# Patient Record
Sex: FEMALE | Race: WHITE | Employment: FULL TIME | URBAN - METROPOLITAN AREA
[De-identification: names, ages, dates, MRNs, and addresses within clinical notes are randomized per-mention and may not be internally consistent; named-entity substitution may affect disease eponyms.]

---

## 2020-09-10 ENCOUNTER — HOSPITAL ENCOUNTER (EMERGENCY)
Age: 50
Discharge: HOME | End: 2020-09-10
Attending: EMERGENCY MEDICINE

## 2020-09-10 VITALS
BODY MASS INDEX: 22.5 KG/M2 | WEIGHT: 140 LBS | RESPIRATION RATE: 16 BRPM | TEMPERATURE: 98.6 F | HEIGHT: 66 IN | DIASTOLIC BLOOD PRESSURE: 82 MMHG | SYSTOLIC BLOOD PRESSURE: 120 MMHG | OXYGEN SATURATION: 98 % | HEART RATE: 86 BPM

## 2020-09-10 DIAGNOSIS — F43.10 PTSD (POST-TRAUMATIC STRESS DISORDER): ICD-10-CM

## 2020-09-10 DIAGNOSIS — F33.1 MODERATE EPISODE OF RECURRENT MAJOR DEPRESSIVE DISORDER (HCC): Primary | ICD-10-CM

## 2020-09-10 LAB
ANION GAP SERPL CALC-SCNC: 5 MMOL/L (ref 7–16)
APAP SERPL-MCNC: <10 UG/ML (ref 10–30)
BASOPHILS # BLD: 0.1 K/UL (ref 0–0.2)
BASOPHILS NFR BLD: 1 % (ref 0–2)
BUN SERPL-MCNC: 13 MG/DL (ref 6–23)
CALCIUM SERPL-MCNC: 9.5 MG/DL (ref 8.3–10.4)
CHLORIDE SERPL-SCNC: 108 MMOL/L (ref 98–107)
CO2 SERPL-SCNC: 27 MMOL/L (ref 21–32)
CREAT SERPL-MCNC: 0.73 MG/DL (ref 0.6–1)
DIFFERENTIAL METHOD BLD: NORMAL
EOSINOPHIL # BLD: 0.5 K/UL (ref 0–0.8)
EOSINOPHIL NFR BLD: 6 % (ref 0.5–7.8)
ERYTHROCYTE [DISTWIDTH] IN BLOOD BY AUTOMATED COUNT: 13.7 % (ref 11.9–14.6)
ETHANOL SERPL-MCNC: <3 MG/DL
GLUCOSE SERPL-MCNC: 83 MG/DL (ref 65–100)
HCT VFR BLD AUTO: 41 % (ref 35.8–46.3)
HGB BLD-MCNC: 13.3 G/DL (ref 11.7–15.4)
IMM GRANULOCYTES # BLD AUTO: 0 K/UL (ref 0–0.5)
IMM GRANULOCYTES NFR BLD AUTO: 0 % (ref 0–5)
LYMPHOCYTES # BLD: 2.5 K/UL (ref 0.5–4.6)
LYMPHOCYTES NFR BLD: 33 % (ref 13–44)
MCH RBC QN AUTO: 29.2 PG (ref 26.1–32.9)
MCHC RBC AUTO-ENTMCNC: 32.4 G/DL (ref 31.4–35)
MCV RBC AUTO: 89.9 FL (ref 79.6–97.8)
MONOCYTES # BLD: 0.6 K/UL (ref 0.1–1.3)
MONOCYTES NFR BLD: 7 % (ref 4–12)
NEUTS SEG # BLD: 4 K/UL (ref 1.7–8.2)
NEUTS SEG NFR BLD: 52 % (ref 43–78)
NRBC # BLD: 0 K/UL (ref 0–0.2)
PLATELET # BLD AUTO: 385 K/UL (ref 150–450)
PMV BLD AUTO: 11 FL (ref 9.4–12.3)
POTASSIUM SERPL-SCNC: 3.6 MMOL/L (ref 3.5–5.1)
RBC # BLD AUTO: 4.56 M/UL (ref 4.05–5.2)
SALICYLATES SERPL-MCNC: 2.9 MG/DL (ref 2.8–20)
SODIUM SERPL-SCNC: 140 MMOL/L (ref 136–145)
WBC # BLD AUTO: 7.6 K/UL (ref 4.3–11.1)

## 2020-09-10 PROCEDURE — 80307 DRUG TEST PRSMV CHEM ANLYZR: CPT

## 2020-09-10 PROCEDURE — 85025 COMPLETE CBC W/AUTO DIFF WBC: CPT

## 2020-09-10 PROCEDURE — 80048 BASIC METABOLIC PNL TOTAL CA: CPT

## 2020-09-10 PROCEDURE — 99284 EMERGENCY DEPT VISIT MOD MDM: CPT

## 2020-09-10 RX ORDER — BUPROPION HYDROCHLORIDE 150 MG/1
150 TABLET ORAL
Qty: 30 TAB | Refills: 0 | Status: SHIPPED | OUTPATIENT
Start: 2020-09-10 | End: 2022-04-19

## 2020-09-10 RX ORDER — RISPERIDONE 2 MG/1
2 TABLET, FILM COATED ORAL
Qty: 30 TAB | Refills: 0 | Status: SHIPPED | OUTPATIENT
Start: 2020-09-10 | End: 2022-04-19
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
## 2020-09-11 NOTE — ED NOTES
I have reviewed discharge instructions with the patient. The patient verbalized understanding. Patient left ED via Discharge Method: ambulatory to Home with self. Opportunity for questions and clarification provided. Patient given 2 scripts. To continue your aftercare when you leave the hospital, you may receive an automated call from our care team to check in on how you are doing. This is a free service and part of our promise to provide the best care and service to meet your aftercare needs.  If you have questions, or wish to unsubscribe from this service please call [phone]. Thank you for Choosing our [facility].

## 2020-09-11 NOTE — DISCHARGE INSTRUCTIONS
Take medications as prescribed  Follow-up with mental health as soon as possible  [facility]  Return to ER for any worsening symptoms or new problems which may arise

## 2022-03-05 ENCOUNTER — HOSPITAL ENCOUNTER (EMERGENCY)
Age: 52
Discharge: HOME | End: 2022-03-05
Attending: EMERGENCY MEDICINE

## 2022-03-05 ENCOUNTER — APPOINTMENT (OUTPATIENT)
Dept: GENERAL RADIOLOGY | Age: 52
End: 2022-03-05
Attending: PHYSICIAN ASSISTANT

## 2022-03-05 VITALS
SYSTOLIC BLOOD PRESSURE: 126 MMHG | HEART RATE: 57 BPM | HEIGHT: 66 IN | WEIGHT: 145 LBS | OXYGEN SATURATION: 100 % | RESPIRATION RATE: 17 BRPM | TEMPERATURE: 98.5 F | BODY MASS INDEX: 23.3 KG/M2 | DIASTOLIC BLOOD PRESSURE: 79 MMHG

## 2022-03-05 DIAGNOSIS — S81.851A DOG BITE OF RIGHT LOWER LEG, INITIAL ENCOUNTER: Primary | ICD-10-CM
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
## 2022-03-05 LAB
BASOPHILS # BLD: 0 K/UL (ref 0–0.2)
BASOPHILS NFR BLD: 0 % (ref 0–2)
DIFFERENTIAL METHOD BLD: NORMAL
EOSINOPHIL # BLD: 0.1 K/UL (ref 0–0.8)
EOSINOPHIL NFR BLD: 1 % (ref 0.5–7.8)
ERYTHROCYTE [DISTWIDTH] IN BLOOD BY AUTOMATED COUNT: 13.9 % (ref 11.9–14.6)
HCT VFR BLD AUTO: 39.4 % (ref 35.8–46.3)
HGB BLD-MCNC: 12.6 G/DL (ref 11.7–15.4)
IMM GRANULOCYTES # BLD AUTO: 0 K/UL (ref 0–0.5)
IMM GRANULOCYTES NFR BLD AUTO: 0 % (ref 0–5)
LYMPHOCYTES # BLD: 3.1 K/UL (ref 0.5–4.6)
LYMPHOCYTES NFR BLD: 33 % (ref 13–44)
MCH RBC QN AUTO: 29.3 PG (ref 26.1–32.9)
MCHC RBC AUTO-ENTMCNC: 32 G/DL (ref 31.4–35)
MCV RBC AUTO: 91.6 FL (ref 79.6–97.8)
MONOCYTES # BLD: 0.6 K/UL (ref 0.1–1.3)
MONOCYTES NFR BLD: 7 % (ref 4–12)
NEUTS SEG # BLD: 5.7 K/UL (ref 1.7–8.2)
NEUTS SEG NFR BLD: 59 % (ref 43–78)
NRBC # BLD: 0 K/UL (ref 0–0.2)
PLATELET # BLD AUTO: 300 K/UL (ref 150–450)
PMV BLD AUTO: 11.2 FL (ref 9.4–12.3)
RBC # BLD AUTO: 4.3 M/UL (ref 4.05–5.2)
WBC # BLD AUTO: 9.6 K/UL (ref 4.3–11.1)

## 2022-03-05 PROCEDURE — 90715 TDAP VACCINE 7 YRS/> IM: CPT | Performed by: PHYSICIAN ASSISTANT

## 2022-03-05 PROCEDURE — 99284 EMERGENCY DEPT VISIT MOD MDM: CPT

## 2022-03-05 PROCEDURE — 96375 TX/PRO/DX INJ NEW DRUG ADDON: CPT

## 2022-03-05 PROCEDURE — 96365 THER/PROPH/DIAG IV INF INIT: CPT

## 2022-03-05 PROCEDURE — 74011250636 HC RX REV CODE- 250/636: Performed by: PHYSICIAN ASSISTANT

## 2022-03-05 PROCEDURE — 73590 X-RAY EXAM OF LOWER LEG: CPT

## 2022-03-05 PROCEDURE — 85025 COMPLETE CBC W/AUTO DIFF WBC: CPT

## 2022-03-05 PROCEDURE — 74011000258 HC RX REV CODE- 258: Performed by: PHYSICIAN ASSISTANT

## 2022-03-05 PROCEDURE — 90471 IMMUNIZATION ADMIN: CPT

## 2022-03-05 RX ORDER — TRAMADOL HYDROCHLORIDE 50 MG/1
50 TABLET ORAL
Qty: 12 TABLET | Refills: 0 | Status: SHIPPED | OUTPATIENT
Start: 2022-03-05 | End: 2022-03-08

## 2022-03-05 RX ORDER — AMOXICILLIN AND CLAVULANATE POTASSIUM 875; 125 MG/1; MG/1
1 TABLET, FILM COATED ORAL 2 TIMES DAILY
Qty: 20 TABLET | Refills: 0 | Status: SHIPPED | OUTPATIENT
Start: 2022-03-05 | End: 2022-03-15

## 2022-03-05 RX ORDER — IBUPROFEN 800 MG/1
800 TABLET ORAL
Qty: 20 TABLET | Refills: 0 | Status: SHIPPED | OUTPATIENT
Start: 2022-03-05 | End: 2022-03-12

## 2022-03-05 RX ORDER — MORPHINE SULFATE 4 MG/ML
2 INJECTION INTRAVENOUS
Status: COMPLETED | OUTPATIENT
Start: 2022-03-05 | End: 2022-03-05

## 2022-03-05 RX ADMIN — SODIUM CHLORIDE 3 G: 900 INJECTION INTRAVENOUS at 16:06

## 2022-03-05 RX ADMIN — MORPHINE SULFATE 2 MG: 4 INJECTION INTRAVENOUS at 16:15

## 2022-03-05 RX ADMIN — TETANUS TOXOID, REDUCED DIPHTHERIA TOXOID AND ACELLULAR PERTUSSIS VACCINE, ADSORBED 0.5 ML: 5; 2.5; 8; 8; 2.5 SUSPENSION INTRAMUSCULAR at 17:27

## 2022-03-05 NOTE — ED NOTES
I have reviewed discharge instructions with the patient. The patient verbalized understanding. Patient left ED via Discharge Method: ambulatory to Home with self    Opportunity for questions and clarification provided. Patient given 1 scripts. To continue your aftercare when you leave the hospital, you may receive an automated call from our care team to check in on how you are doing. This is a free service and part of our promise to provide the best care and service to meet your aftercare needs.  If you have questions, or wish to unsubscribe from this service please call [phone]. Thank you for Choosing our [facility].

## 2022-03-05 NOTE — DISCHARGE INSTRUCTIONS
Keep wound dry for 24-48 hours, then wash twice daily with soap and water. Watch for any signs of infection such as foul drainage from wound, spreading redness or fever as these would be reasons to return to the ER. Take full course of antibiotics (augmentin twice daily for 10 days), can take motrin every 6 hours for pain/swelling, tramadol every 6 hours as needed for pain. Keep right leg elevated as much as possible and use crutches to keep weight off of leg to alleviate stress on wounds. Follow up with Dr. Kendell Krishnan in the office this week for further evaluation of your wounds.
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
## 2022-03-05 NOTE — ED TRIAGE NOTES
Patient arrives via [facility] from home. Masked. Reports bit by neighbor's dog prior to arrival.  Reports tetanus is not up to date. Reports dog is vaccinated.

## 2022-03-28 ENCOUNTER — APPOINTMENT (OUTPATIENT)
Dept: GENERAL RADIOLOGY | Age: 52
DRG: 918 | End: 2022-03-28
Attending: EMERGENCY MEDICINE

## 2022-03-28 ENCOUNTER — HOSPITAL ENCOUNTER (INPATIENT)
Age: 52
LOS: 21 days | Discharge: PSYCHIATRIC OR SUBSTANCE USE FACILITY | DRG: 918 | End: 2022-04-19
Attending: EMERGENCY MEDICINE | Admitting: FAMILY MEDICINE

## 2022-03-28 DIAGNOSIS — F12.10 MARIJUANA ABUSE: ICD-10-CM

## 2022-03-28 DIAGNOSIS — F30.10 MANIC BEHAVIOR (HCC): ICD-10-CM

## 2022-03-28 DIAGNOSIS — Z65.8 PSYCHOSOCIAL STRESSORS: ICD-10-CM

## 2022-03-28 DIAGNOSIS — T14.91XA SUICIDE ATTEMPT (HCC): ICD-10-CM

## 2022-03-28 DIAGNOSIS — F31.9 BIPOLAR 1 DISORDER (HCC): ICD-10-CM

## 2022-03-28 DIAGNOSIS — T56.892A: Primary | ICD-10-CM

## 2022-03-28 DIAGNOSIS — F15.10 METHAMPHETAMINE ABUSE (HCC): ICD-10-CM

## 2022-03-28 DIAGNOSIS — R19.7 DIARRHEA, UNSPECIFIED TYPE: ICD-10-CM

## 2022-03-28 DIAGNOSIS — F22 PARANOIA (HCC): ICD-10-CM

## 2022-03-28 DIAGNOSIS — F22 DELUSIONS (HCC): ICD-10-CM

## 2022-03-28 DIAGNOSIS — F31.4 BIPOLAR 1 DISORDER, DEPRESSED, SEVERE (HCC): ICD-10-CM

## 2022-03-28 LAB
ALBUMIN SERPL-MCNC: 4 G/DL (ref 3.5–5)
ALBUMIN/GLOB SERPL: 1.4 {RATIO} (ref 1.2–3.5)
ALP SERPL-CCNC: 80 U/L (ref 50–136)
ALT SERPL-CCNC: 16 U/L (ref 12–65)
AMPHET UR QL SCN: POSITIVE
ANION GAP SERPL CALC-SCNC: 9 MMOL/L (ref 7–16)
APAP SERPL-MCNC: <2 UG/ML (ref 10–30)
AST SERPL-CCNC: 9 U/L (ref 15–37)
ATRIAL RATE: 108 BPM
BARBITURATES UR QL SCN: NEGATIVE
BASOPHILS # BLD: 0 K/UL (ref 0–0.2)
BASOPHILS NFR BLD: 0 % (ref 0–2)
BENZODIAZ UR QL: NEGATIVE
BILIRUB SERPL-MCNC: 0.6 MG/DL (ref 0.2–1.1)
BUN SERPL-MCNC: 13 MG/DL (ref 6–23)
CALCIUM SERPL-MCNC: 9.7 MG/DL (ref 8.3–10.4)
CALCULATED P AXIS, ECG09: 91 DEGREES
CALCULATED R AXIS, ECG10: 85 DEGREES
CALCULATED T AXIS, ECG11: 64 DEGREES
CANNABINOIDS UR QL SCN: POSITIVE
CHLORIDE SERPL-SCNC: 108 MMOL/L (ref 98–107)
CO2 SERPL-SCNC: 24 MMOL/L (ref 21–32)
COCAINE UR QL SCN: NEGATIVE
CREAT SERPL-MCNC: 0.7 MG/DL (ref 0.6–1)
DIAGNOSIS, 93000: NORMAL
DIFFERENTIAL METHOD BLD: NORMAL
EOSINOPHIL # BLD: 0.1 K/UL (ref 0–0.8)
EOSINOPHIL NFR BLD: 1 % (ref 0.5–7.8)
ERYTHROCYTE [DISTWIDTH] IN BLOOD BY AUTOMATED COUNT: 13.4 % (ref 11.9–14.6)
ETHANOL SERPL-MCNC: <3 MG/DL
GLOBULIN SER CALC-MCNC: 2.8 G/DL (ref 2.3–3.5)
GLUCOSE SERPL-MCNC: 116 MG/DL (ref 65–100)
HCT VFR BLD AUTO: 40.6 % (ref 35.8–46.3)
HGB BLD-MCNC: 13.4 G/DL (ref 11.7–15.4)
IMM GRANULOCYTES # BLD AUTO: 0 K/UL (ref 0–0.5)
IMM GRANULOCYTES NFR BLD AUTO: 0 % (ref 0–5)
LITHIUM SERPL-SCNC: 0.7 MMOL/L (ref 0.6–1.2)
LYMPHOCYTES # BLD: 2.1 K/UL (ref 0.5–4.6)
LYMPHOCYTES NFR BLD: 23 % (ref 13–44)
MAGNESIUM SERPL-MCNC: 1.9 MG/DL (ref 1.8–2.4)
MCH RBC QN AUTO: 29.5 PG (ref 26.1–32.9)
MCHC RBC AUTO-ENTMCNC: 33 G/DL (ref 31.4–35)
MCV RBC AUTO: 89.2 FL (ref 79.6–97.8)
METHADONE UR QL: NEGATIVE
MONOCYTES # BLD: 0.5 K/UL (ref 0.1–1.3)
MONOCYTES NFR BLD: 6 % (ref 4–12)
NEUTS SEG # BLD: 6.3 K/UL (ref 1.7–8.2)
NEUTS SEG NFR BLD: 70 % (ref 43–78)
NRBC # BLD: 0 K/UL (ref 0–0.2)
OPIATES UR QL: NEGATIVE
P-R INTERVAL, ECG05: 121 MS
PCP UR QL: NEGATIVE
PLATELET # BLD AUTO: 355 K/UL (ref 150–450)
PMV BLD AUTO: 11 FL (ref 9.4–12.3)
POTASSIUM SERPL-SCNC: 4.1 MMOL/L (ref 3.5–5.1)
PROT SERPL-MCNC: 6.8 G/DL (ref 6.3–8.2)
Q-T INTERVAL, ECG07: 359 MS
QRS DURATION, ECG06: 108 MS
QTC CALCULATION (BEZET), ECG08: 484 MS
RBC # BLD AUTO: 4.55 M/UL (ref 4.05–5.2)
SALICYLATES SERPL-MCNC: 4.6 MG/DL (ref 2.8–20)
SODIUM SERPL-SCNC: 141 MMOL/L (ref 136–145)
VENTRICULAR RATE, ECG03: 109 BPM
WBC # BLD AUTO: 9.1 K/UL (ref 4.3–11.1)

## 2022-03-28 PROCEDURE — 82077 ASSAY SPEC XCP UR&BREATH IA: CPT

## 2022-03-28 PROCEDURE — 81003 URINALYSIS AUTO W/O SCOPE: CPT

## 2022-03-28 PROCEDURE — 80143 DRUG ASSAY ACETAMINOPHEN: CPT

## 2022-03-28 PROCEDURE — 93005 ELECTROCARDIOGRAM TRACING: CPT | Performed by: EMERGENCY MEDICINE

## 2022-03-28 PROCEDURE — 85025 COMPLETE CBC W/AUTO DIFF WBC: CPT

## 2022-03-28 PROCEDURE — 80053 COMPREHEN METABOLIC PANEL: CPT

## 2022-03-28 PROCEDURE — 99285 EMERGENCY DEPT VISIT HI MDM: CPT

## 2022-03-28 PROCEDURE — 80179 DRUG ASSAY SALICYLATE: CPT

## 2022-03-28 PROCEDURE — 74011250636 HC RX REV CODE- 250/636: Performed by: EMERGENCY MEDICINE

## 2022-03-28 PROCEDURE — 80178 ASSAY OF LITHIUM: CPT

## 2022-03-28 PROCEDURE — 84443 ASSAY THYROID STIM HORMONE: CPT

## 2022-03-28 PROCEDURE — 80307 DRUG TEST PRSMV CHEM ANLYZR: CPT

## 2022-03-28 PROCEDURE — 74018 RADEX ABDOMEN 1 VIEW: CPT

## 2022-03-28 PROCEDURE — 83735 ASSAY OF MAGNESIUM: CPT

## 2022-03-28 RX ORDER — SODIUM CHLORIDE 9 MG/ML
1000 INJECTION, SOLUTION INTRAVENOUS ONCE
Status: COMPLETED | OUTPATIENT
Start: 2022-03-28 | End: 2022-03-28

## 2022-03-28 RX ADMIN — SODIUM CHLORIDE 1000 ML: 9 INJECTION, SOLUTION INTRAVENOUS at 19:15

## 2022-03-28 NOTE — ED PROVIDER NOTES
And was brought to the emergency room by EMS with a reported intentional overdose of lithium. The patient has a bottle of what is listed is lithium  mg tablets but the bottle is fairly full. She does not remember how many pills she took or cannot say when she took them. She denies any coingestants. Patient presents in a state of somnolence which EMS is been present since they picked her up. Patient is a poor historian, with a history of substance abuse and bipolar disorder. The history is provided by the patient and medical records. Drug Overdose  This is a new problem. Episode onset: Unknown duration. The problem has not changed since onset. Pertinent negatives include no chest pain, no abdominal pain, no headaches and no shortness of breath. Nothing aggravates the symptoms. Nothing relieves the symptoms. She has tried nothing for the symptoms. The treatment provided no relief. Mental Health Problem   This is a recurrent problem. The problem has not changed since onset. Associated symptoms include somnolence and self-injury. Mental status baseline is normal.  Risk factors include illicit drug use and the patient not taking meds correctly. Her past medical history is significant for psychotropic medication treatment.         Past Medical History:   Diagnosis Date    Ankle fracture, left     Psychiatric disorder     depression and axiety, no treatment at this time       Past Surgical History:   Procedure Laterality Date    HX GYN  1996    rectal reconstruction after child iris    HX OTHER SURGICAL      HX TUBAL LIGATION  1996         Family History:   Problem Relation Age of Onset    Cancer Mother     Cancer Father     Lung Disease Father        Social History     Socioeconomic History    Marital status:      Spouse name: Not on file    Number of children: Not on file    Years of education: Not on file    Highest education level: Not on file   Occupational History    Not on file Tobacco Use    Smoking status: Current Every Day Smoker     Packs/day: 2.00     Years: 26.00     Pack years: 52.00    Smokeless tobacco: Never Used   Substance and Sexual Activity    Alcohol use: No    Drug use: Yes     Types: Marijuana, Methamphetamines, Prescription    Sexual activity: Not on file     Comment: no meth in 8 yr and no pot in 5 yrs   Other Topics Concern    Not on file   Social History Narrative    Not on file     Social Determinants of Health     Financial Resource Strain:     Difficulty of Paying Living Expenses: Not on file   Food Insecurity:     Worried About Running Out of Food in the Last Year: Not on file    Kelvin of Food in the Last Year: Not on file   Transportation Needs:     Lack of Transportation (Medical): Not on file    Lack of Transportation (Non-Medical): Not on file   Physical Activity:     Days of Exercise per Week: Not on file    Minutes of Exercise per Session: Not on file   Stress:     Feeling of Stress : Not on file   Social Connections:     Frequency of Communication with Friends and Family: Not on file    Frequency of Social Gatherings with Friends and Family: Not on file    Attends Mandaeism Services: Not on file    Active Member of [address] [place] [address] [employer] or Organizations: Not on file    Attends Club or Organization Meetings: Not on file    Marital Status: Not on file   Intimate Partner Violence:     Fear of Current or Ex-Partner: Not on file    Emotionally Abused: Not on file    Physically Abused: Not on file    Sexually Abused: Not on file   Housing Stability:     Unable to Pay for Housing in the Last Year: Not on file    Number of [place] in the Last Year: Not on file    Unstable Housing in the Last Year: Not on file         ALLERGIES: Bactrim [sulfamethoprim ds]    Review of Systems   Constitutional: Negative for chills and fever. Respiratory: Negative for shortness of breath. Cardiovascular: Negative for chest pain.    Gastrointestinal: Negative for abdominal pain. Neurological: Negative for headaches. Psychiatric/Behavioral: Positive for self-injury. All other systems reviewed and are negative. Vitals:    03/28/22 1851 03/28/22 1855   BP: 94/78    Pulse: (!) 113    Resp: 16    Temp:  98.5 °F (36.9 °C)   SpO2: 99%    Weight: 65.8 kg (145 lb)    Height: 5' 6\" (1.676 m)             Physical Exam  Vitals and nursing note reviewed. Constitutional:       General: She is not in acute distress. Appearance: Normal appearance. She is not ill-appearing, toxic-appearing or diaphoretic. HENT:      Head: Normocephalic and atraumatic. Mouth/Throat:      Mouth: Mucous membranes are moist.      Pharynx: Oropharynx is clear. No oropharyngeal exudate or posterior oropharyngeal erythema. Eyes:      Extraocular Movements: Extraocular movements intact. Conjunctiva/sclera: Conjunctivae normal.      Pupils: Pupils are equal, round, and reactive to light. Cardiovascular:      Rate and Rhythm: Normal rate and regular rhythm. Pulses: Normal pulses. Heart sounds: Normal heart sounds. Pulmonary:      Effort: Pulmonary effort is normal.      Breath sounds: Normal breath sounds. Abdominal:      General: There is no distension. Palpations: Abdomen is soft. Tenderness: There is no abdominal tenderness. There is no right CVA tenderness, left CVA tenderness, guarding or rebound. Musculoskeletal:         General: Normal range of motion. Cervical back: Normal range of motion and neck supple. Skin:     General: Skin is warm and dry. Capillary Refill: Capillary refill takes less than 2 seconds. Neurological:      General: No focal deficit present. Mental Status: She is alert and oriented to person, place, and time. Mental status is at baseline. Psychiatric:         Mood and Affect: Mood normal.         Behavior: Behavior normal.         Thought Content:  Thought content normal.          MDM  Number of Diagnoses or Management Options     Amount and/or Complexity of Data Reviewed  Clinical lab tests: ordered and reviewed  Tests in the radiology section of CPT®: ordered and reviewed  Review and summarize past medical records: yes  Discuss the patient with other providers: yes  Independent visualization of images, tracings, or specimens: yes (EKG at 1857: Sinus tachycardia, rate of 109. Possible right atrial enlargement. No acute ischemic changes, no ectopy, normal intervals.   Normal QRS duration.)    Risk of Complications, Morbidity, and/or Mortality  Presenting problems: moderate  Diagnostic procedures: moderate  Management options: moderate    Patient Progress  Patient progress: stable         Procedures

## 2022-03-28 NOTE — ED TRIAGE NOTES
Pt arrives via EMS from home with CO taking an unknown amount of liithium carbonate as well as something else that she doesn't remember. Pt reports she took these medications around a half hour before calling EMS. VSS en route. Pt a/o x4. Admits to this being an attempt to commit suicide.

## 2022-03-29 PROBLEM — F15.11 HISTORY OF METHAMPHETAMINE ABUSE (HCC): Status: ACTIVE | Noted: 2018-01-26

## 2022-03-29 PROBLEM — T56.892A INTENTIONAL LITHIUM OVERDOSE (HCC): Status: ACTIVE | Noted: 2022-03-29

## 2022-03-29 PROBLEM — F31.4 BIPOLAR 1 DISORDER, DEPRESSED, SEVERE (HCC): Status: ACTIVE | Noted: 2020-11-27

## 2022-03-29 LAB
ANION GAP SERPL CALC-SCNC: 6 MMOL/L (ref 7–16)
BASOPHILS # BLD: 0 K/UL (ref 0–0.2)
BASOPHILS NFR BLD: 0 % (ref 0–2)
BILIRUB UR QL: ABNORMAL
BUN SERPL-MCNC: 9 MG/DL (ref 6–23)
CALCIUM SERPL-MCNC: 9 MG/DL (ref 8.3–10.4)
CHLORIDE SERPL-SCNC: 113 MMOL/L (ref 98–107)
CO2 SERPL-SCNC: 23 MMOL/L (ref 21–32)
CREAT SERPL-MCNC: 0.6 MG/DL (ref 0.6–1)
DIFFERENTIAL METHOD BLD: NORMAL
EOSINOPHIL # BLD: 0.1 K/UL (ref 0–0.8)
EOSINOPHIL NFR BLD: 1 % (ref 0.5–7.8)
ERYTHROCYTE [DISTWIDTH] IN BLOOD BY AUTOMATED COUNT: 13.2 % (ref 11.9–14.6)
GLUCOSE SERPL-MCNC: 89 MG/DL (ref 65–100)
GLUCOSE UR QL STRIP.AUTO: NEGATIVE MG/DL
HCT VFR BLD AUTO: 37.3 % (ref 35.8–46.3)
HGB BLD-MCNC: 12.2 G/DL (ref 11.7–15.4)
IMM GRANULOCYTES # BLD AUTO: 0 K/UL (ref 0–0.5)
IMM GRANULOCYTES NFR BLD AUTO: 0 % (ref 0–5)
KETONES UR-MCNC: 15 MG/DL
LEUKOCYTE ESTERASE UR QL STRIP: NEGATIVE
LITHIUM SERPL-SCNC: 1.4 MMOL/L (ref 0.6–1.2)
LITHIUM SERPL-SCNC: 1.7 MMOL/L (ref 0.6–1.2)
LITHIUM SERPL-SCNC: 2 MMOL/L (ref 0.6–1.2)
LITHIUM SERPL-SCNC: 2.1 MMOL/L (ref 0.6–1.2)
LYMPHOCYTES # BLD: 2.2 K/UL (ref 0.5–4.6)
LYMPHOCYTES NFR BLD: 28 % (ref 13–44)
MCH RBC QN AUTO: 29.5 PG (ref 26.1–32.9)
MCHC RBC AUTO-ENTMCNC: 32.7 G/DL (ref 31.4–35)
MCV RBC AUTO: 90.1 FL (ref 79.6–97.8)
MONOCYTES # BLD: 0.6 K/UL (ref 0.1–1.3)
MONOCYTES NFR BLD: 8 % (ref 4–12)
NEUTS SEG # BLD: 4.9 K/UL (ref 1.7–8.2)
NEUTS SEG NFR BLD: 62 % (ref 43–78)
NITRITE UR QL: NEGATIVE
NRBC # BLD: 0 K/UL (ref 0–0.2)
PH UR: 5.5 [PH] (ref 5–9)
PLATELET # BLD AUTO: 351 K/UL (ref 150–450)
PMV BLD AUTO: 11.1 FL (ref 9.4–12.3)
POTASSIUM SERPL-SCNC: 3.7 MMOL/L (ref 3.5–5.1)
PROT UR QL: 30 MG/DL
RBC # BLD AUTO: 4.14 M/UL (ref 4.05–5.2)
RBC # UR STRIP: ABNORMAL /UL
SODIUM SERPL-SCNC: 142 MMOL/L (ref 136–145)
SP GR UR: >1.03 (ref 1–1.02)
TSH SERPL DL<=0.005 MIU/L-ACNC: 0.65 UIU/ML (ref 0.36–3.74)
UROBILINOGEN UR QL: 0.2 EU/DL (ref 0.2–1)
WBC # BLD AUTO: 7.8 K/UL (ref 4.3–11.1)

## 2022-03-29 PROCEDURE — 80178 ASSAY OF LITHIUM: CPT

## 2022-03-29 PROCEDURE — 65660000000 HC RM CCU STEPDOWN

## 2022-03-29 PROCEDURE — 36415 COLL VENOUS BLD VENIPUNCTURE: CPT

## 2022-03-29 PROCEDURE — 74011000250 HC RX REV CODE- 250: Performed by: FAMILY MEDICINE

## 2022-03-29 PROCEDURE — 74011250636 HC RX REV CODE- 250/636: Performed by: EMERGENCY MEDICINE

## 2022-03-29 PROCEDURE — 85025 COMPLETE CBC W/AUTO DIFF WBC: CPT

## 2022-03-29 PROCEDURE — 80048 BASIC METABOLIC PNL TOTAL CA: CPT

## 2022-03-29 PROCEDURE — 74011250636 HC RX REV CODE- 250/636: Performed by: FAMILY MEDICINE

## 2022-03-29 RX ORDER — ONDANSETRON 4 MG/1
4 TABLET, ORALLY DISINTEGRATING ORAL
Status: DISCONTINUED | OUTPATIENT
Start: 2022-03-29 | End: 2022-04-16 | Stop reason: SDUPTHER

## 2022-03-29 RX ORDER — SODIUM CHLORIDE 9 MG/ML
150 INJECTION, SOLUTION INTRAVENOUS CONTINUOUS
Status: DISCONTINUED | OUTPATIENT
Start: 2022-03-29 | End: 2022-03-29

## 2022-03-29 RX ORDER — SODIUM CHLORIDE 0.9 % (FLUSH) 0.9 %
5-40 SYRINGE (ML) INJECTION AS NEEDED
Status: DISCONTINUED | OUTPATIENT
Start: 2022-03-29 | End: 2022-04-19 | Stop reason: HOSPADM

## 2022-03-29 RX ORDER — TRAZODONE HYDROCHLORIDE 50 MG/1
50 TABLET ORAL
COMMUNITY
Start: 2021-08-16

## 2022-03-29 RX ORDER — SODIUM CHLORIDE 0.9 % (FLUSH) 0.9 %
5-40 SYRINGE (ML) INJECTION EVERY 8 HOURS
Status: DISCONTINUED | OUTPATIENT
Start: 2022-03-29 | End: 2022-04-19 | Stop reason: HOSPADM

## 2022-03-29 RX ORDER — ENOXAPARIN SODIUM 100 MG/ML
40 INJECTION SUBCUTANEOUS DAILY
Status: DISCONTINUED | OUTPATIENT
Start: 2022-03-29 | End: 2022-04-19 | Stop reason: HOSPADM

## 2022-03-29 RX ORDER — PRAZOSIN HYDROCHLORIDE 1 MG/1
1 CAPSULE ORAL
COMMUNITY
Start: 2021-08-16 | End: 2022-04-19

## 2022-03-29 RX ORDER — LITHIUM CARBONATE 300 MG/1
1200 TABLET, FILM COATED, EXTENDED RELEASE ORAL
COMMUNITY
Start: 2021-08-16 | End: 2022-04-19

## 2022-03-29 RX ORDER — ONDANSETRON 2 MG/ML
4 INJECTION INTRAMUSCULAR; INTRAVENOUS
Status: DISCONTINUED | OUTPATIENT
Start: 2022-03-29 | End: 2022-04-19 | Stop reason: HOSPADM

## 2022-03-29 RX ORDER — GABAPENTIN 100 MG/1
200 CAPSULE ORAL 3 TIMES DAILY
COMMUNITY
Start: 2021-08-16 | End: 2022-04-19

## 2022-03-29 RX ORDER — SODIUM CHLORIDE 9 MG/ML
200 INJECTION, SOLUTION INTRAVENOUS CONTINUOUS
Status: DISCONTINUED | OUTPATIENT
Start: 2022-03-29 | End: 2022-03-30

## 2022-03-29 RX ORDER — DIPHENHYDRAMINE HCL 25 MG
50 TABLET ORAL
COMMUNITY
Start: 2021-08-16 | End: 2022-04-19

## 2022-03-29 RX ORDER — HYDROXYZINE 25 MG/1
25 TABLET, FILM COATED ORAL
COMMUNITY
Start: 2021-08-16 | End: 2022-04-19

## 2022-03-29 RX ADMIN — SODIUM CHLORIDE 200 ML/HR: 900 INJECTION, SOLUTION INTRAVENOUS at 05:46

## 2022-03-29 RX ADMIN — SODIUM CHLORIDE 150 ML/HR: 9 INJECTION, SOLUTION INTRAVENOUS at 02:09

## 2022-03-29 RX ADMIN — SODIUM CHLORIDE, PRESERVATIVE FREE 10 ML: 5 INJECTION INTRAVENOUS at 05:38

## 2022-03-29 RX ADMIN — SODIUM CHLORIDE 200 ML/HR: 900 INJECTION, SOLUTION INTRAVENOUS at 21:37

## 2022-03-29 RX ADMIN — ENOXAPARIN SODIUM 40 MG: 100 INJECTION SUBCUTANEOUS at 08:37

## 2022-03-29 NOTE — ROUTINE PROCESS
Bedside and Verbal report given to self by Art Pate RN. Report included SBAR, Kardex, ED Summary, Procedure Summary, Intake and Output and Cardiac Rhythm.

## 2022-03-29 NOTE — ED NOTES
Pt resting in stretcher w/ eyes closed and verbalizes no complaints. resps even and unlabored. Pt remains in line of sight of nursing station and charge RN.

## 2022-03-29 NOTE — ASSESSMENT & PLAN NOTE
- Reportedly suicidal  - Psychiatry consultation pending  - Patient is on commitment papers  - Will need sitter  - Currently somnolent but rousable  - Lithium level elevated at 1.4, but not extremely yet. Will continue to trend.   - Monitor renal function and respiratory status  - Admit with remote tele orders

## 2022-03-29 NOTE — PROGRESS NOTES
TRANSFER - IN REPORT:    Verbal report received from Rosita Graves on Edie Loser being received from ER  for routine progression of care. Report consisted of patients Situation, Background, Assessment and Recommendations(SBAR). Information from the following report(s) SBAR, Kardex, ED Summary, [facility] and Recent Results was reviewed. Opportunity for questions and clarification was provided. Assessment completed upon patients arrival to unit and care assumed. Patient received to room 318. Patient connected to monitor and assessment completed. Plan of care reviewed. Patient oriented to room and call light. Patient aware to use call light to communicate any chest pain or needs. Admission skin assessment completed with second RN and reveals the following: abrasion to right lower leg from dog bite per patient. Tattoo to lower back. Heels and sacrum intact, no redness present. All sharp, plastic items, and cords removed, sitter at bedside, and belongings locked up with security.

## 2022-03-29 NOTE — ROUTINE PROCESS
Bedside and Verbal shift change report given to myself (oncoming nurse) by Saleem Huerta RN (offgoing nurse). Report included the following information SBAR, Kardex, MAR and Recent Results.

## 2022-03-29 NOTE — ASSESSMENT & PLAN NOTE
- Holding home meds for time being given intention overdose of lithium with elevated lithium level  - Psychiatry consulted due to suicide attempt

## 2022-03-29 NOTE — ED NOTES
Pt resting in bed w/ eyes closed. resps even and unlabored. Pt arouses to loud verbal stimuli. Pt verbalizes no complaints. Pt in line of sight of nursing station and charge RN desk.

## 2022-03-29 NOTE — PROGRESS NOTES
Pt presented to the ED via EMS (pt called) with reported intentional overdose of Lithium in an attempt to commit suicide. UDS was positive for amphetamines and THC. Has a PMHx of BP and substance abuse. She had her Lithium bottle with her; out of 120 pills, she ingested 15 pills. CM attempted to see the pt but was arousable. Sitter present. Psych consulted. Pt is self pay; MODESTO attempted to see pt for F. Aid determination. Will revisit.     Care Management Interventions  PCP Verified by CM: No  Mode of Transport at Discharge: S  Transition of Care Consult (CM Consult): Discharge Planning  Discharge Durable Medical Equipment: No  Physical Therapy Consult: No  Occupational Therapy Consult: No  Speech Therapy Consult: No  Support Systems: Friend/Neighbor  Confirm Follow Up Transport: Self  The Plan for Transition of Care is Related to the Following Treatment Goals : Return home and back to her baseline  Discharge Location  Patient Expects to be Discharged to[de-identified] Unable to determine at this time

## 2022-03-29 NOTE — ROUTINE PROCESS
Bedside and Verbal report given to Betty Barfield RN by self. Report included SBAR, Kardex, ED summary, procedure summary, recent results and cardiac rhythm.

## 2022-03-29 NOTE — PROGRESS NOTES
Hospitalist Progress Note   Admit Date:  3/28/2022  6:49 PM   Name:  Aidan Leal   Age:  46 y.o. Sex:  female  :  1970   MRN:  [id number]   Room:  [facility]/    Presenting Complaint: Drug Overdose and Mental Health Problem    Reason(s) for Admission: Intentional lithium overdose [facility] Course & Interval History:     Aidan Leal is a 46 y.o. female with medical history of bipolar disorder admitted with reported intentional overdose of lithium. Patient apparently took an unknown amount of lithium and \"something else\" in an attempt to commit suicide. She has her Lithium bottle with her. The quality dispensed was 120, and there are currently 105 pills counted by ER nurse. UDS is positive for amphetamines and THC. Started on Fluid resuscitation    Subjective/24hr Events (22): Patient is seen at the bedside. Somnolent but arousable. Answering appropriately. Denies suicidal ideation at this time. Denies chest pain, palpitation, nausea, vomiting, shortness of breath. Denies headache or blurred vision. Lithium level trending up, 2.10 this AM.  Nephrology consulted. ROS:  10 systems reviewed and negative except as noted above. Assessment & Plan:     Intentional lithium overdose:  Reportedly suicidal  Psychiatry consultation pending  Patient is on commitment paper, need sitter  Currently somnolent but arousable  Lithium level trending up, 2.1 this morning. Will continue to trend  Nephrology consult  Continue fluid resuscitation  Monitor renal function and respiratory status    Substance abuse:  UDS positive for amphetamine and THC  Counseled    Bipolar disorder:  Holding home meds for time being given intention overdose of lithium with elevated lithium level  Psychiatry consulted due to suicide attempt      Discharge Planning:   To be determined    Diet:  ADULT DIET Regular  DVT PPx: SCD  Code status: Full Code    Hospital Problems as of 3/29/2022 Never Reviewed          Codes Class Noted - Resolved POA    * (Principal) Intentional lithium overdose ([facility] ICD-10-CM: J90.079N  ICD-9-CM: 985.8, E950.9  3/29/2022 - Present Unknown        Bipolar 1 disorder, depressed, severe ([facility] ICD-10-CM: F31.4  ICD-9-CM: 296.53  11/27/2020 - Present Yes        History of methamphetamine abuse ([facility] ICD-10-CM: F15.11  ICD-9-CM: 305.73  1/26/2018 - Present Yes              Objective:     Patient Vitals for the past 24 hrs:   Temp Pulse Resp BP SpO2   03/29/22 1215 97.8 °F (36.6 °C) 88 19 115/60 99 %   03/29/22 0711 98.2 °F (36.8 °C) 81 16 108/65 96 %   03/29/22 0429 98.4 °F (36.9 °C) 91 18 (!) 110/54 97 %   03/29/22 0353 98.8 °F (37.1 °C) -- -- -- --   03/29/22 0329 -- 93 13 (!) 95/59 95 %   03/29/22 0259 -- 78 14 112/76 97 %   03/29/22 0229 -- 85 15 116/74 95 %   03/29/22 0215 -- (!) 101 14 -- 97 %   03/29/22 0200 -- -- -- (!) 95/56 92 %   03/29/22 0130 -- -- -- 119/65 97 %   03/29/22 0100 -- -- -- 113/75 98 %   03/29/22 0000 -- -- -- 121/74 97 %   03/28/22 2330 -- -- -- 118/72 98 %   03/28/22 2300 -- 91 -- 119/72 99 %   03/28/22 2230 -- 93 -- 114/78 97 %   03/28/22 2200 -- 98 -- 103/61 95 %   03/28/22 2133 -- (!) 107 13 116/71 97 %   03/28/22 2103 -- 98 14 109/68 98 %   03/28/22 2050 -- 98 14 -- 97 %   03/28/22 1950 -- (!) 101 11 (!) 96/54 100 %   03/28/22 1935 -- (!) 104 14 (!) 102/56 --   03/28/22 1907 -- (!) 114 15 -- 99 %   03/28/22 1855 98.5 °F (36.9 °C) -- -- -- --   03/28/22 1851 -- (!) 113 16 94/78 99 %     Oxygen Therapy  O2 Sat (%): 99 % (03/29/22 1215)  Pulse via Oximetry: 93 beats per minute (03/29/22 0329)  O2 Device: None (Room air) (03/29/22 1253)    Estimated body mass index is 21.24 kg/m² as calculated from the following:    Height as of this encounter: 5' 6\" (1.676 m). Weight as of this encounter: 59.7 kg (131 lb 9.6 oz).     Intake/Output Summary (Last 24 hours) at 3/29/2022 1520  Last data filed at 3/29/2022 1220  Gross per 24 hour   Intake 1150 ml   Output -- Net 1150 ml         Physical Exam:     Blood pressure 115/60, pulse 88, temperature 97.8 °F (36.6 °C), resp. rate 19, height 5' 6\" (1.676 m), weight 59.7 kg (131 lb 9.6 oz), SpO2 99 %. General:    Somnolent but arousable  Head:  Normocephalic, atraumatic  Eyes:  Sclerae appear normal.  Pupils equally round. ENT:  Nares appear normal, no drainage. Moist oral mucosa  Neck:  No restricted ROM. Trachea midline   CV:   RRR. No m/r/g. No jugular venous distension. Lungs:   CTAB. No wheezing, rhonchi, or rales. Respirations even, unlabored  Abdomen: Bowel sounds present. Soft, nontender, nondistended. Extremities: No cyanosis or clubbing. No edema  Skin:     No rashes and normal coloration. Warm and dry. Neuro:  CN II-XII grossly intact. Sensation intact. A&Ox3  Psych:  Somnolent but arousable    I have reviewed ordered lab tests and independently visualized imaging below:    Recent Labs:  Recent Results (from the past 48 hour(s))   CBC WITH AUTOMATED DIFF    Collection Time: 03/28/22  6:57 PM   Result Value Ref Range    WBC 9.1 4.3 - 11.1 K/uL    RBC 4.55 4.05 - 5.2 M/uL    HGB 13.4 11.7 - 15.4 g/dL    HCT 40.6 35.8 - 46.3 %    MCV 89.2 79.6 - 97.8 FL    MCH 29.5 26.1 - 32.9 PG    MCHC 33.0 31.4 - 35.0 g/dL    RDW 13.4 11.9 - 14.6 %    PLATELET 942 610 - 445 K/uL    MPV 11.0 9.4 - 12.3 FL    ABSOLUTE NRBC 0.00 0.0 - 0.2 K/uL    DF AUTOMATED      NEUTROPHILS 70 43 - 78 %    LYMPHOCYTES 23 13 - 44 %    MONOCYTES 6 4.0 - 12.0 %    EOSINOPHILS 1 0.5 - 7.8 %    BASOPHILS 0 0.0 - 2.0 %    IMMATURE GRANULOCYTES 0 0.0 - 5.0 %    ABS. NEUTROPHILS 6.3 1.7 - 8.2 K/UL    ABS. LYMPHOCYTES 2.1 0.5 - 4.6 K/UL    ABS. MONOCYTES 0.5 0.1 - 1.3 K/UL    ABS. EOSINOPHILS 0.1 0.0 - 0.8 K/UL    ABS. BASOPHILS 0.0 0.0 - 0.2 K/UL    ABS. IMM.  GRANS. 0.0 0.0 - 0.5 K/UL   METABOLIC PANEL, COMPREHENSIVE    Collection Time: 03/28/22  6:57 PM   Result Value Ref Range    Sodium 141 136 - 145 mmol/L    Potassium 4.1 3.5 - 5.1 mmol/L    Chloride 108 (H) 98 - 107 mmol/L    CO2 24 21 - 32 mmol/L    Anion gap 9 7 - 16 mmol/L    Glucose 116 (H) 65 - 100 mg/dL    BUN 13 6 - 23 MG/DL    Creatinine 0.70 0.6 - 1.0 MG/DL    GFR est AA >60 >60 ml/min/1.73m2    GFR est non-AA >60 >60 ml/min/1.73m2    Calcium 9.7 8.3 - 10.4 MG/DL    Bilirubin, total 0.6 0.2 - 1.1 MG/DL    ALT (SGPT) 16 12 - 65 U/L    AST (SGOT) 9 (L) 15 - 37 U/L    Alk.  phosphatase 80 50 - 136 U/L    Protein, total 6.8 6.3 - 8.2 g/dL    Albumin 4.0 3.5 - 5.0 g/dL    Globulin 2.8 2.3 - 3.5 g/dL    A-G Ratio 1.4 1.2 - 3.5     ACETAMINOPHEN    Collection Time: 03/28/22  6:57 PM   Result Value Ref Range    Acetaminophen level <2 (L) 10.0 - 30.0 ug/mL   ETHYL ALCOHOL    Collection Time: 03/28/22  6:57 PM   Result Value Ref Range    ALCOHOL(ETHYL),SERUM <3 MG/DL   MAGNESIUM    Collection Time: 03/28/22  6:57 PM   Result Value Ref Range    Magnesium 1.9 1.8 - 2.4 mg/dL   LITHIUM    Collection Time: 03/28/22  6:57 PM   Result Value Ref Range    Lithium level 0.70 0.60 - 1.20 MMOL/L   TSH 3RD GENERATION    Collection Time: 03/28/22  6:57 PM   Result Value Ref Range    TSH 0.650 0.358 - 2.965 uIU/mL   SALICYLATE    Collection Time: 03/28/22  6:57 PM   Result Value Ref Range    Salicylate level 4.6 2.8 - 20.0 MG/DL   EKG, 12 LEAD, INITIAL    Collection Time: 03/28/22  6:57 PM   Result Value Ref Range    Ventricular Rate 109 BPM    Atrial Rate 108 BPM    P-R Interval 121 ms    QRS Duration 108 ms    Q-T Interval 359 ms    QTC Calculation (Bezet) 484 ms    Calculated P Axis 91 degrees    Calculated R Axis 85 degrees    Calculated T Axis 64 degrees    Diagnosis       Sinus tachycardia  Right atrial enlargement  Baseline wander in lead(s) V3    Confirmed by Ignacio Shone ([id number]) on 3/28/2022 8:45:36 PM     DRUG SCREEN, URINE    Collection Time: 03/28/22  7:10 PM   Result Value Ref Range    PCP(PHENCYCLIDINE) Negative      BENZODIAZEPINES Negative      COCAINE Negative      AMPHETAMINES Positive METHADONE Negative      THC (TH-CANNABINOL) Positive      OPIATES Negative      BARBITURATES Negative     POC URINE MACROSCOPIC    Collection Time: 03/28/22  7:19 PM   Result Value Ref Range    Spec. gravity (POC) >1.030 (H) 1.001 - 1.023    pH, urine  (POC) 5.5 5.0 - 9.0      Protein (POC) 30 (A) NEG mg/dL    Glucose, urine (POC) Negative NEG mg/dL    Ketones (POC) 15 (A) NEG mg/dL    Bilirubin (POC) SMALL (A) NEG      Blood (POC) Trace Intact (A) NEG      Urobilinogen (POC) 0.2 0.2 - 1.0 EU/dL    Nitrite (POC) Negative NEG      Leukocyte esterase (POC) Negative NEG     LITHIUM    Collection Time: 03/29/22 12:03 AM   Result Value Ref Range    Lithium level 1.40 (H) 0.60 - 1.20 MMOL/L   LITHIUM    Collection Time: 03/29/22  5:54 AM   Result Value Ref Range    Lithium level 2.10 (HH) 0.60 - 1.20 MMOL/L   CBC WITH AUTOMATED DIFF    Collection Time: 03/29/22  8:45 AM   Result Value Ref Range    WBC 7.8 4.3 - 11.1 K/uL    RBC 4.14 4.05 - 5.2 M/uL    HGB 12.2 11.7 - 15.4 g/dL    HCT 37.3 35.8 - 46.3 %    MCV 90.1 79.6 - 97.8 FL    MCH 29.5 26.1 - 32.9 PG    MCHC 32.7 31.4 - 35.0 g/dL    RDW 13.2 11.9 - 14.6 %    PLATELET 575 235 - 800 K/uL    MPV 11.1 9.4 - 12.3 FL    ABSOLUTE NRBC 0.00 0.0 - 0.2 K/uL    DF AUTOMATED      NEUTROPHILS 62 43 - 78 %    LYMPHOCYTES 28 13 - 44 %    MONOCYTES 8 4.0 - 12.0 %    EOSINOPHILS 1 0.5 - 7.8 %    BASOPHILS 0 0.0 - 2.0 %    IMMATURE GRANULOCYTES 0 0.0 - 5.0 %    ABS. NEUTROPHILS 4.9 1.7 - 8.2 K/UL    ABS. LYMPHOCYTES 2.2 0.5 - 4.6 K/UL    ABS. MONOCYTES 0.6 0.1 - 1.3 K/UL    ABS. EOSINOPHILS 0.1 0.0 - 0.8 K/UL    ABS. BASOPHILS 0.0 0.0 - 0.2 K/UL    ABS. IMM.  GRANS. 0.0 0.0 - 0.5 K/UL   METABOLIC PANEL, BASIC    Collection Time: 03/29/22  8:45 AM   Result Value Ref Range    Sodium 142 136 - 145 mmol/L    Potassium 3.7 3.5 - 5.1 mmol/L    Chloride 113 (H) 98 - 107 mmol/L    CO2 23 21 - 32 mmol/L    Anion gap 6 (L) 7 - 16 mmol/L    Glucose 89 65 - 100 mg/dL    BUN 9 6 - 23 MG/DL Creatinine 0.60 0.6 - 1.0 MG/DL    GFR est AA >60 >60 ml/min/1.73m2    GFR est non-AA >60 >60 ml/min/1.73m2    Calcium 9.0 8.3 - 10.4 MG/DL   LITHIUM    Collection Time: 03/29/22  8:45 AM   Result Value Ref Range    Lithium level 2.00 (HH) 0.60 - 1.20 MMOL/L       All Micro Results     None          Other Studies:  XR ABD (KUB)    Result Date: 3/28/2022  KUB CLINICAL INDICATION: Overdose FINDINGS: Two supine views of the abdomen and pelvis submitted. Stool is noted throughout the entirety of the long colon. No dilated loops of small bowel evident. Cholecystectomy clips are present. No acute abdominal or pelvic abnormality. Current Meds:  Current Facility-Administered Medications   Medication Dose Route Frequency    sodium chloride (NS) flush 5-40 mL  5-40 mL IntraVENous Q8H    sodium chloride (NS) flush 5-40 mL  5-40 mL IntraVENous PRN    ondansetron (ZOFRAN ODT) tablet 4 mg  4 mg Oral Q8H PRN    Or    ondansetron (ZOFRAN) injection 4 mg  4 mg IntraVENous Q6H PRN    enoxaparin (LOVENOX) injection 40 mg  40 mg SubCUTAneous DAILY    0.9% sodium chloride infusion  200 mL/hr IntraVENous CONTINUOUS       Signed:  Chelo De La Cruz MD    Part of this note may have been written by using a voice dictation software. The note has been proof read but may still contain some grammatical/other typographical errors.

## 2022-03-29 NOTE — ED NOTES
Sutures removed from pt's R leg as ordered by provider. Wound edges approximated. Pt tolerated suture removal well. Pt denies complaints and is updated on POC by this RN. Pt remains in line of sight of nursing station.

## 2022-03-29 NOTE — PROGRESS NOTES
Pt bracelets placed in the narcotics lock box at the nurses station due to suicide risk. Pt medication and bracelets given to security for safety until pt discharge.

## 2022-03-29 NOTE — ED NOTES
Pt resting in stretcher, resps are even and unlabored. Pt verbalizes no complaints. Pt remains in front of nursing station and charge RN for safety.

## 2022-03-29 NOTE — ED NOTES
Lithium level drawn. Pt resting in stretcher w/ eyes closed. resps even and unlabored. Pt is in line of sight of nursing station and charge RN. Pt verbalizes no complaints.

## 2022-03-29 NOTE — ED NOTES
Pt updated on POC. Pt resting in stretcher and requested beverage. Water provided per pt request.  Pt a/ox4. Pt resps even and unlabored. Pt awaiting admission bed.

## 2022-03-29 NOTE — ROUTINE PROCESS
Bedside and Verbal shift change report given to Belton Nyhan, RN (oncoming nurse) by Rajeev Mcgregor (offgoing nurse). Report included the following information SBAR, Kardex, ED Summary, [facility] and Recent Results.

## 2022-03-29 NOTE — ED NOTES
Pt resting in stretcher w/ eyes closed. Pt verbalizes no complaints. Pt is remains connected to cardiac/ pulse ox monitors. Pt resps even and unlabored. Pt in line of sight of nursing station and charge RN desk.

## 2022-03-29 NOTE — ED NOTES
TRANSFER - OUT REPORT:    Verbal report given to [facility] on [address]  being transferred to [phone]) for routine progression of care       Report consisted of patients Situation, Background, Assessment and   Recommendations(SBAR). Information from the following report(s) SBAR, ED Summary, Procedure Summary, MAR and Cardiac Rhythm SR/ST was reviewed with the receiving nurse. Lines:   Peripheral IV 03/28/22 Anterior;Proximal;Right Forearm (Active)        Opportunity for questions and clarification was provided.       Patient transported with:   Monitor  Registered Nurse

## 2022-03-29 NOTE — ED NOTES
Pt resting in stretcher in w/ eyes closed and verbalizes no complaints. resps even and unlabored. Pt remains in front of nursing station and charge RN.

## 2022-03-29 NOTE — ASSESSMENT & PLAN NOTE
- UDS on presentation is positive for amphetamines (and THC)  - Per chart review, patient has h/o meth and illicit substance induced behavioral disturbances

## 2022-03-29 NOTE — CONSULTS
Nephrology consult    Admission Date:  3/28/2022    Admission Diagnosis  Intentional lithium overdose [facility]) [B20.494O]    History of Present Illness:    Ms. Nata Lerner is a 45 yo F with a PMH of bipolar disorder treated with lithium who presented with a suicide attempt by intentional overdose of her lithium. Her Lithium level initially was 0.7 but has trended up to 2.1 this morning. Renal function and electrolytes are normal.  She was admitted and started on NS at 200cc/hr. This morning, patient is awake and alert. She is eating breakfast.  She denies any complaints. Nephrology consulted for evaluation.     Past Medical History:   Diagnosis Date    Ankle fracture, left     Psychiatric disorder     depression and axiety, no treatment at this time      Past Surgical History:   Procedure Laterality Date    HX GYN  1996    rectal reconstruction after child iris    HX OTHER SURGICAL      HX TUBAL LIGATION  1996      Current Facility-Administered Medications   Medication Dose Route Frequency    sodium chloride (NS) flush 5-40 mL  5-40 mL IntraVENous Q8H    sodium chloride (NS) flush 5-40 mL  5-40 mL IntraVENous PRN    ondansetron (ZOFRAN ODT) tablet 4 mg  4 mg Oral Q8H PRN    Or    ondansetron (ZOFRAN) injection 4 mg  4 mg IntraVENous Q6H PRN    enoxaparin (LOVENOX) injection 40 mg  40 mg SubCUTAneous DAILY    0.9% sodium chloride infusion  200 mL/hr IntraVENous CONTINUOUS     Allergies   Allergen Reactions    Bactrim [Sulfamethoprim Ds] Swelling      Social History     Tobacco Use    Smoking status: Current Every Day Smoker     Packs/day: 2.00     Years: 26.00     Pack years: 52.00    Smokeless tobacco: Never Used   Substance Use Topics    Alcohol use: No      Family History   Problem Relation Age of Onset    Cancer Mother     Cancer Father     Lung Disease Father         Review of Systems  Per HPI    Objective:     Vitals:    03/29/22 0329 03/29/22 0353 03/29/22 0429 03/29/22 0711   BP: (!) 95/59  (!) 110/54 108/65   Pulse: 93  91 81   Resp: 13  18 16   Temp:  98.8 °F (37.1 °C) 98.4 °F (36.9 °C) 98.2 °F (36.8 °C)   SpO2: 95%  97% 96%   Weight:   59.7 kg (131 lb 9.6 oz)    Height:   5' 6\" (1.676 m)        Intake/Output Summary (Last 24 hours) at 3/29/2022 6562  Last data filed at 3/28/2022 2050  Gross per 24 hour   Intake 1000 ml   Output --   Net 1000 ml       Physical Exam  GEN :in no distress, alert and oriented  HEENT: anicteric sclerae, eomi. Oropharynx without lesions. Mucous membranes are moist.  Neck - supple without JVD, no thyromegaly. No lymphadenopathy. CV - regular rate and rhythm, no murmur, no rub  Lung - clear bilaterally, lungs expand symmetrically  Chest wall - normal appearance  Abd - soft, nontender, bowel sounds present, no hepatosplenomegaly  Ext - no clubbing, no cyanosis, no edema  Neurologic - nonfocal  Genitourinary - bladder nonpalpable  Skin - no rashes, no purpura, no ecchymoses  Psychiatric: s/p suicide attempt      Data Review:   Recent Labs     03/29/22  0845 03/28/22  1857   WBC 7.8 9.1   HGB 12.2 13.4   HCT 37.3 40.6    355     Recent Labs     03/28/22  1857      K 4.1   *   CO2 24   BUN 13   CREA 0.70   *   CA 9.7   MG 1.9     No results for input(s): PH, PCO2, PO2, PCO2 in the last 72 hours. Problem List:     Patient Active Problem List    Diagnosis Date Noted    Intentional lithium overdose ([facility] 03/29/2022    Bipolar 1 disorder, depressed, severe ([facility] 11/27/2020    History of methamphetamine abuse ([facility] 01/26/2018       Impression:    Plan:     Lithium toxicity-  So far she is not symptomatic with stable renal function and electrolytes. Agree with IVF. Will monitor Lithium levels q 4hrs until they peak. Hopefully, we will be able to eliminate lithium without requiring dialysis.

## 2022-03-29 NOTE — H&P
Hospitalist History and Physical   Admit Date:  3/28/2022  6:49 PM   Name:  Ludy Boothe   Age:  46 y.o. Sex:  female  :  1970   MRN:  [id number]     Presenting Complaint: lithium overdose  Reason(s) for Admission: Intentional lithium overdose ([facility] [O53.359X]     History of Present Illness:   Ludy Boothe is a 46 y.o. female with medical history of bipolar disorder who presented to ED with reported intentional overdose of lithium. Patient is currently somnolent and not a good historian. Patient apparently took an unknown amount of lithium and \"something else\" in an attempt to commit suicide. She has her Lithium bottle with her. The quality dispensed was 120, and there are currently 105 pills counted by ER nurse. Upon ER workup, UDS is positive for amphetamines and THC. Lithium level is currently 1.4 (increased from 0.7 upon presentation at 18:57 yesterday evening). Hospitalist consulted for admission for monitoring of condition. Patient's vital signs are currently stable. She is somnolent, but rousable. Awaiting Psych consultation, but patient will be on papers and need a sitter. Review of Systems:  10 systems reviewed and negative except as noted in HPI. Assessment & Plan:   * Intentional lithium overdose ([facility]  - Reportedly suicidal  - Psychiatry consultation pending  - Patient is on commitment papers  - Will need sitter  - Currently somnolent but rousable  - Lithium level elevated at 1.4, but not extremely yet. Will continue to trend.   - Monitor renal function and respiratory status  - Admit with remote tele orders    History of methamphetamine abuse ([facility]  - UDS on presentation is positive for amphetamines (and THC)  - Per chart review, patient has h/o meth and illicit substance induced behavioral disturbances    Bipolar 1 disorder, depressed, severe ([facility]  - Holding home meds for time being given intention overdose of lithium with elevated lithium level  - Psychiatry consulted due to suicide attempt       Disposition: inpatient    Diet: regular  VTE ppx: lovenox  Code status: FULL      Past medical history reviewed. Past Medical History:   Diagnosis Date    Ankle fracture, left     Psychiatric disorder     depression and axiety, no treatment at this time     Past surgical history reviewed. Past Surgical History:   Procedure Laterality Date    HX GYN  1996    rectal reconstruction after child iris    HX OTHER SURGICAL      HX TUBAL LIGATION  1996      Allergies   Allergen Reactions    Bactrim [Sulfamethoprim Ds] Swelling      Social History     Tobacco Use    Smoking status: Current Every Day Smoker     Packs/day: 2.00     Years: 26.00     Pack years: 52.00    Smokeless tobacco: Never Used   Substance Use Topics    Alcohol use: No      Family History   Problem Relation Age of Onset    Cancer Mother     Cancer Father     Lung Disease Father       Family history reviewed and noncontributory to patient's acute condition; no relevant family history unless otherwise noted above.   Immunization History   Administered Date(s) Administered    Tdap 03/05/2022     PTA Medications:  Current Outpatient Medications   Medication Instructions    biotin 1,000 mg, DAILY    buPROPion XL (WELLBUTRIN XL) 150 mg, Oral, 7AM    diphenhydrAMINE (BENADRYL) 50 mg, Oral    gabapentin (NEURONTIN) 200 mg, Oral, 3 TIMES DAILY    hydrOXYzine HCL (ATARAX) 25 mg, Oral, EVERY 4 HOURS AS NEEDED    lithium carbonate SR (LITHOBID) 1,200 mg, Oral    melatonin 3 mg, EVERY BEDTIME    oxyCODONE IR (ROXICODONE) 10 mg, Oral, EVERY 4 HOURS AS NEEDED    prazosin (MINIPRESS) 1 mg, Oral    risperiDONE (RISPERDAL) 2 mg, Oral, EVERY BEDTIME    traZODone (DESYREL) 50 mg, Oral       Objective:     Patient Vitals for the past 24 hrs:   Temp Pulse Resp BP SpO2   03/29/22 0215 -- (!) 101 14 -- 97 %   03/29/22 0200 -- -- -- (!) 95/56 92 %   03/29/22 0130 -- -- -- 119/65 97 %   03/29/22 0100 -- -- -- 113/75 98 %   03/29/22 0000 -- -- -- 121/74 97 %   03/28/22 2330 -- -- -- 118/72 98 %   03/28/22 2300 -- 91 -- 119/72 99 %   03/28/22 2230 -- 93 -- 114/78 97 %   03/28/22 2200 -- 98 -- 103/61 95 %   03/28/22 2133 -- (!) 107 13 116/71 97 %   03/28/22 2103 -- 98 14 109/68 98 %   03/28/22 2050 -- 98 14 -- 97 %   03/28/22 1950 -- (!) 101 11 (!) 96/54 100 %   03/28/22 1935 -- (!) 104 14 (!) 102/56 --   03/28/22 1907 -- (!) 114 15 -- 99 %   03/28/22 1855 98.5 °F (36.9 °C) -- -- -- --   03/28/22 1851 -- (!) 113 16 94/78 99 %     Oxygen Therapy  O2 Sat (%): 97 % (03/29/22 0215)  Pulse via Oximetry: 100 beats per minute (03/29/22 0215)  O2 Device: None (Room air) (03/28/22 1851)    Estimated body mass index is 23.4 kg/m² as calculated from the following:    Height as of this encounter: 5' 6\" (1.676 m). Weight as of this encounter: 65.8 kg (145 lb). Intake/Output Summary (Last 24 hours) at 3/29/2022 0304  Last data filed at 3/28/2022 2050  Gross per 24 hour   Intake 1000 ml   Output --   Net 1000 ml         Physical Exam:  General:    Well nourished. Somnolent, but easily rousable. Head:  Normocephalic, atraumatic  Eyes:  Sclerae appear normal.  Pupils equally round. HENT:  Nares appear normal, no drainage. Moist mucous membranes  Neck:  No restricted ROM. Trachea midline  CV:   RRR. S1/S2 auscultated  Lungs:   CTAB. No wheezing, rhonchi, or rales. Appears even, unlabored  Abdomen: Bowel sounds present. Soft, nontender, nondistended. Extremities: Warm and dry. No cyanosis or clubbing. No edema. Skin:     No rashes. Normal turgor. Normal coloration  Neuro:  Cranial nerves II-XII grossly intact. Sensation intact  Psych:  Suicidal ideation.     Data Ordered and Personally Reviewed:    Last 24hr Labs:  Recent Results (from the past 24 hour(s))   CBC WITH AUTOMATED DIFF    Collection Time: 03/28/22  6:57 PM   Result Value Ref Range    WBC 9.1 4.3 - 11.1 K/uL    RBC 4.55 4.05 - 5.2 M/uL    HGB 13.4 11.7 - 15.4 g/dL    HCT 40.6 35.8 - 46.3 %    MCV 89.2 79.6 - 97.8 FL    MCH 29.5 26.1 - 32.9 PG    MCHC 33.0 31.4 - 35.0 g/dL    RDW 13.4 11.9 - 14.6 %    PLATELET 511 309 - 353 K/uL    MPV 11.0 9.4 - 12.3 FL    ABSOLUTE NRBC 0.00 0.0 - 0.2 K/uL    DF AUTOMATED      NEUTROPHILS 70 43 - 78 %    LYMPHOCYTES 23 13 - 44 %    MONOCYTES 6 4.0 - 12.0 %    EOSINOPHILS 1 0.5 - 7.8 %    BASOPHILS 0 0.0 - 2.0 %    IMMATURE GRANULOCYTES 0 0.0 - 5.0 %    ABS. NEUTROPHILS 6.3 1.7 - 8.2 K/UL    ABS. LYMPHOCYTES 2.1 0.5 - 4.6 K/UL    ABS. MONOCYTES 0.5 0.1 - 1.3 K/UL    ABS. EOSINOPHILS 0.1 0.0 - 0.8 K/UL    ABS. BASOPHILS 0.0 0.0 - 0.2 K/UL    ABS. IMM. GRANS. 0.0 0.0 - 0.5 K/UL   METABOLIC PANEL, COMPREHENSIVE    Collection Time: 03/28/22  6:57 PM   Result Value Ref Range    Sodium 141 136 - 145 mmol/L    Potassium 4.1 3.5 - 5.1 mmol/L    Chloride 108 (H) 98 - 107 mmol/L    CO2 24 21 - 32 mmol/L    Anion gap 9 7 - 16 mmol/L    Glucose 116 (H) 65 - 100 mg/dL    BUN 13 6 - 23 MG/DL    Creatinine 0.70 0.6 - 1.0 MG/DL    GFR est AA >60 >60 ml/min/1.73m2    GFR est non-AA >60 >60 ml/min/1.73m2    Calcium 9.7 8.3 - 10.4 MG/DL    Bilirubin, total 0.6 0.2 - 1.1 MG/DL    ALT (SGPT) 16 12 - 65 U/L    AST (SGOT) 9 (L) 15 - 37 U/L    Alk.  phosphatase 80 50 - 136 U/L    Protein, total 6.8 6.3 - 8.2 g/dL    Albumin 4.0 3.5 - 5.0 g/dL    Globulin 2.8 2.3 - 3.5 g/dL    A-G Ratio 1.4 1.2 - 3.5     ACETAMINOPHEN    Collection Time: 03/28/22  6:57 PM   Result Value Ref Range    Acetaminophen level <2 (L) 10.0 - 30.0 ug/mL   ETHYL ALCOHOL    Collection Time: 03/28/22  6:57 PM   Result Value Ref Range    ALCOHOL(ETHYL),SERUM <3 MG/DL   MAGNESIUM    Collection Time: 03/28/22  6:57 PM   Result Value Ref Range    Magnesium 1.9 1.8 - 2.4 mg/dL   LITHIUM    Collection Time: 03/28/22  6:57 PM   Result Value Ref Range    Lithium level 0.70 0.60 - 1.20 MMOL/L   TSH 3RD GENERATION    Collection Time: 03/28/22  6:57 PM   Result Value Ref Range    TSH 0.650 0.358 - 2.695 uIU/mL   SALICYLATE    Collection Time: 03/28/22  6:57 PM   Result Value Ref Range    Salicylate level 4.6 2.8 - 20.0 MG/DL   EKG, 12 LEAD, INITIAL    Collection Time: 03/28/22  6:57 PM   Result Value Ref Range    Ventricular Rate 109 BPM    Atrial Rate 108 BPM    P-R Interval 121 ms    QRS Duration 108 ms    Q-T Interval 359 ms    QTC Calculation (Bezet) 484 ms    Calculated P Axis 91 degrees    Calculated R Axis 85 degrees    Calculated T Axis 64 degrees    Diagnosis       Sinus tachycardia  Right atrial enlargement  Baseline wander in lead(s) V3    Confirmed by Susan Coates ([id number]) on 3/28/2022 8:45:36 PM     DRUG SCREEN, URINE    Collection Time: 03/28/22  7:10 PM   Result Value Ref Range    PCP(PHENCYCLIDINE) Negative      BENZODIAZEPINES Negative      COCAINE Negative      AMPHETAMINES Positive      METHADONE Negative      THC (TH-CANNABINOL) Positive      OPIATES Negative      BARBITURATES Negative     LITHIUM    Collection Time: 03/29/22 12:03 AM   Result Value Ref Range    Lithium level 1.40 (H) 0.60 - 1.20 MMOL/L       All Micro Results     None          Other Studies:  XR ABD (KUB)    Result Date: 3/28/2022  KUB CLINICAL INDICATION: Overdose FINDINGS: Two supine views of the abdomen and pelvis submitted. Stool is noted throughout the entirety of the long colon. No dilated loops of small bowel evident. Cholecystectomy clips are present. No acute abdominal or pelvic abnormality.         Medications Administered     0.9% sodium chloride infusion     Admin Date  03/29/2022 Action  New Bag Dose  150 mL/hr Rate  150 mL/hr Route  IntraVENous Administered By  Nelly Muse RN          0.9% sodium chloride infusion 1,000 mL     Admin Date  03/28/2022 Action  New Bag Dose  1,000 mL Rate  2,000 mL/hr Route  IntraVENous Administered By  Nelly Muse RN                  Signed:  Rosa Ornelas MD

## 2022-03-30 LAB
ANION GAP SERPL CALC-SCNC: 4 MMOL/L (ref 7–16)
BASOPHILS # BLD: 0 K/UL (ref 0–0.2)
BASOPHILS NFR BLD: 0 % (ref 0–2)
BUN SERPL-MCNC: 9 MG/DL (ref 6–23)
CALCIUM SERPL-MCNC: 8.6 MG/DL (ref 8.3–10.4)
CHLORIDE SERPL-SCNC: 116 MMOL/L (ref 98–107)
CO2 SERPL-SCNC: 23 MMOL/L (ref 21–32)
CREAT SERPL-MCNC: 0.7 MG/DL (ref 0.6–1)
DIFFERENTIAL METHOD BLD: ABNORMAL
EOSINOPHIL # BLD: 0.2 K/UL (ref 0–0.8)
EOSINOPHIL NFR BLD: 3 % (ref 0.5–7.8)
ERYTHROCYTE [DISTWIDTH] IN BLOOD BY AUTOMATED COUNT: 13.4 % (ref 11.9–14.6)
GLUCOSE SERPL-MCNC: 101 MG/DL (ref 65–100)
HCT VFR BLD AUTO: 33 % (ref 35.8–46.3)
HGB BLD-MCNC: 10.5 G/DL (ref 11.7–15.4)
IMM GRANULOCYTES # BLD AUTO: 0 K/UL (ref 0–0.5)
IMM GRANULOCYTES NFR BLD AUTO: 0 % (ref 0–5)
LITHIUM SERPL-SCNC: 1 MMOL/L (ref 0.6–1.2)
LYMPHOCYTES # BLD: 2.8 K/UL (ref 0.5–4.6)
LYMPHOCYTES NFR BLD: 41 % (ref 13–44)
MCH RBC QN AUTO: 29.2 PG (ref 26.1–32.9)
MCHC RBC AUTO-ENTMCNC: 31.8 G/DL (ref 31.4–35)
MCV RBC AUTO: 91.7 FL (ref 79.6–97.8)
MONOCYTES # BLD: 0.7 K/UL (ref 0.1–1.3)
MONOCYTES NFR BLD: 10 % (ref 4–12)
NEUTS SEG # BLD: 3.1 K/UL (ref 1.7–8.2)
NEUTS SEG NFR BLD: 46 % (ref 43–78)
NRBC # BLD: 0 K/UL (ref 0–0.2)
PLATELET # BLD AUTO: 302 K/UL (ref 150–450)
PMV BLD AUTO: 10.6 FL (ref 9.4–12.3)
POTASSIUM SERPL-SCNC: 3.5 MMOL/L (ref 3.5–5.1)
RBC # BLD AUTO: 3.6 M/UL (ref 4.05–5.2)
SODIUM SERPL-SCNC: 143 MMOL/L (ref 136–145)
WBC # BLD AUTO: 6.8 K/UL (ref 4.3–11.1)

## 2022-03-30 PROCEDURE — 36415 COLL VENOUS BLD VENIPUNCTURE: CPT

## 2022-03-30 PROCEDURE — 65660000000 HC RM CCU STEPDOWN

## 2022-03-30 PROCEDURE — 74011000250 HC RX REV CODE- 250: Performed by: FAMILY MEDICINE

## 2022-03-30 PROCEDURE — 80178 ASSAY OF LITHIUM: CPT

## 2022-03-30 PROCEDURE — 85025 COMPLETE CBC W/AUTO DIFF WBC: CPT

## 2022-03-30 PROCEDURE — 2709999900 HC NON-CHARGEABLE SUPPLY

## 2022-03-30 PROCEDURE — 74011250636 HC RX REV CODE- 250/636: Performed by: FAMILY MEDICINE

## 2022-03-30 PROCEDURE — 80048 BASIC METABOLIC PNL TOTAL CA: CPT

## 2022-03-30 RX ADMIN — SODIUM CHLORIDE 200 ML/HR: 900 INJECTION, SOLUTION INTRAVENOUS at 03:07

## 2022-03-30 RX ADMIN — SODIUM CHLORIDE, PRESERVATIVE FREE 10 ML: 5 INJECTION INTRAVENOUS at 22:23

## 2022-03-30 RX ADMIN — SODIUM CHLORIDE, PRESERVATIVE FREE 5 ML: 5 INJECTION INTRAVENOUS at 15:05

## 2022-03-30 RX ADMIN — SODIUM CHLORIDE, PRESERVATIVE FREE 10 ML: 5 INJECTION INTRAVENOUS at 06:21

## 2022-03-30 RX ADMIN — SODIUM CHLORIDE 200 ML/HR: 900 INJECTION, SOLUTION INTRAVENOUS at 08:38

## 2022-03-30 RX ADMIN — ENOXAPARIN SODIUM 40 MG: 100 INJECTION SUBCUTANEOUS at 08:36

## 2022-03-30 NOTE — PROGRESS NOTES
CM able to get some information from the pt. Hermelindater present. Pt would intermittently nod off during questioning but responded appropriately; guarded. She reported that she lives in a home \"with someone. \" She reported that she had an argument with a \"friend\" and took the Lithium after their fight. She would not elaborate, nor would she expand on the h/o D/A. Pt reported that she is no longer suicidal and wanted to go home. Pts IVC papers  tomorrow, 3/31/22. At baseline is independent with her ADLs. Denies DME use. Denies h/o HH and STR. Goes to the [address] [place] [address] for her medications. Denies MH tx. Pt is self-pay. So far, [address] [place] [address] ([phone]) and [facility] ([phone]) MAY have availability, referrals sent. CM has contacted:    [facility] ([phone]); wait list/no availibility  [address] ([phone]; no beds  [facility] ([phone]); no beds and no self-pay pts  [facility] ([phone]); no beds  [facility] ([phone]); won't accept pts outside their census  [facility] ([phone]); wait list/no availability  [facility] ([phone]); no beds    Will continue search. [address] [place] [address] unable to accept pt due to lack of insurance. [address] [place] [address] still reviewing. Care Management Interventions  PCP Verified by CM: Yes ([address] [place] [address])  Mode of Transport at Discharge:  Other (see comment) (TBD)  Transition of Care Consult (CM Consult): Discharge Planning  Discharge Durable Medical Equipment: No  Physical Therapy Consult: No  Occupational Therapy Consult: No  Speech Therapy Consult: No  Support Systems: Other (Comment) (Unable to determine)  Confirm Follow Up Transport: Self  The Plan for Transition of Care is Related to the Following Treatment Goals : Return home and back to her baseline  Discharge Location  Patient Expects to be Discharged to[de-identified] Unable to determine at this time

## 2022-03-30 NOTE — PROGRESS NOTES
Bedside, Verbal and Written shift change report given to Shell Strickland RN (oncoming nurse) by Art Pate RN (offgoing nurse). Report included the following information SBAR, Kardex, Intake/Output, Med Rec Status, Cardiac Rhythm NSR and Dual Neuro Assessment.

## 2022-03-30 NOTE — PROGRESS NOTES
[facility] PROGRESS NOTE    Follow up for:    Subjective:   Patient seen and examined. No complaints. ROS:  Gen - no fever, no chills, appetite okay  CV - no chest pain, no orthopnea  Lung - no shortness of breath, no cough  Abd - no tenderness, no nausea, no vomiting  Ext - no edema    Objective:   Exam:  Vitals:    03/29/22 2034 03/30/22 0105 03/30/22 0407 03/30/22 0754   BP: 109/64 114/71 112/65 107/82   Pulse: 69 74 64 60   Resp: 18 16 18 16   Temp: 97.7 °F (36.5 °C) 98.6 °F (37 °C) 97.9 °F (36.6 °C) 98.1 °F (36.7 °C)   SpO2: 98% 99% 98% 96%   Weight:   62.3 kg (137 lb 6.4 oz)    Height:             Intake/Output Summary (Last 24 hours) at 3/30/2022 0913  Last data filed at 3/29/2022 1220  Gross per 24 hour   Intake 150 ml   Output --   Net 150 ml       Current Facility-Administered Medications   Medication Dose Route Frequency    sodium chloride (NS) flush 5-40 mL  5-40 mL IntraVENous Q8H    sodium chloride (NS) flush 5-40 mL  5-40 mL IntraVENous PRN    ondansetron (ZOFRAN ODT) tablet 4 mg  4 mg Oral Q8H PRN    Or    ondansetron (ZOFRAN) injection 4 mg  4 mg IntraVENous Q6H PRN    enoxaparin (LOVENOX) injection 40 mg  40 mg SubCUTAneous DAILY       EXAM  GEN - Alert, oriented, in no distress  CV - S1, S2, RRR, no rub, murmur, or gallop  Lung - clear to auscultation bilaterally  Abd - soft, nontender, BS present  Ext - no edema    Recent Labs     03/30/22  0241 03/29/22  0845 03/28/22  1857   WBC 6.8 7.8 9.1   HGB 10.5* 12.2 13.4   HCT 33.0* 37.3 40.6    351 355        Recent Labs     03/30/22  0241 03/29/22  0845 03/28/22  1857    142 141   K 3.5 3.7 4.1   * 113* 108*   CO2 23 23 24   BUN 9 9 13   CREA 0.70 0.60 0.70   CA 8.6 9.0 9.7   * 89 116*   MG  --   --  1.9       Assessment and Plan:   Lithium toxicity-  Level down trending. Renal function and electrolytes are stable. Stopping IVF. We will sign off.       Genoveva Stubbs MD

## 2022-03-30 NOTE — PROGRESS NOTES
Hospitalist Progress Note   Admit Date:  3/28/2022  6:49 PM   Name:  Edilson Courtney   Age:  46 y.o. Sex:  female  :  1970   MRN:  [id number]   Room:  318/01    Presenting Complaint: Drug Overdose and Mental Health Problem    Reason(s) for Admission: Intentional lithium overdose [facility] Course & Interval History:     Edilson Courtney is a 46 y.o. female with medical history of bipolar disorder admitted with reported intentional overdose of lithium. Patient apparently took an unknown amount of lithium and \"something else\" in an attempt to commit suicide. She has her Lithium bottle with her. The quality dispensed was 120, and there are currently 105 pills counted by ER nurse. UDS is positive for amphetamines and THC. Started on Fluid resuscitation. Subjective/24hr Events (22): Patient is seen at the bedside. No active complaint. Alert and oriented. Denies chest pain, palpitation, nausea, vomiting or abdominal pain. Denies headache. Patient denies any active suicidal ideation. Psych consulted yesterday and recommend admit inpatient psych and involuntary psych hold. ROS:  10 systems reviewed and negative except as noted above. Assessment & Plan:     Intentional lithium overdose:  Reportedly suicidal  Psychiatry consultation pending  Patient is on commitment paper, need sitter  Currently somnolent but arousable  Lithium level trending up, 2.1 this morning. Will continue to trend  Nephrology consult  Continue fluid resuscitation  Monitor renal function and respiratory status  3/30: Lithium level down to 1. Discontinue IV fluid. Nephrology signed off. Psych consulted yesterday and recommend admit inpatient psych and involuntary psych hold. Case management consulted.     Substance abuse:  UDS positive for amphetamine and THC  Counseled    Bipolar disorder:  Holding home meds for time being given intention overdose of lithium with elevated lithium level  Psychiatry consulted due to suicide attempt      Discharge Planning: Inpatient psych and involuntary psych hold. Case management consulted    Diet:  ADULT DIET Regular  DVT PPx: SCD  Code status: Full Code    Hospital Problems as of 3/30/2022 Never Reviewed          Codes Class Noted - Resolved POA    * (Principal) Intentional lithium overdose ([facility] ICD-10-CM: T37.995A  ICD-9-CM: 985.8, E950.9  3/29/2022 - Present Unknown        Bipolar 1 disorder, depressed, severe ([facility] ICD-10-CM: F31.4  ICD-9-CM: 296.53  11/27/2020 - Present Yes        History of methamphetamine abuse ([facility] ICD-10-CM: F15.11  ICD-9-CM: 305.73  1/26/2018 - Present Yes              Objective:     Patient Vitals for the past 24 hrs:   Temp Pulse Resp BP SpO2   03/30/22 1123 98.3 °F (36.8 °C) 70 16 (!) 108/57 93 %   03/30/22 0754 98.1 °F (36.7 °C) 60 16 107/82 96 %   03/30/22 0407 97.9 °F (36.6 °C) 64 18 112/65 98 %   03/30/22 0105 98.6 °F (37 °C) 74 16 114/71 99 %   03/29/22 2034 97.7 °F (36.5 °C) 69 18 109/64 98 %     Oxygen Therapy  O2 Sat (%): 93 % (03/30/22 1123)  Pulse via Oximetry: 93 beats per minute (03/29/22 0329)  O2 Device: None (Room air) (03/30/22 1123)    Estimated body mass index is 22.18 kg/m² as calculated from the following:    Height as of this encounter: 5' 6\" (1.676 m). Weight as of this encounter: 62.3 kg (137 lb 6.4 oz). No intake or output data in the 24 hours ending 03/30/22 1232      Physical Exam:     Blood pressure (!) 108/57, pulse 70, temperature 98.3 °F (36.8 °C), resp. rate 16, height 5' 6\" (1.676 m), weight 62.3 kg (137 lb 6.4 oz), SpO2 93 %. General:    Somnolent but arousable  Head:  Normocephalic, atraumatic  Eyes:  Sclerae appear normal.  Pupils equally round. ENT:  Nares appear normal, no drainage. Moist oral mucosa  Neck:  No restricted ROM. Trachea midline   CV:   RRR. No m/r/g. No jugular venous distension. Lungs:   CTAB. No wheezing, rhonchi, or rales.   Respirations even, unlabored  Abdomen: Bowel sounds present. Soft, nontender, nondistended. Extremities: No cyanosis or clubbing. No edema  Skin:     No rashes and normal coloration. Warm and dry. Neuro:  CN II-XII grossly intact. Sensation intact. A&Ox3  Psych:  Somnolent but arousable    I have reviewed ordered lab tests and independently visualized imaging below:    Recent Labs:  Recent Results (from the past 48 hour(s))   CBC WITH AUTOMATED DIFF    Collection Time: 03/28/22  6:57 PM   Result Value Ref Range    WBC 9.1 4.3 - 11.1 K/uL    RBC 4.55 4.05 - 5.2 M/uL    HGB 13.4 11.7 - 15.4 g/dL    HCT 40.6 35.8 - 46.3 %    MCV 89.2 79.6 - 97.8 FL    MCH 29.5 26.1 - 32.9 PG    MCHC 33.0 31.4 - 35.0 g/dL    RDW 13.4 11.9 - 14.6 %    PLATELET 300 804 - 167 K/uL    MPV 11.0 9.4 - 12.3 FL    ABSOLUTE NRBC 0.00 0.0 - 0.2 K/uL    DF AUTOMATED      NEUTROPHILS 70 43 - 78 %    LYMPHOCYTES 23 13 - 44 %    MONOCYTES 6 4.0 - 12.0 %    EOSINOPHILS 1 0.5 - 7.8 %    BASOPHILS 0 0.0 - 2.0 %    IMMATURE GRANULOCYTES 0 0.0 - 5.0 %    ABS. NEUTROPHILS 6.3 1.7 - 8.2 K/UL    ABS. LYMPHOCYTES 2.1 0.5 - 4.6 K/UL    ABS. MONOCYTES 0.5 0.1 - 1.3 K/UL    ABS. EOSINOPHILS 0.1 0.0 - 0.8 K/UL    ABS. BASOPHILS 0.0 0.0 - 0.2 K/UL    ABS. IMM. GRANS. 0.0 0.0 - 0.5 K/UL   METABOLIC PANEL, COMPREHENSIVE    Collection Time: 03/28/22  6:57 PM   Result Value Ref Range    Sodium 141 136 - 145 mmol/L    Potassium 4.1 3.5 - 5.1 mmol/L    Chloride 108 (H) 98 - 107 mmol/L    CO2 24 21 - 32 mmol/L    Anion gap 9 7 - 16 mmol/L    Glucose 116 (H) 65 - 100 mg/dL    BUN 13 6 - 23 MG/DL    Creatinine 0.70 0.6 - 1.0 MG/DL    GFR est AA >60 >60 ml/min/1.73m2    GFR est non-AA >60 >60 ml/min/1.73m2    Calcium 9.7 8.3 - 10.4 MG/DL    Bilirubin, total 0.6 0.2 - 1.1 MG/DL    ALT (SGPT) 16 12 - 65 U/L    AST (SGOT) 9 (L) 15 - 37 U/L    Alk.  phosphatase 80 50 - 136 U/L    Protein, total 6.8 6.3 - 8.2 g/dL    Albumin 4.0 3.5 - 5.0 g/dL    Globulin 2.8 2.3 - 3.5 g/dL    A-G Ratio 1. 4 1.2 - 3.5     ACETAMINOPHEN    Collection Time: 03/28/22  6:57 PM   Result Value Ref Range    Acetaminophen level <2 (L) 10.0 - 30.0 ug/mL   ETHYL ALCOHOL    Collection Time: 03/28/22  6:57 PM   Result Value Ref Range    ALCOHOL(ETHYL),SERUM <3 MG/DL   MAGNESIUM    Collection Time: 03/28/22  6:57 PM   Result Value Ref Range    Magnesium 1.9 1.8 - 2.4 mg/dL   LITHIUM    Collection Time: 03/28/22  6:57 PM   Result Value Ref Range    Lithium level 0.70 0.60 - 1.20 MMOL/L   TSH 3RD GENERATION    Collection Time: 03/28/22  6:57 PM   Result Value Ref Range    TSH 0.650 0.358 - 8.691 uIU/mL   SALICYLATE    Collection Time: 03/28/22  6:57 PM   Result Value Ref Range    Salicylate level 4.6 2.8 - 20.0 MG/DL   EKG, 12 LEAD, INITIAL    Collection Time: 03/28/22  6:57 PM   Result Value Ref Range    Ventricular Rate 109 BPM    Atrial Rate 108 BPM    P-R Interval 121 ms    QRS Duration 108 ms    Q-T Interval 359 ms    QTC Calculation (Bezet) 484 ms    Calculated P Axis 91 degrees    Calculated R Axis 85 degrees    Calculated T Axis 64 degrees    Diagnosis       Sinus tachycardia  Right atrial enlargement  Baseline wander in lead(s) V3    Confirmed by Jorge Manriquez ([id number]) on 3/28/2022 8:45:36 PM     DRUG SCREEN, URINE    Collection Time: 03/28/22  7:10 PM   Result Value Ref Range    PCP(PHENCYCLIDINE) Negative      BENZODIAZEPINES Negative      COCAINE Negative      AMPHETAMINES Positive      METHADONE Negative      THC (TH-CANNABINOL) Positive      OPIATES Negative      BARBITURATES Negative     POC URINE MACROSCOPIC    Collection Time: 03/28/22  7:19 PM   Result Value Ref Range    Spec. gravity (POC) >1.030 (H) 1.001 - 1.023    pH, urine  (POC) 5.5 5.0 - 9.0      Protein (POC) 30 (A) NEG mg/dL    Glucose, urine (POC) Negative NEG mg/dL    Ketones (POC) 15 (A) NEG mg/dL    Bilirubin (POC) SMALL (A) NEG      Blood (POC) Trace Intact (A) NEG      Urobilinogen (POC) 0.2 0.2 - 1.0 EU/dL    Nitrite (POC) Negative NEG Leukocyte esterase (POC) Negative NEG     LITHIUM    Collection Time: 03/29/22 12:03 AM   Result Value Ref Range    Lithium level 1.40 (H) 0.60 - 1.20 MMOL/L   LITHIUM    Collection Time: 03/29/22  5:54 AM   Result Value Ref Range    Lithium level 2.10 (HH) 0.60 - 1.20 MMOL/L   CBC WITH AUTOMATED DIFF    Collection Time: 03/29/22  8:45 AM   Result Value Ref Range    WBC 7.8 4.3 - 11.1 K/uL    RBC 4.14 4.05 - 5.2 M/uL    HGB 12.2 11.7 - 15.4 g/dL    HCT 37.3 35.8 - 46.3 %    MCV 90.1 79.6 - 97.8 FL    MCH 29.5 26.1 - 32.9 PG    MCHC 32.7 31.4 - 35.0 g/dL    RDW 13.2 11.9 - 14.6 %    PLATELET 894 068 - 726 K/uL    MPV 11.1 9.4 - 12.3 FL    ABSOLUTE NRBC 0.00 0.0 - 0.2 K/uL    DF AUTOMATED      NEUTROPHILS 62 43 - 78 %    LYMPHOCYTES 28 13 - 44 %    MONOCYTES 8 4.0 - 12.0 %    EOSINOPHILS 1 0.5 - 7.8 %    BASOPHILS 0 0.0 - 2.0 %    IMMATURE GRANULOCYTES 0 0.0 - 5.0 %    ABS. NEUTROPHILS 4.9 1.7 - 8.2 K/UL    ABS. LYMPHOCYTES 2.2 0.5 - 4.6 K/UL    ABS. MONOCYTES 0.6 0.1 - 1.3 K/UL    ABS. EOSINOPHILS 0.1 0.0 - 0.8 K/UL    ABS. BASOPHILS 0.0 0.0 - 0.2 K/UL    ABS. IMM.  GRANS. 0.0 0.0 - 0.5 K/UL   METABOLIC PANEL, BASIC    Collection Time: 03/29/22  8:45 AM   Result Value Ref Range    Sodium 142 136 - 145 mmol/L    Potassium 3.7 3.5 - 5.1 mmol/L    Chloride 113 (H) 98 - 107 mmol/L    CO2 23 21 - 32 mmol/L    Anion gap 6 (L) 7 - 16 mmol/L    Glucose 89 65 - 100 mg/dL    BUN 9 6 - 23 MG/DL    Creatinine 0.60 0.6 - 1.0 MG/DL    GFR est AA >60 >60 ml/min/1.73m2    GFR est non-AA >60 >60 ml/min/1.73m2    Calcium 9.0 8.3 - 10.4 MG/DL   LITHIUM    Collection Time: 03/29/22  8:45 AM   Result Value Ref Range    Lithium level 2.00 (HH) 0.60 - 1.20 MMOL/L   LITHIUM    Collection Time: 03/29/22  1:59 PM   Result Value Ref Range    Lithium level 1.70 (HH) 0.60 - 1.20 MMOL/L   LITHIUM    Collection Time: 03/30/22  2:41 AM   Result Value Ref Range    Lithium level 1.00 0.60 - 1.20 MMOL/L   CBC WITH AUTOMATED DIFF    Collection Time: 03/30/22  2:41 AM   Result Value Ref Range    WBC 6.8 4.3 - 11.1 K/uL    RBC 3.60 (L) 4.05 - 5.2 M/uL    HGB 10.5 (L) 11.7 - 15.4 g/dL    HCT 33.0 (L) 35.8 - 46.3 %    MCV 91.7 79.6 - 97.8 FL    MCH 29.2 26.1 - 32.9 PG    MCHC 31.8 31.4 - 35.0 g/dL    RDW 13.4 11.9 - 14.6 %    PLATELET 507 997 - 236 K/uL    MPV 10.6 9.4 - 12.3 FL    ABSOLUTE NRBC 0.00 0.0 - 0.2 K/uL    DF AUTOMATED      NEUTROPHILS 46 43 - 78 %    LYMPHOCYTES 41 13 - 44 %    MONOCYTES 10 4.0 - 12.0 %    EOSINOPHILS 3 0.5 - 7.8 %    BASOPHILS 0 0.0 - 2.0 %    IMMATURE GRANULOCYTES 0 0.0 - 5.0 %    ABS. NEUTROPHILS 3.1 1.7 - 8.2 K/UL    ABS. LYMPHOCYTES 2.8 0.5 - 4.6 K/UL    ABS. MONOCYTES 0.7 0.1 - 1.3 K/UL    ABS. EOSINOPHILS 0.2 0.0 - 0.8 K/UL    ABS. BASOPHILS 0.0 0.0 - 0.2 K/UL    ABS. IMM. GRANS. 0.0 0.0 - 0.5 K/UL   METABOLIC PANEL, BASIC    Collection Time: 03/30/22  2:41 AM   Result Value Ref Range    Sodium 143 136 - 145 mmol/L    Potassium 3.5 3.5 - 5.1 mmol/L    Chloride 116 (H) 98 - 107 mmol/L    CO2 23 21 - 32 mmol/L    Anion gap 4 (L) 7 - 16 mmol/L    Glucose 101 (H) 65 - 100 mg/dL    BUN 9 6 - 23 MG/DL    Creatinine 0.70 0.6 - 1.0 MG/DL    GFR est AA >60 >60 ml/min/1.73m2    GFR est non-AA >60 >60 ml/min/1.73m2    Calcium 8.6 8.3 - 10.4 MG/DL       All Micro Results     None          Other Studies:  No results found. Current Meds:  Current Facility-Administered Medications   Medication Dose Route Frequency    sodium chloride (NS) flush 5-40 mL  5-40 mL IntraVENous Q8H    sodium chloride (NS) flush 5-40 mL  5-40 mL IntraVENous PRN    ondansetron (ZOFRAN ODT) tablet 4 mg  4 mg Oral Q8H PRN    Or    ondansetron (ZOFRAN) injection 4 mg  4 mg IntraVENous Q6H PRN    enoxaparin (LOVENOX) injection 40 mg  40 mg SubCUTAneous DAILY       Signed:  Donato Sandoval MD    Part of this note may have been written by using a voice dictation software.   The note has been proof read but may still contain some grammatical/other typographical errors.

## 2022-03-30 NOTE — ROUTINE PROCESS
Bedside and Verbal shift change report given to myself (oncoming nurse) by Ezio Monzon RN (offgoing nurse). Report included the following information SBAR, Kardex, MAR and Recent Results.

## 2022-03-31 LAB
ANION GAP SERPL CALC-SCNC: 8 MMOL/L (ref 7–16)
BASOPHILS # BLD: 0.1 K/UL (ref 0–0.2)
BASOPHILS NFR BLD: 1 % (ref 0–2)
BUN SERPL-MCNC: 8 MG/DL (ref 6–23)
CALCIUM SERPL-MCNC: 9 MG/DL (ref 8.3–10.4)
CHLORIDE SERPL-SCNC: 114 MMOL/L (ref 98–107)
CO2 SERPL-SCNC: 23 MMOL/L (ref 21–32)
CREAT SERPL-MCNC: 0.6 MG/DL (ref 0.6–1)
DIFFERENTIAL METHOD BLD: NORMAL
EOSINOPHIL # BLD: 0.3 K/UL (ref 0–0.8)
EOSINOPHIL NFR BLD: 4 % (ref 0.5–7.8)
ERYTHROCYTE [DISTWIDTH] IN BLOOD BY AUTOMATED COUNT: 13 % (ref 11.9–14.6)
GLUCOSE SERPL-MCNC: 91 MG/DL (ref 65–100)
HCT VFR BLD AUTO: 38.7 % (ref 35.8–46.3)
HGB BLD-MCNC: 12.5 G/DL (ref 11.7–15.4)
IMM GRANULOCYTES # BLD AUTO: 0 K/UL (ref 0–0.5)
IMM GRANULOCYTES NFR BLD AUTO: 0 % (ref 0–5)
LYMPHOCYTES # BLD: 2.9 K/UL (ref 0.5–4.6)
LYMPHOCYTES NFR BLD: 38 % (ref 13–44)
MCH RBC QN AUTO: 29.4 PG (ref 26.1–32.9)
MCHC RBC AUTO-ENTMCNC: 32.3 G/DL (ref 31.4–35)
MCV RBC AUTO: 91.1 FL (ref 79.6–97.8)
MONOCYTES # BLD: 0.6 K/UL (ref 0.1–1.3)
MONOCYTES NFR BLD: 8 % (ref 4–12)
NEUTS SEG # BLD: 3.7 K/UL (ref 1.7–8.2)
NEUTS SEG NFR BLD: 49 % (ref 43–78)
NRBC # BLD: 0 K/UL (ref 0–0.2)
PLATELET # BLD AUTO: 330 K/UL (ref 150–450)
PMV BLD AUTO: 10.9 FL (ref 9.4–12.3)
POTASSIUM SERPL-SCNC: 3.8 MMOL/L (ref 3.5–5.1)
RBC # BLD AUTO: 4.25 M/UL (ref 4.05–5.2)
SODIUM SERPL-SCNC: 145 MMOL/L (ref 136–145)
WBC # BLD AUTO: 7.6 K/UL (ref 4.3–11.1)

## 2022-03-31 PROCEDURE — 65660000000 HC RM CCU STEPDOWN

## 2022-03-31 PROCEDURE — 74011250636 HC RX REV CODE- 250/636: Performed by: FAMILY MEDICINE

## 2022-03-31 PROCEDURE — 85025 COMPLETE CBC W/AUTO DIFF WBC: CPT

## 2022-03-31 PROCEDURE — 80048 BASIC METABOLIC PNL TOTAL CA: CPT

## 2022-03-31 PROCEDURE — 74011000250 HC RX REV CODE- 250: Performed by: FAMILY MEDICINE

## 2022-03-31 PROCEDURE — 74011250637 HC RX REV CODE- 250/637: Performed by: FAMILY MEDICINE

## 2022-03-31 PROCEDURE — 36415 COLL VENOUS BLD VENIPUNCTURE: CPT

## 2022-03-31 PROCEDURE — 90792 PSYCH DIAG EVAL W/MED SRVCS: CPT | Performed by: NURSE PRACTITIONER

## 2022-03-31 RX ORDER — IBUPROFEN 200 MG
1 TABLET ORAL EVERY 24 HOURS
Status: DISCONTINUED | OUTPATIENT
Start: 2022-03-31 | End: 2022-04-15

## 2022-03-31 RX ORDER — LORAZEPAM 0.5 MG/1
0.5 TABLET ORAL
Status: DISCONTINUED | OUTPATIENT
Start: 2022-03-31 | End: 2022-04-02

## 2022-03-31 RX ADMIN — LORAZEPAM 0.5 MG: 0.5 TABLET ORAL at 22:52

## 2022-03-31 RX ADMIN — SODIUM CHLORIDE, PRESERVATIVE FREE 10 ML: 5 INJECTION INTRAVENOUS at 06:47

## 2022-03-31 RX ADMIN — SODIUM CHLORIDE, PRESERVATIVE FREE 10 ML: 5 INJECTION INTRAVENOUS at 22:54

## 2022-03-31 RX ADMIN — LORAZEPAM 0.5 MG: 0.5 TABLET ORAL at 15:42

## 2022-03-31 RX ADMIN — ENOXAPARIN SODIUM 40 MG: 100 INJECTION SUBCUTANEOUS at 09:23
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
## 2022-03-31 NOTE — PROGRESS NOTES
Problem: Falls - Risk of  Goal: *Absence of Falls  Description: Document Noemi Robin Fall Risk and appropriate interventions in the flowsheet.   3/31/2022 0741 by Sharon Ribera RN  Outcome: Progressing Towards Goal  Note: Fall Risk Interventions:       Mentation Interventions: Family/sitter at bedside,Door open when patient unattended,Room close to nurse's station         Elimination Interventions: Call light in reach           3/31/2022 0741 by Sharon Ribera RN  Outcome: Progressing Towards Goal  Note: Fall Risk Interventions:       Mentation Interventions: Family/sitter at bedside,Door open when patient unattended,Room close to nurse's station         Elimination Interventions: Call light in reach
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
## 2022-03-31 NOTE — PROGRESS NOTES
Bedside and Verbal report given to [facility]. MARI[facility]TA REY RN by self. Report included SBAR, Kardex, ED summary, procedure summary, recent results.

## 2022-03-31 NOTE — PROGRESS NOTES
Pt.s boyfriend brought in Pt. s belongings. Pt.s boyfriend told that Pt. Cannot have any of her belonging due to her being on suicide precautions. Pt.s boyfriend states he will take Pt. s belongings back home.

## 2022-03-31 NOTE — ROUTINE PROCESS
Bedside and Verbal shift change report given to Bernadette Carr RN (oncoming nurse) by myself (offgoing nurse). Report included the following information SBAR, Kardex, MAR and Recent Results.

## 2022-03-31 NOTE — ROUTINE PROCESS
Bedside and Verbal report given to self by Iglesia Wilkes. Report included SBAR, Kardex, ED Summary, Procedure Summary, Intake and Output and Cardiac Rhythm NSR.

## 2022-04-01 LAB
ANION GAP SERPL CALC-SCNC: 7 MMOL/L (ref 7–16)
BASOPHILS # BLD: 0.1 K/UL (ref 0–0.2)
BASOPHILS NFR BLD: 1 % (ref 0–2)
BUN SERPL-MCNC: 15 MG/DL (ref 6–23)
CALCIUM SERPL-MCNC: 9.8 MG/DL (ref 8.3–10.4)
CHLORIDE SERPL-SCNC: 110 MMOL/L (ref 98–107)
CO2 SERPL-SCNC: 25 MMOL/L (ref 21–32)
CREAT SERPL-MCNC: 0.6 MG/DL (ref 0.6–1)
DIFFERENTIAL METHOD BLD: NORMAL
EOSINOPHIL # BLD: 0.3 K/UL (ref 0–0.8)
EOSINOPHIL NFR BLD: 3 % (ref 0.5–7.8)
ERYTHROCYTE [DISTWIDTH] IN BLOOD BY AUTOMATED COUNT: 12.9 % (ref 11.9–14.6)
GLUCOSE SERPL-MCNC: 91 MG/DL (ref 65–100)
HCT VFR BLD AUTO: 39.6 % (ref 35.8–46.3)
HGB BLD-MCNC: 13 G/DL (ref 11.7–15.4)
IMM GRANULOCYTES # BLD AUTO: 0 K/UL (ref 0–0.5)
IMM GRANULOCYTES NFR BLD AUTO: 0 % (ref 0–5)
LYMPHOCYTES # BLD: 3.3 K/UL (ref 0.5–4.6)
LYMPHOCYTES NFR BLD: 36 % (ref 13–44)
MCH RBC QN AUTO: 29 PG (ref 26.1–32.9)
MCHC RBC AUTO-ENTMCNC: 32.8 G/DL (ref 31.4–35)
MCV RBC AUTO: 88.4 FL (ref 79.6–97.8)
MONOCYTES # BLD: 0.7 K/UL (ref 0.1–1.3)
MONOCYTES NFR BLD: 8 % (ref 4–12)
NEUTS SEG # BLD: 4.9 K/UL (ref 1.7–8.2)
NEUTS SEG NFR BLD: 53 % (ref 43–78)
NRBC # BLD: 0 K/UL (ref 0–0.2)
PLATELET # BLD AUTO: 344 K/UL (ref 150–450)
PMV BLD AUTO: 11.3 FL (ref 9.4–12.3)
POTASSIUM SERPL-SCNC: 3.9 MMOL/L (ref 3.5–5.1)
RBC # BLD AUTO: 4.48 M/UL (ref 4.05–5.2)
SODIUM SERPL-SCNC: 142 MMOL/L (ref 136–145)
WBC # BLD AUTO: 9.2 K/UL (ref 4.3–11.1)

## 2022-04-01 PROCEDURE — 74011250637 HC RX REV CODE- 250/637: Performed by: FAMILY MEDICINE

## 2022-04-01 PROCEDURE — 74011250637 HC RX REV CODE- 250/637: Performed by: INTERNAL MEDICINE

## 2022-04-01 PROCEDURE — 80048 BASIC METABOLIC PNL TOTAL CA: CPT

## 2022-04-01 PROCEDURE — 36415 COLL VENOUS BLD VENIPUNCTURE: CPT

## 2022-04-01 PROCEDURE — 65270000029 HC RM PRIVATE

## 2022-04-01 PROCEDURE — 74011000250 HC RX REV CODE- 250: Performed by: FAMILY MEDICINE

## 2022-04-01 PROCEDURE — 74011250636 HC RX REV CODE- 250/636: Performed by: FAMILY MEDICINE

## 2022-04-01 PROCEDURE — 85025 COMPLETE CBC W/AUTO DIFF WBC: CPT

## 2022-04-01 RX ORDER — TRAZODONE HYDROCHLORIDE 50 MG/1
50 TABLET ORAL
Status: DISCONTINUED | OUTPATIENT
Start: 2022-04-01 | End: 2022-04-07

## 2022-04-01 RX ADMIN — Medication: at 16:42

## 2022-04-01 RX ADMIN — LORAZEPAM 0.5 MG: 0.5 TABLET ORAL at 12:09

## 2022-04-01 RX ADMIN — LORAZEPAM 0.5 MG: 0.5 TABLET ORAL at 18:16

## 2022-04-01 RX ADMIN — TRAZODONE HYDROCHLORIDE 50 MG: 50 TABLET ORAL at 21:25

## 2022-04-01 RX ADMIN — SODIUM CHLORIDE, PRESERVATIVE FREE 10 ML: 5 INJECTION INTRAVENOUS at 05:27

## 2022-04-01 RX ADMIN — SODIUM CHLORIDE, PRESERVATIVE FREE 10 ML: 5 INJECTION INTRAVENOUS at 15:23

## 2022-04-01 RX ADMIN — SODIUM CHLORIDE, PRESERVATIVE FREE 10 ML: 5 INJECTION INTRAVENOUS at 21:27

## 2022-04-01 RX ADMIN — ENOXAPARIN SODIUM 40 MG: 100 INJECTION SUBCUTANEOUS at 08:35

## 2022-04-01 NOTE — PROGRESS NOTES
TRANSFER - IN REPORT:    Verbal report received from Prince on Benjiman Clock  being received from ED for routine progression of care      Report consisted of patients Situation, Background, Assessment and   Recommendations(SBAR). Information from the following report(s) Kardex was reviewed with the receiving nurse. Opportunity for questions and clarification was provided. Assessment completed upon patients arrival to unit and care assumed.

## 2022-04-01 NOTE — ROUTINE PROCESS
TRANSFER - OUT REPORT:    Verbal report given to RN(name) on Ita Foster  being transferred to 8th Floor(unit) for routine progression of care       Report consisted of patients Situation, Background, Assessment and   Recommendations(SBAR). Information from the following report(s) SBAR, MAR and Recent Results was reviewed with the receiving nurse. Lines:   Peripheral IV 03/28/22 Anterior;Proximal;Right Forearm (Active)   Site Assessment Clean, dry, & intact 03/31/22 2045   Phlebitis Assessment 0 03/31/22 2045   Infiltration Assessment 0 03/31/22 2045   Dressing Status Clean, dry, & intact 03/31/22 2045   Dressing Type Tape;Transparent 03/31/22 2045   Hub Color/Line Status Patent; Flushed 03/31/22 2045   Alcohol Cap Used No 03/31/22 1530        Opportunity for questions and clarification was provided.

## 2022-04-01 NOTE — PROGRESS NOTES
Hospitalist Progress Note   Admit Date:  3/28/2022  6:49 PM   Name:  Bonnie Cherry   Age:  46 y.o. Sex:  female  :  1970   MRN:  [id number]   Room:  801/01    Presenting Complaint: Drug Overdose and Mental Health Problem    Reason(s) for Admission: Intentional lithium overdose [facility] Course & Interval History:     Bonnie Cherry is a 46 y.o. female with medical history of bipolar disorder admitted with reported intentional overdose of lithium. Patient apparently took an unknown amount of lithium and \"something else\" in an attempt to commit suicide. She has her Lithium bottle with her. The quality dispensed was 120, and there are currently 105 pills counted by ER nurse. UDS is positive for amphetamines and THC. Started on Fluid resuscitation. Subjective/24hr Events (22): Patient irritated. Wants to go home. Wants to have her cell phone (which is apparently not allowed in SI precautions). ROS:  10 systems reviewed and negative except as noted above. Assessment & Plan:     Intentional lithium overdose:  Reportedly suicidal  Psychiatry consultation pending  Patient is on commitment paper, need sitter  Currently somnolent but arousable  Lithium level trending up, 2.1 this morning. Will continue to trend  Nephrology consult  Continue fluid resuscitation  Monitor renal function and respiratory status  Lithium level down to 1. IV fluids discontinued. Nephrology signed off. Psych consulted yesterday and recommend admit inpatient psych and involuntary psych hold. Case management consulted. Pending placement     -labs stable. Awaiting placement.  Remains on IVC papers     Substance abuse:  UDS positive for amphetamine and THC  Counseled    Bipolar disorder:  Holding home meds for time being given intention overdose of lithium with elevated lithium level  Psychiatry consulted due to suicide attempt     - Restart Trazodone       Discharge Planning: Inpatient psych and involuntary psych hold. Case management consulted    Diet:  ADULT DIET Regular  DVT PPx: SCD  Code status: Full Code    Hospital Problems as of 4/1/2022 Never Reviewed          Codes Class Noted - Resolved POA    * (Principal) Intentional lithium overdose ([facility].) ICD-10-CM: K49.612V  ICD-9-CM: 985.8, E950.9  3/29/2022 - Present Unknown        Bipolar 1 disorder, depressed, severe ([facility].) ICD-10-CM: F31.4  ICD-9-CM: 296.53  11/27/2020 - Present Yes        History of methamphetamine abuse ([facility] ICD-10-CM: F15.11  ICD-9-CM: 305.73  1/26/2018 - Present Yes              Objective:     Patient Vitals for the past 24 hrs:   Temp Pulse Resp BP SpO2   04/01/22 1458 98 °F (36.7 °C) 78 18 132/73 98 %   04/01/22 1048 97.9 °F (36.6 °C) 79 18 119/80 97 %   04/01/22 0715 98 °F (36.7 °C) 70 16 137/69 98 %   04/01/22 0300 98.2 °F (36.8 °C) 65 18 113/61 97 %   04/01/22 0019 98.2 °F (36.8 °C) 75 18 128/79 98 %   03/31/22 2005 98.2 °F (36.8 °C) 70 18 109/75 98 %     Oxygen Therapy  O2 Sat (%): 98 % (04/01/22 1458)  Pulse via Oximetry: 93 beats per minute (03/29/22 0329)  O2 Device: None (Room air) (04/01/22 0715)    Estimated body mass index is 21.47 kg/m² as calculated from the following:    Height as of this encounter: 5' 6\" (1.676 m). Weight as of this encounter: 60.3 kg (133 lb). Intake/Output Summary (Last 24 hours) at 4/1/2022 1628  Last data filed at 4/1/2022 0956  Gross per 24 hour   Intake 640 ml   Output --   Net 640 ml         Physical Exam:     Blood pressure 132/73, pulse 78, temperature 98 °F (36.7 °C), resp. rate 18, height 5' 6\" (1.676 m), weight 60.3 kg (133 lb), SpO2 98 %. General:    Awake, alert and agitated  Head:  Normocephalic, atraumatic  Eyes:  Sclerae appear normal.  Pupils equally round. ENT:  Nares appear normal, no drainage. Moist oral mucosa  Neck:  No restricted ROM. Trachea midline   CV:   RRR. No m/r/g. No jugular venous distension. Lungs:   CTAB.   No wheezing, rhonchi, or rales.  Respirations even, unlabored  Abdomen: Bowel sounds present. Soft, nontender, nondistended. Extremities: No cyanosis or clubbing. No edema  Skin:     No rashes and normal coloration. Warm and dry. Neuro:  CN II-XII grossly intact. Sensation intact. A&Ox3  Psych:  agitated    I have reviewed ordered lab tests and independently visualized imaging below:    Recent Labs:  Recent Results (from the past 48 hour(s))   METABOLIC PANEL, BASIC    Collection Time: 03/31/22  6:54 AM   Result Value Ref Range    Sodium 145 136 - 145 mmol/L    Potassium 3.8 3.5 - 5.1 mmol/L    Chloride 114 (H) 98 - 107 mmol/L    CO2 23 21 - 32 mmol/L    Anion gap 8 7 - 16 mmol/L    Glucose 91 65 - 100 mg/dL    BUN 8 6 - 23 MG/DL    Creatinine 0.60 0.6 - 1.0 MG/DL    GFR est AA >60 >60 ml/min/1.73m2    GFR est non-AA >60 >60 ml/min/1.73m2    Calcium 9.0 8.3 - 10.4 MG/DL   CBC WITH AUTOMATED DIFF    Collection Time: 03/31/22  6:54 AM   Result Value Ref Range    WBC 7.6 4.3 - 11.1 K/uL    RBC 4.25 4.05 - 5.2 M/uL    HGB 12.5 11.7 - 15.4 g/dL    HCT 38.7 35.8 - 46.3 %    MCV 91.1 79.6 - 97.8 FL    MCH 29.4 26.1 - 32.9 PG    MCHC 32.3 31.4 - 35.0 g/dL    RDW 13.0 11.9 - 14.6 %    PLATELET 184 966 - 721 K/uL    MPV 10.9 9.4 - 12.3 FL    ABSOLUTE NRBC 0.00 0.0 - 0.2 K/uL    DF AUTOMATED      NEUTROPHILS 49 43 - 78 %    LYMPHOCYTES 38 13 - 44 %    MONOCYTES 8 4.0 - 12.0 %    EOSINOPHILS 4 0.5 - 7.8 %    BASOPHILS 1 0.0 - 2.0 %    IMMATURE GRANULOCYTES 0 0.0 - 5.0 %    ABS. NEUTROPHILS 3.7 1.7 - 8.2 K/UL    ABS. LYMPHOCYTES 2.9 0.5 - 4.6 K/UL    ABS. MONOCYTES 0.6 0.1 - 1.3 K/UL    ABS. EOSINOPHILS 0.3 0.0 - 0.8 K/UL    ABS. BASOPHILS 0.1 0.0 - 0.2 K/UL    ABS. IMM.  GRANS. 0.0 0.0 - 0.5 K/UL   METABOLIC PANEL, BASIC    Collection Time: 04/01/22  3:12 AM   Result Value Ref Range    Sodium 142 136 - 145 mmol/L    Potassium 3.9 3.5 - 5.1 mmol/L    Chloride 110 (H) 98 - 107 mmol/L    CO2 25 21 - 32 mmol/L    Anion gap 7 7 - 16 mmol/L Glucose 91 65 - 100 mg/dL    BUN 15 6 - 23 MG/DL    Creatinine 0.60 0.6 - 1.0 MG/DL    GFR est AA >60 >60 ml/min/1.73m2    GFR est non-AA >60 >60 ml/min/1.73m2    Calcium 9.8 8.3 - 10.4 MG/DL   CBC WITH AUTOMATED DIFF    Collection Time: 04/01/22  3:12 AM   Result Value Ref Range    WBC 9.2 4.3 - 11.1 K/uL    RBC 4.48 4.05 - 5.2 M/uL    HGB 13.0 11.7 - 15.4 g/dL    HCT 39.6 35.8 - 46.3 %    MCV 88.4 79.6 - 97.8 FL    MCH 29.0 26.1 - 32.9 PG    MCHC 32.8 31.4 - 35.0 g/dL    RDW 12.9 11.9 - 14.6 %    PLATELET 481 627 - 940 K/uL    MPV 11.3 9.4 - 12.3 FL    ABSOLUTE NRBC 0.00 0.0 - 0.2 K/uL    DF AUTOMATED      NEUTROPHILS 53 43 - 78 %    LYMPHOCYTES 36 13 - 44 %    MONOCYTES 8 4.0 - 12.0 %    EOSINOPHILS 3 0.5 - 7.8 %    BASOPHILS 1 0.0 - 2.0 %    IMMATURE GRANULOCYTES 0 0.0 - 5.0 %    ABS. NEUTROPHILS 4.9 1.7 - 8.2 K/UL    ABS. LYMPHOCYTES 3.3 0.5 - 4.6 K/UL    ABS. MONOCYTES 0.7 0.1 - 1.3 K/UL    ABS. EOSINOPHILS 0.3 0.0 - 0.8 K/UL    ABS. BASOPHILS 0.1 0.0 - 0.2 K/UL    ABS. IMM. GRANS. 0.0 0.0 - 0.5 K/UL       All Micro Results     None          Other Studies:  No results found. Current Meds:  Current Facility-Administered Medications   Medication Dose Route Frequency    pantothenic ac-min oil-pet,hyd (AQUAPHOR) 41 % ointment   Topical PRN    nicotine (NICODERM CQ) 14 mg/24 hr patch 1 Patch  1 Patch TransDERmal Q24H    LORazepam (ATIVAN) tablet 0.5 mg  0.5 mg Oral Q6H PRN    sodium chloride (NS) flush 5-40 mL  5-40 mL IntraVENous Q8H    sodium chloride (NS) flush 5-40 mL  5-40 mL IntraVENous PRN    ondansetron (ZOFRAN ODT) tablet 4 mg  4 mg Oral Q8H PRN    Or    ondansetron (ZOFRAN) injection 4 mg  4 mg IntraVENous Q6H PRN    enoxaparin (LOVENOX) injection 40 mg  40 mg SubCUTAneous DAILY       Signed:  Nancy Salvador DO    Part of this note may have been written by using a voice dictation software. The note has been proof read but may still contain some grammatical/other typographical errors.

## 2022-04-01 NOTE — PROGRESS NOTES
Wrapped patient's right lower leg dog bite scab with aquaphor, 4x4, and koban. Patient states in a yelling voice there's no reason to ask if she can do anything else, because I follow the rules.

## 2022-04-01 NOTE — PROGRESS NOTES
Messaged Dr. Jose Dalal regarding some antibiotic cream from patient's leg and to ask Dr. Jose Dalal if she will come and speak with patient bc she is threatening to leave soon.

## 2022-04-01 NOTE — PROGRESS NOTES
Patient asked me to call boyfriend Gavin Mcconnell and check on him and the mother. Amanda Sanju says he's fine. I went over things if he decides to come up.

## 2022-04-01 NOTE — PROGRESS NOTES
Patient in room this Am sitting in bed with Dalia ambrose at bedside. Patient denies any pain or sob. Currently on room air. Patient had specific suicide watch questions such as if she could have any persona belongings - no, if she could have a visitor - 1 at a time, and other questions that I referred to Trina, Supervisor. Patient was calm.

## 2022-04-01 NOTE — PROGRESS NOTES
Reviewed notes for any spiritual concerns        will follow as needed      Expected discharge - 7 days    Suicide precautions      PREFERRED NAME -  Ricardo 59
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
## 2022-04-01 NOTE — PROGRESS NOTES
Patient requested ativan. She said she has asked for it multiple times, but I have been in her room multiple times and when I asked if she needs anything else, she responded with \"no\".

## 2022-04-01 NOTE — PROGRESS NOTES
Patient resting in room with sitter. Patient in no acute distress. Currently talking on the phone provided by Gregorio Aldana (the Director). Phone to be removed from the room when patient is not on it.

## 2022-04-01 NOTE — PROGRESS NOTES
Problem: Suicide  Goal: *STG: Remains safe in hospital  Outcome: Progressing Towards Goal     Patient has remained safe in her room, with environmental checks Q1, sitter at bedside. Patient received 1 dose of ativan during the day. Patient has had emotional outbursts throughout the day. We offered to place the phone in the patient's room while she talked on it, but patient declined.

## 2022-04-01 NOTE — PROGRESS NOTES
Patient resting in bed, calm and cooperative with care. Sitter at patient room. Patient on room air. Left lower leg has healing areas from a dog bite. Safety measures in place, call light within reach.

## 2022-04-02 PROCEDURE — 65270000029 HC RM PRIVATE

## 2022-04-02 PROCEDURE — 74011250637 HC RX REV CODE- 250/637: Performed by: FAMILY MEDICINE

## 2022-04-02 PROCEDURE — 74011250637 HC RX REV CODE- 250/637: Performed by: INTERNAL MEDICINE

## 2022-04-02 PROCEDURE — 74011000250 HC RX REV CODE- 250: Performed by: FAMILY MEDICINE

## 2022-04-02 PROCEDURE — 74011250636 HC RX REV CODE- 250/636: Performed by: FAMILY MEDICINE

## 2022-04-02 RX ORDER — LORAZEPAM 1 MG/1
1 TABLET ORAL
Status: DISCONTINUED | OUTPATIENT
Start: 2022-04-02 | End: 2022-04-04

## 2022-04-02 RX ADMIN — SODIUM CHLORIDE, PRESERVATIVE FREE 10 ML: 5 INJECTION INTRAVENOUS at 05:44

## 2022-04-02 RX ADMIN — LORAZEPAM 0.5 MG: 0.5 TABLET ORAL at 00:28

## 2022-04-02 RX ADMIN — Medication: at 21:59

## 2022-04-02 RX ADMIN — SODIUM CHLORIDE, PRESERVATIVE FREE 10 ML: 5 INJECTION INTRAVENOUS at 15:32

## 2022-04-02 RX ADMIN — Medication: at 04:10

## 2022-04-02 RX ADMIN — TRAZODONE HYDROCHLORIDE 50 MG: 50 TABLET ORAL at 21:52

## 2022-04-02 RX ADMIN — LORAZEPAM 0.5 MG: 0.5 TABLET ORAL at 09:36

## 2022-04-02 RX ADMIN — ENOXAPARIN SODIUM 40 MG: 100 INJECTION SUBCUTANEOUS at 09:29

## 2022-04-02 RX ADMIN — SODIUM CHLORIDE, PRESERVATIVE FREE 10 ML: 5 INJECTION INTRAVENOUS at 21:53

## 2022-04-02 NOTE — PROGRESS NOTES
Patient states she would like an ativan. She also would like me to ask Dr. Juanito Frederick if she can get anything else for anxiety because she states she doesn't remember dr. Juanito Frederick coming by and speaking to her approx 30 min ago. Will follow through.

## 2022-04-02 NOTE — PROGRESS NOTES
Assumed pt care. Pt in bed awake, alert and oriented. Pt seems calm. Pt denies pain or need at this time. No apparent distress noted. Call light in reach. Sitter at bedside. Suicide precautions in place.

## 2022-04-02 NOTE — PROGRESS NOTES
Hospitalist Progress Note   Admit Date:  3/28/2022  6:49 PM   Name:  Lena Ramon   Age:  46 y.o. Sex:  female  :  1970   MRN:  [id number]   Room:  [facility]/    Presenting Complaint: Drug Overdose and Mental Health Problem    Reason(s) for Admission: Intentional lithium overdose [facility] & HEALTH CARE SERVICES Course & Interval History:     Lena Ramon is a 46 y.o. female with medical history of bipolar disorder admitted with reported intentional overdose of lithium. Patient apparently took an unknown amount of lithium and \"something else\" in an attempt to commit suicide. She has her Lithium bottle with her. The quality dispensed was 120, and there are currently 105 pills counted by ER nurse. UDS is positive for amphetamines and THC. Started on Fluid resuscitation. Subjective/24hr Events (22): Pt had a bad night. Was allowed to use a room phone and called her boyfriend and was reported to be very agitated during and after call. This continued to this morning. She is asking for more ativan. ROS:  10 systems reviewed and negative except as noted above. Assessment & Plan:     Intentional lithium overdose:  Reportedly suicidal  Psychiatry consultation pending  Patient is on commitment paper, need sitter  Currently somnolent but arousable  Lithium level trending up, 2.1 this morning. Will continue to trend  Nephrology consult  Continue fluid resuscitation  Monitor renal function and respiratory status  Lithium level down to 1. IV fluids discontinued. Nephrology signed off. Psych consulted yesterday and recommend admit inpatient psych and involuntary psych hold. Case management consulted. Pending placement    / -labs stable. Awaiting placement. Remains on IVC papers   /2 - Anxiety uncontrolled. Increase prn ativan.      Substance abuse:  UDS positive for amphetamine and THC  Counseled    Bipolar disorder:  Holding home meds for time being given intention overdose of lithium with elevated lithium level  Psychiatry consulted due to suicide attempt    4/2 - trazadone qhs, prn ativan. Holding furhter lithium      Discharge Planning: Inpatient psych and involuntary psych hold. Case management consulted    Diet:  ADULT DIET Regular  DVT PPx: SCD  Code status: Full Code    Hospital Problems as of 4/2/2022 Never Reviewed          Codes Class Noted - Resolved POA    * (Principal) Intentional lithium overdose ([facility] ICD-10-CM: A04.607A  ICD-9-CM: 985.8, E950.9  3/29/2022 - Present Unknown        Bipolar 1 disorder, depressed, severe ([facility] ICD-10-CM: F31.4  ICD-9-CM: 296.53  11/27/2020 - Present Yes        History of methamphetamine abuse ([facility] ICD-10-CM: F15.11  ICD-9-CM: 305.73  1/26/2018 - Present Yes              Objective:     Patient Vitals for the past 24 hrs:   Temp Pulse Resp BP SpO2   04/02/22 1122 97.8 °F (36.6 °C) 100 16 (!) 113/98 95 %   04/02/22 0913 97.6 °F (36.4 °C) 93 16 124/78 100 %   04/02/22 0334 97.4 °F (36.3 °C) 70 18 (!) 109/59 98 %   04/01/22 2345 -- 95 19 (!) 147/74 99 %   04/01/22 1925 97.9 °F (36.6 °C) (!) 103 25 (!) 116/95 100 %   04/01/22 1458 98 °F (36.7 °C) 78 18 132/73 98 %     Oxygen Therapy  O2 Sat (%): 95 % (04/02/22 1122)  Pulse via Oximetry: 93 beats per minute (03/29/22 0329)  O2 Device: None (Room air) (04/02/22 0720)    Estimated body mass index is 22.42 kg/m² as calculated from the following:    Height as of this encounter: 5' 6\" (1.676 m). Weight as of this encounter: 63 kg (138 lb 14.2 oz). Intake/Output Summary (Last 24 hours) at 4/2/2022 1448  Last data filed at 4/2/2022 0830  Gross per 24 hour   Intake 480 ml   Output --   Net 480 ml         Physical Exam:     Blood pressure (!) 113/98, pulse 100, temperature 97.8 °F (36.6 °C), resp. rate 16, height 5' 6\" (1.676 m), weight 63 kg (138 lb 14.2 oz), SpO2 95 %.   General:    Awake, alert and agitated, mildly tremulous  Head:  Normocephalic, atraumatic  Eyes:  Sclerae appear normal.  Pupils equally round. ENT:  Nares appear normal, no drainage. Moist oral mucosa  Neck:  No restricted ROM. Trachea midline   CV:   RRR. No m/r/g. No jugular venous distension. Lungs:   CTAB. No wheezing, rhonchi, or rales. Respirations even, unlabored  Abdomen: Bowel sounds present. Soft, nontender, nondistended. Extremities: No cyanosis or clubbing. No edema  Skin:     No rashes and normal coloration. Warm and dry. Neuro:  CN II-XII grossly intact. Sensation intact. A&Ox3  Psych:  agitated    I have reviewed ordered lab tests and independently visualized imaging below:    Recent Labs:  Recent Results (from the past 48 hour(s))   METABOLIC PANEL, BASIC    Collection Time: 04/01/22  3:12 AM   Result Value Ref Range    Sodium 142 136 - 145 mmol/L    Potassium 3.9 3.5 - 5.1 mmol/L    Chloride 110 (H) 98 - 107 mmol/L    CO2 25 21 - 32 mmol/L    Anion gap 7 7 - 16 mmol/L    Glucose 91 65 - 100 mg/dL    BUN 15 6 - 23 MG/DL    Creatinine 0.60 0.6 - 1.0 MG/DL    GFR est AA >60 >60 ml/min/1.73m2    GFR est non-AA >60 >60 ml/min/1.73m2    Calcium 9.8 8.3 - 10.4 MG/DL   CBC WITH AUTOMATED DIFF    Collection Time: 04/01/22  3:12 AM   Result Value Ref Range    WBC 9.2 4.3 - 11.1 K/uL    RBC 4.48 4.05 - 5.2 M/uL    HGB 13.0 11.7 - 15.4 g/dL    HCT 39.6 35.8 - 46.3 %    MCV 88.4 79.6 - 97.8 FL    MCH 29.0 26.1 - 32.9 PG    MCHC 32.8 31.4 - 35.0 g/dL    RDW 12.9 11.9 - 14.6 %    PLATELET 393 663 - 392 K/uL    MPV 11.3 9.4 - 12.3 FL    ABSOLUTE NRBC 0.00 0.0 - 0.2 K/uL    DF AUTOMATED      NEUTROPHILS 53 43 - 78 %    LYMPHOCYTES 36 13 - 44 %    MONOCYTES 8 4.0 - 12.0 %    EOSINOPHILS 3 0.5 - 7.8 %    BASOPHILS 1 0.0 - 2.0 %    IMMATURE GRANULOCYTES 0 0.0 - 5.0 %    ABS. NEUTROPHILS 4.9 1.7 - 8.2 K/UL    ABS. LYMPHOCYTES 3.3 0.5 - 4.6 K/UL    ABS. MONOCYTES 0.7 0.1 - 1.3 K/UL    ABS. EOSINOPHILS 0.3 0.0 - 0.8 K/UL    ABS. BASOPHILS 0.1 0.0 - 0.2 K/UL    ABS. IMM.  GRANS. 0.0 0.0 - 0.5 K/UL       All Micro Results     None Other Studies:  No results found. Current Meds:  Current Facility-Administered Medications   Medication Dose Route Frequency    LORazepam (ATIVAN) tablet 1 mg  1 mg Oral Q6H PRN    pantothenic ac-min oil-pet,hyd (AQUAPHOR) 41 % ointment   Topical PRN    traZODone (DESYREL) tablet 50 mg  50 mg Oral QHS    nicotine (NICODERM CQ) 14 mg/24 hr patch 1 Patch  1 Patch TransDERmal Q24H    sodium chloride (NS) flush 5-40 mL  5-40 mL IntraVENous Q8H    sodium chloride (NS) flush 5-40 mL  5-40 mL IntraVENous PRN    ondansetron (ZOFRAN ODT) tablet 4 mg  4 mg Oral Q8H PRN    Or    ondansetron (ZOFRAN) injection 4 mg  4 mg IntraVENous Q6H PRN    enoxaparin (LOVENOX) injection 40 mg  40 mg SubCUTAneous DAILY       Signed:  Nara Veras DO    Part of this note may have been written by using a voice dictation software. The note has been proof read but may still contain some grammatical/other typographical errors.

## 2022-04-02 NOTE — PROGRESS NOTES
Assumed pt care. Pt seems anxious in the room talking on the phone. Suicidal precautions in place, sitter at bedside. Pt alert and oriented x4, denies pain or need at this time. Cord phone removed from the room. Pt upset but states she understands.

## 2022-04-02 NOTE — PROGRESS NOTES
Pt in bed sleeping with respirations present, even and unlabored. No apparent distress noted at this time. Call light at bedside. Sitter at bedside. Suicide precautions in place. Preparing report to oncoming RN.

## 2022-04-02 NOTE — PROGRESS NOTES
Patient up and down out of bed. PATTY Cloud as sitter. Patient currently on corded phone, and then we will remove. No other needs voiced. Will monitor until shift report.

## 2022-04-02 NOTE — PROGRESS NOTES
Problem: Suicide  Goal: *STG: Remains safe in hospital  Outcome: Progressing Towards Goal     Patient has voiced multiple times that she is not \"complying\" with our rules. Patient changed her clothing back to her clothing that she originally came in to the hospital, and told me she absolutely would not change back. Patient states that she would \"not comply for her last 3 days here at the hospital\".

## 2022-04-02 NOTE — PROGRESS NOTES
Patient put her own clothes on. I went in to tell her to change and she abruptly told me me \"no she wouldn't\".

## 2022-04-02 NOTE — PROGRESS NOTES
Patient attempted to have the  call her family and ask for them to bring up personal belongings. I explained that it's the same exact thing that multiple people spoken with the patient about yesterday and even today. She can not have any personal belongings brought to her. Patient used multiple curse words towards me and informed me that she would just be \"non compliant\" for the next 3 days.

## 2022-04-02 NOTE — PROGRESS NOTES
For safety purposes and protocol, attempted to remove call light cord from room but cord it screwed into the wall. Unable to remove it. Sitter in the room and aware. Will monitor.

## 2022-04-02 NOTE — PROGRESS NOTES
Assumed care of patient. Patient sleeping in bed. Easy to awake. Patient states she had a bad dream. She doesn't state any details. Denies any pain. No other needs voiced. Ann Calero CNA at bedside. Suicide precautions in place. Patient's environment safe. Will continue to monitor hourly.

## 2022-04-03 PROCEDURE — 74011000250 HC RX REV CODE- 250: Performed by: FAMILY MEDICINE

## 2022-04-03 PROCEDURE — 65270000029 HC RM PRIVATE

## 2022-04-03 PROCEDURE — 74011250637 HC RX REV CODE- 250/637: Performed by: FAMILY MEDICINE

## 2022-04-03 PROCEDURE — 74011250637 HC RX REV CODE- 250/637: Performed by: INTERNAL MEDICINE

## 2022-04-03 PROCEDURE — 74011250636 HC RX REV CODE- 250/636: Performed by: FAMILY MEDICINE

## 2022-04-03 RX ADMIN — SODIUM CHLORIDE, PRESERVATIVE FREE 10 ML: 5 INJECTION INTRAVENOUS at 22:42

## 2022-04-03 RX ADMIN — SODIUM CHLORIDE, PRESERVATIVE FREE 10 ML: 5 INJECTION INTRAVENOUS at 05:26

## 2022-04-03 RX ADMIN — LORAZEPAM 1 MG: 1 TABLET ORAL at 12:05

## 2022-04-03 RX ADMIN — LORAZEPAM 1 MG: 1 TABLET ORAL at 00:55

## 2022-04-03 RX ADMIN — LORAZEPAM 1 MG: 1 TABLET ORAL at 18:12

## 2022-04-03 RX ADMIN — SODIUM CHLORIDE, PRESERVATIVE FREE 10 ML: 5 INJECTION INTRAVENOUS at 14:24

## 2022-04-03 RX ADMIN — ENOXAPARIN SODIUM 40 MG: 100 INJECTION SUBCUTANEOUS at 10:08

## 2022-04-03 RX ADMIN — TRAZODONE HYDROCHLORIDE 50 MG: 50 TABLET ORAL at 22:42

## 2022-04-03 NOTE — PROGRESS NOTES
Patient currently on commitment papers. Commitment papers will be updated on 4/4/22. CM will continue to monitor.

## 2022-04-03 NOTE — PROGRESS NOTES
Bedside report received from night nurse Caren. Assessment done as noted  Respiration even and unlabored 20/min; denies pain or nausea at present. Sitter remains at bedside. Encouraged to call with needs.

## 2022-04-03 NOTE — PROGRESS NOTES
Assumed pt care . Pt in bed sleeping with even and unlabored respirations. No s/s of distress noted. Suicide precautions still in place. Pt refuses to wear paper scrubs. Pt states she feels more comfortable on her own clothes. Personal clothes were verified and consist of sweat pants and tshirt with no zippers, buttons and/or string ties. Sitter a bedside. Will monitor. Constant Observer Yes - Name: [facility] - nursing assistant   Constant Observer Oriented YES   High risk patients are in line of sight at all times Yes   Excess equipment/medical supplies not necessary for the care of the patient removed Yes   All sharp or dangerous objects are removed from room: including but not limited to belts, pens & pencils, needles, medications, cosmetics, lighters, matches, nail files, watches, necklaces, glass objects, razors, razor blades, knives, aerosol sprays, drawstring pants, shoes, cords (telephone, call bells, etc.) cleaning wipes or other cleaning items, aluminum cans, not permanently attached wall décor Yes   Telephone/cell phone removed as well as TV remote (batteries can be swallowed) Yes   Patient belongings removed and labeled at nurses station Yes   Excess linen is removed from room Yes   All plastic bags are removed from the room and replaced with paper trash bags Yes   Patient is in paper scrubs or appropriate gown and using hospital socks with rubber soles NO - pt refuses wearing paper scrubs. pt states she feels more comfortable on her own clothes. Personal clothes were verified and consist of sweat pants and tshirt with no zippers, buttons and/or string ties. No metal, hard eating utensils or hard plates are on meal tray Yes   Remove all cleaning agents used by [employer] Yes   If Crucifix is hanging on a nail, remove Crucifix as well as the nail Yes       *If any question above is answered \"No,\" documentation is required.

## 2022-04-03 NOTE — PROGRESS NOTES
Reviewed notes prior to visit    Patient is calm    Awake and alert    RN Dirk Duverney at the door        Preferred name -  Jacquelin      Patient was very receptive to  presence and invited him in    She was very engaging in her conversation as she talked openly about her life, struggles, family and yodit. Some points:    Admits to being a drug addict    She qualified by saying \" she doesn't steal money to get drugs like other people\"      Her and her  maxwell are homeless. Their sons are homeless  One son is in long term scheduled to get out in august.  Her mother was homeless but has purchased  A mobile home. All the family members that are homeless are not together they are scattered about. Patient says she has PTSD and is [place] triggered\"    She zelaya not like the numbers 3 and 5. Colors also set her off. In her conversation you can tell she is familiar with the bible. Said her  is more committed to the Donya Moores and \"she doesn't do as good\"      Used this visit as a chance to build rapport with patient. Guidance points based on what she told me:    * God knows all and sees all - we don't have to give him a laundry list    * be more silent in his presence/ prayer -  Let Him speak to us    * there is a reason God has you here - may not be all physical needs    * take advantage of this time to re-commit yourself to God before you are discharged. * let God decide on what He wants you to have or lose    * pray for protection against the demons inside (voiced)  and the bad social influences in the world (gangs/drugs)       joined with Shirley Chan as we prayed with the patient. Will follow closely as the patient remains in our care.

## 2022-04-03 NOTE — PROGRESS NOTES
Resting quietly at present. NAD noted. Safety maintained through out the shift. Pt aware per La Rogers, Nursing director, pt cannot have cell phone, or any personal items, gordon go outside, can have one visitor at a time with sitter present with pt and visitor during the time of visit, aware that pt cannot have anything brought from outside and can use hospital phone to make a phone call (phone to be removed from the room once done with making a phone call. Pt verbalizes understanding. Sitter remains at bedside. To report off to on coming nurse.

## 2022-04-03 NOTE — PROGRESS NOTES
Pt took a shower and refuses to wear blue scrubs. Pt has been wearing clothes she has when she came into the hospital. Clothes consist of sweat pants and a T-shirt. There are no zippers, buttons or string ties. Pt was educated on the need to wear blue scrubs. Pt still refuses stating she feels more comfortable wearing her clothes. Sitter at bedside. Will monitor.
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
## 2022-04-03 NOTE — PROGRESS NOTES
Constant Observer Yes - Name: Bill Packer RN   Constant Observer Oriented YES   High risk patients are in line of sight at all times Yes   Excess equipment/medical supplies not necessary for the care of the patient removed Yes   All sharp or dangerous objects are removed from room: including but not limited to belts, pens & pencils, needles, medications, cosmetics, lighters, matches, nail files, watches, necklaces, glass objects, razors, razor blades, knives, aerosol sprays, drawstring pants, shoes, cords (telephone, call bells, etc.) cleaning wipes or other cleaning items, aluminum cans, not permanently attached wall décor Yes   Telephone/cell phone removed as well as TV remote (batteries can be swallowed) Yes   Patient belongings removed and labeled at nurses station Yes   Excess linen is removed from room Yes   All plastic bags are removed from the room and replaced with paper trash bags Yes   Patient is in paper scrubs or appropriate gown and using hospital socks with rubber soles Yes   No metal, hard eating utensils or hard plates are on meal tray Yes   Remove all cleaning agents used by [employer] Yes   If Crucifix is hanging on a nail, remove Crucifix as well as the nail Yes       *If any question above is answered \"No,\" documentation is required.

## 2022-04-03 NOTE — PROGRESS NOTES
Pt asked to use a phone. Basil Mcclelland, Nursing director pt is allowed to use hospital phone to make a phone call (phone to be removed from the room once done with making a phone call with sitter supervision. Nurse assisted pt to make a call. Pt aware that phone will be removed once she is through making the call. Verbalizes understanding. Sitter remains at bedside. [phone] \"done with the phone\". phone removed from pt's room with the cord. Sitter remains at bedside.

## 2022-04-03 NOTE — PROGRESS NOTES
Pt in bed sleeping. No apparent distress noted. Call light in reach. Sitter at bedside. Pt on room air with even and unlabored respirations noted. Preparing to report to oncoming RN.

## 2022-04-04 PROCEDURE — 94760 N-INVAS EAR/PLS OXIMETRY 1: CPT

## 2022-04-04 PROCEDURE — 74011000250 HC RX REV CODE- 250: Performed by: FAMILY MEDICINE

## 2022-04-04 PROCEDURE — 99233 SBSQ HOSP IP/OBS HIGH 50: CPT | Performed by: NURSE PRACTITIONER

## 2022-04-04 PROCEDURE — 74011250637 HC RX REV CODE- 250/637: Performed by: INTERNAL MEDICINE

## 2022-04-04 PROCEDURE — 74011250636 HC RX REV CODE- 250/636: Performed by: FAMILY MEDICINE

## 2022-04-04 PROCEDURE — 65270000029 HC RM PRIVATE

## 2022-04-04 PROCEDURE — 74011250637 HC RX REV CODE- 250/637: Performed by: FAMILY MEDICINE

## 2022-04-04 RX ORDER — OLANZAPINE 5 MG/1
5 TABLET, ORALLY DISINTEGRATING ORAL DAILY
Status: DISCONTINUED | OUTPATIENT
Start: 2022-04-04 | End: 2022-04-05

## 2022-04-04 RX ORDER — LORAZEPAM 1 MG/1
1 TABLET ORAL
Status: DISCONTINUED | OUTPATIENT
Start: 2022-04-04 | End: 2022-04-19 | Stop reason: HOSPADM

## 2022-04-04 RX ORDER — OLANZAPINE 5 MG/1
5 TABLET, ORALLY DISINTEGRATING ORAL DAILY
Status: DISCONTINUED | OUTPATIENT
Start: 2022-04-05 | End: 2022-04-04

## 2022-04-04 RX ADMIN — LORAZEPAM 1 MG: 1 TABLET ORAL at 21:58

## 2022-04-04 RX ADMIN — SODIUM CHLORIDE, PRESERVATIVE FREE 10 ML: 5 INJECTION INTRAVENOUS at 21:15

## 2022-04-04 RX ADMIN — LORAZEPAM 1 MG: 1 TABLET ORAL at 17:51

## 2022-04-04 RX ADMIN — SODIUM CHLORIDE, PRESERVATIVE FREE 10 ML: 5 INJECTION INTRAVENOUS at 06:07

## 2022-04-04 RX ADMIN — TRAZODONE HYDROCHLORIDE 50 MG: 50 TABLET ORAL at 21:58

## 2022-04-04 RX ADMIN — SODIUM CHLORIDE, PRESERVATIVE FREE 10 ML: 5 INJECTION INTRAVENOUS at 17:44

## 2022-04-04 RX ADMIN — LORAZEPAM 1 MG: 1 TABLET ORAL at 09:28

## 2022-04-04 RX ADMIN — OLANZAPINE 5 MG: 5 TABLET, ORALLY DISINTEGRATING ORAL at 17:51

## 2022-04-04 RX ADMIN — ENOXAPARIN SODIUM 40 MG: 100 INJECTION SUBCUTANEOUS at 09:26

## 2022-04-04 NOTE — PROGRESS NOTES
Report received from off going nurse. Patient asleep in bed with no s/s of acute distress. Respirations regular and unlabored. Sitter at bedside. Will continue to monitor. Safety measures in place.

## 2022-04-04 NOTE — PROGRESS NOTES
Received call from [employer] regarding pt wanting to have numbers dialed for her. Switch  states \"pt called numerous time in just few minutes to have a number dial and was unable to have  to answer the call. Pt is upset and starts to yell at this  for not being able to get her family on the phone. \" on going yelling in the room for not being able to talk to family on the phone. Hospital phone removed with the cord from the room. Pt asked to speak to  and CM made aware. To continue to monitor. 1230 CM at bedside.

## 2022-04-04 NOTE — PROGRESS NOTES
Constant Observer Yes - Name: Stephenie Mcgovern   Constant Observer Oriented YES   High risk patients are in line of sight at all times Yes   Excess equipment/medical supplies not necessary for the care of the patient removed Yes   All sharp or dangerous objects are removed from room: including but not limited to belts, pens & pencils, needles, medications, cosmetics, lighters, matches, nail files, watches, necklaces, glass objects, razors, razor blades, knives, aerosol sprays, drawstring pants, shoes, cords (telephone, call bells, etc.) cleaning wipes or other cleaning items, aluminum cans, not permanently attached wall décor Yes   Telephone/cell phone removed as well as TV remote (batteries can be swallowed) Yes   Patient belongings removed and labeled at nurses station Yes   Excess linen is removed from room Yes   All plastic bags are removed from the room and replaced with paper trash bags Yes   Patient is in paper scrubs or appropriate gown and using hospital socks with rubber soles Yes   No metal, hard eating utensils or hard plates are on meal tray Yes   Remove all cleaning agents used by [employer] Yes   If Crucifix is hanging on a nail, remove Crucifix as well as the nail Yes       *If any question above is answered \"No,\" documentation is required.

## 2022-04-04 NOTE — PROGRESS NOTES
Pt sleeping in bed on room air with present, even and unlabored respirations. No apparent distress noted at this time. Sitter at bedside. Suicide precautions in place. Preparing report to oncoming RN. Constant Observer Yes - Name: May   Constant Observer Oriented YES   High risk patients are in line of sight at all times Yes   Excess equipment/medical supplies not necessary for the care of the patient removed Yes   All sharp or dangerous objects are removed from room: including but not limited to belts, pens & pencils, needles, medications, cosmetics, lighters, matches, nail files, watches, necklaces, glass objects, razors, razor blades, knives, aerosol sprays, drawstring pants, shoes, cords (telephone, call bells, etc.) cleaning wipes or other cleaning items, aluminum cans, not permanently attached wall décor Yes   Telephone/cell phone removed as well as TV remote (batteries can be swallowed) Yes   Patient belongings removed and labeled at nurses station Yes   Excess linen is removed from room Yes   All plastic bags are removed from the room and replaced with paper trash bags Yes   Patient is in paper scrubs or appropriate gown and using hospital socks with rubber soles Yes   No metal, hard eating utensils or hard plates are on meal tray Yes   Remove all cleaning agents used by [employer] Yes   If Crucifix is hanging on a nail, remove Crucifix as well as the nail Yes       *If any question above is answered \"No,\" documentation is required.
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
## 2022-04-04 NOTE — PROGRESS NOTES
Hospitalist Progress Note   Admit Date:  3/28/2022  6:49 PM   Name:  Trisha Cintron   Age:  46 y.o. Sex:  female  :  1970   MRN:  [id number]   Room:  [facility]/    Presenting Complaint: Drug Overdose and Mental Health Problem    Reason(s) for Admission: Intentional lithium overdose [facility] Course & Interval History:     Trisha Cintron is a 46 y.o. female with medical history of bipolar disorder admitted with reported intentional overdose of lithium. Patient apparently took an unknown amount of lithium and \"something else\" in an attempt to commit suicide. She has her Lithium bottle with her. The quality dispensed was 120, and there are currently 105 pills counted by ER nurse. UDS is positive for amphetamines and THC. Started on Fluid resuscitation. Subjective/24hr Events (22): Patient was speaking with psychiatry NP during first visit. When I came back she was on hold with someone. Denied pain, nausea, vomiting. ROS:  10 systems reviewed and negative except as noted above. Assessment & Plan:     Intentional lithium overdose:  Reportedly suicidal  Psychiatry consultation pending  Patient is on commitment paper, need sitter  Currently somnolent but arousable  Lithium level trending up, 2.1 this morning. Will continue to trend  Nephrology consult  Continue fluid resuscitation  Monitor renal function and respiratory status  Lithium level down to 1. IV fluids discontinued. Nephrology signed off.   4/3- continue current. Will need repeat psych eval in AM for paper renewal. She really needs IP placement for medication adjustment. This is not the appropriate setting for this.  - CM/pxych assisting in finding IP psych facility.      Substance abuse:  UDS positive for amphetamine and THC  Counseled    Bipolar disorder:  Holding home meds for time being given intention overdose of lithium with elevated lithium level  Psychiatry consulted due to suicide attempt    4/2 - trazadone qhs, prn ativan. Holding furhter lithium  4/4 - Pyschiatry recommeding addition of zyprexa 5mg daily. added      Discharge Planning: Inpatient psych and involuntary psych hold. Case management consulted    Diet:  ADULT DIET Regular  DVT PPx: SCD  Code status: Full Code    Hospital Problems as of 4/4/2022 Never Reviewed          Codes Class Noted - Resolved POA    * (Principal) Intentional lithium overdose ([facility] ICD-10-CM: L62.764U  ICD-9-CM: 985.8, E950.9  3/29/2022 - Present Unknown        Bipolar 1 disorder, depressed, severe ([facility] ICD-10-CM: F31.4  ICD-9-CM: 296.53  11/27/2020 - Present Yes        History of methamphetamine abuse ([facility] ICD-10-CM: F15.11  ICD-9-CM: 305.73  1/26/2018 - Present Yes              Objective:     Patient Vitals for the past 24 hrs:   Temp Pulse Resp BP SpO2   04/04/22 0804 97.3 °F (36.3 °C) 80 16 108/87 100 %   04/03/22 2010 98.4 °F (36.9 °C) 82 16 111/76 96 %   04/03/22 1612 97.2 °F (36.2 °C) 98 20 110/71 96 %     Oxygen Therapy  O2 Sat (%): 100 % (04/04/22 0804)  Pulse via Oximetry: 93 beats per minute (03/29/22 0329)  O2 Device: None (Room air) (04/04/22 0254)    Estimated body mass index is 22.42 kg/m² as calculated from the following:    Height as of this encounter: 5' 6\" (1.676 m). Weight as of this encounter: 63 kg (138 lb 14.2 oz). Intake/Output Summary (Last 24 hours) at 4/4/2022 1330  Last data filed at 4/4/2022 0926  Gross per 24 hour   Intake 596 ml   Output --   Net 596 ml         Physical Exam:     Blood pressure 108/87, pulse 80, temperature 97.3 °F (36.3 °C), resp. rate 16, height 5' 6\" (1.676 m), weight 63 kg (138 lb 14.2 oz), SpO2 100 %. General:    Awake, alert and agitated, mildly tremulous  Head:  Normocephalic, atraumatic  Eyes:  Sclerae appear normal.  Pupils equally round. ENT:  Nares appear normal, no drainage. Moist oral mucosa  Neck:  No restricted ROM. Trachea midline   CV:   RRR. No m/r/g.   No jugular venous distension. Lungs:   CTAB. No wheezing, rhonchi, or rales. Respirations even, unlabored  Abdomen: Bowel sounds present. Soft, nontender, nondistended. Extremities: No cyanosis or clubbing. No edema  Skin:     No rashes and normal coloration. Warm and dry. Neuro:  CN II-XII grossly intact. Sensation intact. A&Ox3  Psych:  agitated    I have reviewed ordered lab tests and independently visualized imaging below:    Recent Labs:  No results found for this or any previous visit (from the past 48 hour(s)). All Micro Results     None          Other Studies:  No results found. Current Meds:  Current Facility-Administered Medications   Medication Dose Route Frequency    [START ON 4/5/2022] OLANZapine (ZyPREXA zydis) disintegrating tablet 5 mg  5 mg Oral DAILY    LORazepam (ATIVAN) tablet 1 mg  1 mg Oral Q6H PRN    pantothenic ac-min oil-pet,hyd (AQUAPHOR) 41 % ointment   Topical PRN    traZODone (DESYREL) tablet 50 mg  50 mg Oral QHS    nicotine (NICODERM CQ) 14 mg/24 hr patch 1 Patch  1 Patch TransDERmal Q24H    sodium chloride (NS) flush 5-40 mL  5-40 mL IntraVENous Q8H    sodium chloride (NS) flush 5-40 mL  5-40 mL IntraVENous PRN    ondansetron (ZOFRAN ODT) tablet 4 mg  4 mg Oral Q8H PRN    Or    ondansetron (ZOFRAN) injection 4 mg  4 mg IntraVENous Q6H PRN    enoxaparin (LOVENOX) injection 40 mg  40 mg SubCUTAneous DAILY       Signed:  Channing Hickman DO    Part of this note may have been written by using a voice dictation software. The note has been proof read but may still contain some grammatical/other typographical errors.

## 2022-04-04 NOTE — PROGRESS NOTES
Pt awake in bed and talkative having friendly conversation with sitter and nurse. Pt seems calm and has been cooperative since waking up.

## 2022-04-04 NOTE — PROGRESS NOTES
Nurse assisted pt to dial a number per pt's request. Unsuccessful attempts x2. Pt disappointed for not being able to talk to her mom. Nurse remained with pt while attempting to make the call and removed the phone and phone cord upon finishing with phone call. Sitter remains at bedside. To report off to oncoming nurse.

## 2022-04-04 NOTE — PROGRESS NOTES
Pt requested the room phone to make a 10 minute call. Phone has been connected to room and given to patient. Pt currently in bed speaking on the phone in a calm manner. Sitter at bedside.

## 2022-04-04 NOTE — PROGRESS NOTES
MSN, CM:  Referral sent to to [facility], [address] [place] [address], and Tierney JAQUEZ

## 2022-04-04 NOTE — PROGRESS NOTES
Attempted to visit with patient    Resting    Sitter present    Had a good visit with her over the weekend to build rapport

## 2022-04-04 NOTE — PROGRESS NOTES
Ativan 1mg po given for restlessness. Asking for cell phone and belonging bag. Aware that unable to meet these needs due to precaution. Awaiting psych to see pt this morning. Sitter remains at bedside. Will monitor closely.

## 2022-04-05 PROCEDURE — 65270000029 HC RM PRIVATE

## 2022-04-05 PROCEDURE — 74011250637 HC RX REV CODE- 250/637: Performed by: FAMILY MEDICINE

## 2022-04-05 PROCEDURE — 94760 N-INVAS EAR/PLS OXIMETRY 1: CPT

## 2022-04-05 PROCEDURE — 74011250637 HC RX REV CODE- 250/637: Performed by: INTERNAL MEDICINE

## 2022-04-05 PROCEDURE — 74011000250 HC RX REV CODE- 250: Performed by: FAMILY MEDICINE

## 2022-04-05 PROCEDURE — 74011250636 HC RX REV CODE- 250/636: Performed by: FAMILY MEDICINE

## 2022-04-05 RX ORDER — OLANZAPINE 5 MG/1
10 TABLET, ORALLY DISINTEGRATING ORAL DAILY
Status: DISCONTINUED | OUTPATIENT
Start: 2022-04-06 | End: 2022-04-07

## 2022-04-05 RX ORDER — HALOPERIDOL 5 MG/ML
2 INJECTION INTRAMUSCULAR
Status: DISCONTINUED | OUTPATIENT
Start: 2022-04-05 | End: 2022-04-19 | Stop reason: HOSPADM

## 2022-04-05 RX ADMIN — ONDANSETRON 4 MG: 4 TABLET, ORALLY DISINTEGRATING ORAL at 12:15

## 2022-04-05 RX ADMIN — SODIUM CHLORIDE, PRESERVATIVE FREE 10 ML: 5 INJECTION INTRAVENOUS at 06:44

## 2022-04-05 RX ADMIN — OLANZAPINE 5 MG: 5 TABLET, ORALLY DISINTEGRATING ORAL at 11:27

## 2022-04-05 RX ADMIN — LORAZEPAM 1 MG: 1 TABLET ORAL at 11:27

## 2022-04-05 RX ADMIN — ENOXAPARIN SODIUM 40 MG: 100 INJECTION SUBCUTANEOUS at 11:27

## 2022-04-05 RX ADMIN — LORAZEPAM 1 MG: 1 TABLET ORAL at 21:20

## 2022-04-05 RX ADMIN — SODIUM CHLORIDE, PRESERVATIVE FREE 10 ML: 5 INJECTION INTRAVENOUS at 21:20

## 2022-04-05 RX ADMIN — TRAZODONE HYDROCHLORIDE 50 MG: 50 TABLET ORAL at 21:20

## 2022-04-05 NOTE — PROGRESS NOTES
Bedside report received from night nurse Shae. Assessment done as noted  Respiration even and unlabored 20/min; denies pain or nausea at present. Sitter remains at bedside. Encouraged to call with needs.

## 2022-04-05 NOTE — PROGRESS NOTES
Constant Observer Yes, Lisa Guerrero Observer Oriented Yes   High risk patients are in line of sight at all times Yes   Excess equipment/medical supplies not necessary for the care of the patient removed  Yes   All sharp or dangerous objects are removed from room: including but not limited to belts, pens & pencils, needles, medications, cosmetics, lighters, matches, nail files, watches, necklaces, glass objects, razors, razor blades, knives, aerosol sprays, drawstring pants, shoes, cords (telephone, call bells, etc.) cleaning wipes or other cleaning items, aluminum cans, not permanently attached wall décor Yes   Telephone/cell phone removed as well as TV remote (batteries can be swallowed) Yes   Patient belongings removed and labeled at nurses station Yes   Excess linen is removed from room Yes   All plastic bags are removed from the room and replaced with paper trash bags Yes   Patient is in paper scrubs or appropriate gown and using hospital socks with rubber soles Yes   No metal, hard eating utensils or hard plates are on meal tray Yes    Yes   If Crucifix is hanging on a nail, remove Crucifix as well as the nail Yes       *If any question above is answered \"No,\" documentation is required.

## 2022-04-05 NOTE — PROGRESS NOTES
Hospitalist Progress Note   Admit Date:  3/28/2022  6:49 PM   Name:  Bonnie Cherry   Age:  46 y.o. Sex:  female  :  1970   MRN:  [id number]   Room:  801/    Presenting Complaint: Drug Overdose and Mental Health Problem    Reason(s) for Admission: Intentional lithium overdose [facility] Course & Interval History:     Bonnie Cherry is a 46 y.o. female with medical history of bipolar disorder admitted with reported intentional overdose of lithium. Patient apparently took an unknown amount of lithium and \"something else\" in an attempt to commit suicide. She has her Lithium bottle with her. The quality dispensed was 120, and there are currently 105 pills counted by ER nurse. UDS is positive for amphetamines and THC. Started on Fluid resuscitation. Has been seen multiple times by Tele psychiatry and Oracio Waldron NP with psychiatry and remains on IVC papers. Subjective/24hr Events (22): Patient sleeping on arrival, easily awakens. Is upset that she can't have a cell phone, take a walk or get in touch with some of her family. Later in the day she was given the hospital phone to make calls and uses loud and profane language. ROS:  10 systems reviewed and negative except as noted above. Assessment & Plan:     #Intentional lithium overdose:  Reportedly suicidal  Psychiatry following, remains on IVC paper  Respiratory and renal status stable post overdose. 4/5 - CM/pxych assisting in finding IP psych facility. #Substance abuse:  UDS positive for amphetamine and THC  Counseled    #Bipolar disorder:  Holding home meds for time being given intention overdose of lithium with elevated lithium level  Psychiatry consulted due to suicide attempt    4/2 - trazadone qhs, prn ativan. Holding furhter lithium  4/5 - Pyschiatry recommeding addition of zyprexa 5mg daily. Added  4/6 - increase zyprexa, cont ativan prn.        Discharge Planning: Inpatient psych and involuntary psych hold. Case management consulted. Patient w/o payor source and difficult disposition. Diet:  ADULT DIET Regular  DVT PPx: SCD  Code status: Full Code    Hospital Problems as of 4/5/2022 Never Reviewed          Codes Class Noted - Resolved POA    * (Principal) Intentional lithium overdose ([facility] ICD-10-CM: L16.958P  ICD-9-CM: 985.8, E950.9  3/29/2022 - Present Unknown        Bipolar 1 disorder, depressed, severe ([facility].) ICD-10-CM: F31.4  ICD-9-CM: 296.53  11/27/2020 - Present Yes        History of methamphetamine abuse ([facility] ICD-10-CM: F15.11  ICD-9-CM: 305.73  1/26/2018 - Present Yes              Objective:     Patient Vitals for the past 24 hrs:   Temp Pulse Resp BP SpO2   04/05/22 1045 97.6 °F (36.4 °C) 100 16 117/83 99 %   04/05/22 0352 97.3 °F (36.3 °C) 85 19 107/63 93 %   04/04/22 2357 97.5 °F (36.4 °C) 82 18 108/73 98 %   04/04/22 1923 97.9 °F (36.6 °C) 85 17 101/60 96 %     Oxygen Therapy  O2 Sat (%): 99 % (04/05/22 1045)  Pulse via Oximetry: 93 beats per minute (03/29/22 0329)  O2 Device: None (Room air) (04/05/22 0518)    Estimated body mass index is 22.88 kg/m² as calculated from the following:    Height as of this encounter: 5' 6\" (1.676 m). Weight as of this encounter: 64.3 kg (141 lb 12.1 oz). Intake/Output Summary (Last 24 hours) at 4/5/2022 1619  Last data filed at 4/4/2022 2357  Gross per 24 hour   Intake 218 ml   Output --   Net 218 ml         Physical Exam:     Blood pressure 117/83, pulse 100, temperature 97.6 °F (36.4 °C), resp. rate 16, height 5' 6\" (1.676 m), weight 64.3 kg (141 lb 12.1 oz), SpO2 99 %. General:    Awake, alert and agitated  Head:  Normocephalic, atraumatic  Eyes:  Sclerae appear normal.  Pupils equally round. ENT:  Nares appear normal, no drainage. Moist oral mucosa  Neck:  No restricted ROM. Trachea midline   CV:   RRR. No m/r/g. No jugular venous distension. Lungs:   CTAB. No wheezing, rhonchi, or rales.   Respirations even, unlabored  Abdomen: Bowel sounds present. Soft, nontender, nondistended. Extremities: No cyanosis or clubbing. No edema  Skin:     No rashes and normal coloration. Warm and dry. Neuro:  CN II-XII grossly intact. Sensation intact. A&Ox3  Psych:  agitated    I have reviewed ordered lab tests and independently visualized imaging below:    Recent Labs:  No results found for this or any previous visit (from the past 48 hour(s)). All Micro Results     None          Other Studies:  No results found. Current Meds:  Current Facility-Administered Medications   Medication Dose Route Frequency    [START ON 4/6/2022] OLANZapine (ZyPREXA zydis) disintegrating tablet 10 mg  10 mg Oral DAILY    haloperidol lactate (HALDOL) injection 2 mg  2 mg IntraMUSCular BID PRN    LORazepam (ATIVAN) tablet 1 mg  1 mg Oral Q4H PRN    pantothenic ac-min oil-pet,hyd (AQUAPHOR) 41 % ointment   Topical PRN    traZODone (DESYREL) tablet 50 mg  50 mg Oral QHS    nicotine (NICODERM CQ) 14 mg/24 hr patch 1 Patch  1 Patch TransDERmal Q24H    sodium chloride (NS) flush 5-40 mL  5-40 mL IntraVENous Q8H    sodium chloride (NS) flush 5-40 mL  5-40 mL IntraVENous PRN    ondansetron (ZOFRAN ODT) tablet 4 mg  4 mg Oral Q8H PRN    Or    ondansetron (ZOFRAN) injection 4 mg  4 mg IntraVENous Q6H PRN    enoxaparin (LOVENOX) injection 40 mg  40 mg SubCUTAneous DAILY       Signed:  Kelly Madsen DO    Part of this note may have been written by using a voice dictation software. The note has been proof read but may still contain some grammatical/other typographical errors.

## 2022-04-05 NOTE — PROGRESS NOTES
Report received from off going nurse, patient resting in bed with eyes closed. Sitter at bedside. No s/s of acute distress, respirations regular and unlabored. Safety measures in place. Will continue to monitor.

## 2022-04-05 NOTE — PROGRESS NOTES
Requested to speak with CM regarding getting started on disability. CM made aware. 1241 CM a bedside  1245 pt received call from a family member. Pt talking on hospital phone yelling and cursing. Demanding to know why no one has come to check on her and why they have not brought her personal stuff and cell phone. Verbally abusive on the phone and frustrated over the hospital admission situation. Ends conversation by hanging up the phone. 1257 upset over the above situation, restless, agitated and will not stop crying. Ativan given at 1130. PS message to Dr. Mame Harding and notified. Awaiting orders. 1350 new orders noted regarding above. Will medicate as ordered  1410 resting quietly at present with eyes closed. NAD at present. Sitter remains at bedside. Will continue to monitor.

## 2022-04-05 NOTE — PROGRESS NOTES
Resting quietly at present. NAD noted. Safety maintained through out the shift. Sitter remains at bedside. To report off to on coming nurse.

## 2022-04-05 NOTE — PROGRESS NOTES
Constant Observer Yes - Name: Curry Patterson Observer Oriented YES   High risk patients are in line of sight at all times Yes   Excess equipment/medical supplies not necessary for the care of the patient removed Yes   All sharp or dangerous objects are removed from room: including but not limited to belts, pens & pencils, needles, medications, cosmetics, lighters, matches, nail files, watches, necklaces, glass objects, razors, razor blades, knives, aerosol sprays, drawstring pants, shoes, cords (telephone, call bells, etc.) cleaning wipes or other cleaning items, aluminum cans, not permanently attached wall décor Yes   Telephone/cell phone removed as well as TV remote (batteries can be swallowed) Yes   Patient belongings removed and labeled at nurses station Yes   Excess linen is removed from room Yes   All plastic bags are removed from the room and replaced with paper trash bags Yes   Patient is in paper scrubs or appropriate gown and using hospital socks with rubber soles Yes   No metal, hard eating utensils or hard plates are on meal tray Yes   Remove all cleaning agents used by [employer] Yes   If Crucifix is hanging on a nail, remove Crucifix as well as the nail Yes       *If any question above is answered \"No,\" documentation is required.

## 2022-04-05 NOTE — PROGRESS NOTES
PT made eye contact with [address] [place] [address]. [address] [place] [address] introduced self and offered support. [address] [place] [address] checked for unmet needs. PT expressed no unmet needs at this time. Rev. Aury Trotter M.Div.

## 2022-04-06 LAB
ANION GAP SERPL CALC-SCNC: 7 MMOL/L (ref 7–16)
BASOPHILS # BLD: 0.1 K/UL (ref 0–0.2)
BASOPHILS NFR BLD: 1 % (ref 0–2)
BUN SERPL-MCNC: 22 MG/DL (ref 6–23)
CALCIUM SERPL-MCNC: 9.3 MG/DL (ref 8.3–10.4)
CHLORIDE SERPL-SCNC: 107 MMOL/L (ref 98–107)
CO2 SERPL-SCNC: 27 MMOL/L (ref 21–32)
CREAT SERPL-MCNC: 0.8 MG/DL (ref 0.6–1)
DIFFERENTIAL METHOD BLD: NORMAL
EOSINOPHIL # BLD: 0.3 K/UL (ref 0–0.8)
EOSINOPHIL NFR BLD: 4 % (ref 0.5–7.8)
ERYTHROCYTE [DISTWIDTH] IN BLOOD BY AUTOMATED COUNT: 13.1 % (ref 11.9–14.6)
GLUCOSE SERPL-MCNC: 99 MG/DL (ref 65–100)
HCT VFR BLD AUTO: 39.3 % (ref 35.8–46.3)
HGB BLD-MCNC: 12.6 G/DL (ref 11.7–15.4)
IMM GRANULOCYTES # BLD AUTO: 0 K/UL (ref 0–0.5)
IMM GRANULOCYTES NFR BLD AUTO: 1 % (ref 0–5)
LYMPHOCYTES # BLD: 3.8 K/UL (ref 0.5–4.6)
LYMPHOCYTES NFR BLD: 43 % (ref 13–44)
MCH RBC QN AUTO: 28.9 PG (ref 26.1–32.9)
MCHC RBC AUTO-ENTMCNC: 32.1 G/DL (ref 31.4–35)
MCV RBC AUTO: 90.1 FL (ref 79.6–97.8)
MONOCYTES # BLD: 0.7 K/UL (ref 0.1–1.3)
MONOCYTES NFR BLD: 8 % (ref 4–12)
NEUTS SEG # BLD: 4 K/UL (ref 1.7–8.2)
NEUTS SEG NFR BLD: 45 % (ref 43–78)
NRBC # BLD: 0 K/UL (ref 0–0.2)
PLATELET # BLD AUTO: 313 K/UL (ref 150–450)
PMV BLD AUTO: 11.6 FL (ref 9.4–12.3)
POTASSIUM SERPL-SCNC: 4.1 MMOL/L (ref 3.5–5.1)
RBC # BLD AUTO: 4.36 M/UL (ref 4.05–5.2)
SODIUM SERPL-SCNC: 141 MMOL/L (ref 136–145)
WBC # BLD AUTO: 8.9 K/UL (ref 4.3–11.1)

## 2022-04-06 PROCEDURE — 80048 BASIC METABOLIC PNL TOTAL CA: CPT

## 2022-04-06 PROCEDURE — 74011250637 HC RX REV CODE- 250/637: Performed by: INTERNAL MEDICINE

## 2022-04-06 PROCEDURE — 85025 COMPLETE CBC W/AUTO DIFF WBC: CPT

## 2022-04-06 PROCEDURE — 65270000029 HC RM PRIVATE

## 2022-04-06 PROCEDURE — 74011250637 HC RX REV CODE- 250/637: Performed by: FAMILY MEDICINE

## 2022-04-06 PROCEDURE — 36415 COLL VENOUS BLD VENIPUNCTURE: CPT

## 2022-04-06 PROCEDURE — 74011000250 HC RX REV CODE- 250: Performed by: FAMILY MEDICINE

## 2022-04-06 PROCEDURE — 74011250636 HC RX REV CODE- 250/636: Performed by: FAMILY MEDICINE

## 2022-04-06 RX ADMIN — LORAZEPAM 1 MG: 1 TABLET ORAL at 13:20

## 2022-04-06 RX ADMIN — SODIUM CHLORIDE, PRESERVATIVE FREE 10 ML: 5 INJECTION INTRAVENOUS at 05:47

## 2022-04-06 RX ADMIN — ENOXAPARIN SODIUM 40 MG: 100 INJECTION SUBCUTANEOUS at 09:47

## 2022-04-06 RX ADMIN — SODIUM CHLORIDE, PRESERVATIVE FREE 10 ML: 5 INJECTION INTRAVENOUS at 22:19

## 2022-04-06 RX ADMIN — TRAZODONE HYDROCHLORIDE 50 MG: 50 TABLET ORAL at 22:19

## 2022-04-06 RX ADMIN — SODIUM CHLORIDE, PRESERVATIVE FREE 10 ML: 5 INJECTION INTRAVENOUS at 16:51

## 2022-04-06 RX ADMIN — OLANZAPINE 10 MG: 5 TABLET, ORALLY DISINTEGRATING ORAL at 09:47

## 2022-04-06 NOTE — PROGRESS NOTES
[address] received request from Staff on floor to visit PT. [address] [place] [address] attempted to visit twice. PT was asleep both times. Chart states PT is [address] [facility] prayed silently for PT, her Family, and Staff. Rev. Anthony Ellison M.Div.

## 2022-04-06 NOTE — PROGRESS NOTES
Constant Observer Yes   Constant Observer Oriented Yes   High risk patients are in line of sight at all times Yes   Excess equipment/medical supplies not necessary for the care of the patient removed Yes   All sharp or dangerous objects are removed from room: including but not limited to belts, pens & pencils, needles, medications, cosmetics, lighters, matches, nail files, watches, necklaces, glass objects, razors, razor blades, knives, aerosol sprays, drawstring pants, shoes, cords (telephone, call bells, etc.) cleaning wipes or other cleaning items, aluminum cans, not permanently attached wall décor YEs   Telephone/cell phone removed as well as TV remote (batteries can be swallowed) Yes   Patient belongings removed and labeled at nurses station Yes   Excess linen is removed from room Yes   All plastic bags are removed from the room and replaced with paper trash bags Yes   Patient is in paper scrubs or appropriate gown and using hospital socks with rubber soles Yes   No metal, hard eating utensils or hard plates are on meal tray Yes   Remove all cleaning agents used by [employer] Yes   If Crucifix is hanging on a nail, remove Crucifix as well as the nail Yes       *If any question above is answered \"No,\" documentation is required.

## 2022-04-06 NOTE — PROGRESS NOTES
Slept long intervals this shift. Suicide safety precautions explained again to pt re items not allowed in room or have in personal possessio. Requesting articles from home which would be safety  Hazards. Sitter remains at beside. Visitor  x1 for short period this shift, supervised.

## 2022-04-06 NOTE — PROGRESS NOTES
Pt resting quietly in bed, eyes closed, responds to verbal and tactile stimuli. . follows commands, responds to questions appropriately. Remains on room air, lung sunds clear, heart regular. Abdomen soft, bs present. Appetite good,  Iv left arm intact, site healthy. Labs resulted and reviewed. Sitter remains at bedside. . Remains on  Suicide precautions.

## 2022-04-06 NOTE — PROGRESS NOTES
Hospitalist Progress Note   Admit Date:  3/28/2022  6:49 PM   Name:  Brisa Kirkland   Age:  46 y.o. Sex:  female  :  1970   MRN:  [id number]   Room:  [facility]/    Presenting Complaint: Drug Overdose and Mental Health Problem    Reason(s) for Admission: Intentional lithium overdose [facility] Course & Interval History:     Brisa Kirkland is a 46 y.o. female with medical history of bipolar disorder admitted with reported intentional overdose of lithium. Patient apparently took an unknown amount of lithium and \"something else\" in an attempt to commit suicide. She has her Lithium bottle with her. The quality dispensed was 120, and there are currently 105 pills counted by ER nurse. UDS is positive for amphetamines and THC. Started on Fluid resuscitation. Has been seen multiple times by Tele psychiatry and Lady Pinon, NP with psychiatry and remains on IVC papers. Subjective/24hr Events (22): Patient sleeping on arrival, easily awakens. Sitter at the bedside. Pt was asking for permanent hair removal and eye drops. ROS:  10 systems reviewed and negative except as noted above. Assessment & Plan:     #Intentional lithium overdose:  Reportedly suicidal  Psychiatry following, remains on IVC paper  Respiratory and renal status stable post overdose. 4/6 - CM/pxych assisting in finding IP psych facility. #Substance abuse:  UDS positive for amphetamine and THC  Counseled    #Bipolar disorder:  Holding home meds for time being given intention overdose of lithium with elevated lithium level  Psychiatry consulted due to suicide attempt    4/2 - trazadone qhs, prn ativan. Holding furhter lithium  4/5 - Pyschiatry recommeding addition of zyprexa 5mg daily. Added  4/6 - increase zyprexa, cont ativan prn. Discharge Planning: Inpatient psych and involuntary psych hold. Case management consulted. Patient w/o payor source and difficult disposition.      Diet:  ADULT DIET Regular  DVT PPx: SCD  Code status: Full Code    Hospital Problems as of 4/6/2022 Never Reviewed          Codes Class Noted - Resolved POA    * (Principal) Intentional lithium overdose ([facility] ICD-10-CM: O65.356M  ICD-9-CM: 985.8, E950.9  3/29/2022 - Present Unknown        Bipolar 1 disorder, depressed, severe ([facility] ICD-10-CM: F31.4  ICD-9-CM: 296.53  11/27/2020 - Present Yes        History of methamphetamine abuse ([facility] ICD-10-CM: F15.11  ICD-9-CM: 305.73  1/26/2018 - Present Yes              Objective:     Patient Vitals for the past 24 hrs:   Temp Pulse Resp BP SpO2   04/06/22 1058 98.3 °F (36.8 °C) 90 19 125/69 99 %   04/06/22 0709 97.8 °F (36.6 °C) 75 19 125/75 99 %   04/06/22 0337 97.9 °F (36.6 °C) 80 18 97/63 95 %   04/06/22 0333 -- (!) 110 -- -- (!) 73 %   04/05/22 2239 98.1 °F (36.7 °C) (!) 106 18 124/75 93 %   04/05/22 1920 98.3 °F (36.8 °C) 85 18 120/75 97 %     Oxygen Therapy  O2 Sat (%): 99 % (04/06/22 1058)  Pulse via Oximetry: 93 beats per minute (03/29/22 0329)  O2 Device: None (Room air) (04/06/22 0709)    Estimated body mass index is 22.88 kg/m² as calculated from the following:    Height as of this encounter: 5' 6\" (1.676 m). Weight as of this encounter: 64.3 kg (141 lb 12.1 oz). Intake/Output Summary (Last 24 hours) at 4/6/2022 1427  Last data filed at 4/6/2022 1300  Gross per 24 hour   Intake 1883 ml   Output --   Net 1883 ml         Physical Exam:     Blood pressure 125/69, pulse 90, temperature 98.3 °F (36.8 °C), resp. rate 19, height 5' 6\" (1.676 m), weight 64.3 kg (141 lb 12.1 oz), SpO2 99 %. General:    Awake, alert and agitated  Head:  Normocephalic, atraumatic  Eyes:  Sclerae appear normal.  Pupils equally round. ENT:  Nares appear normal, no drainage. Moist oral mucosa  Neck:  No restricted ROM. Trachea midline   CV:   RRR. No m/r/g. No jugular venous distension. Lungs:   CTAB. No wheezing, rhonchi, or rales. Respirations even, unlabored  Abdomen:    Bowel sounds present. Soft, nontender, nondistended. Extremities: No cyanosis or clubbing. No edema  Skin:     No rashes and normal coloration. Warm and dry. Neuro:  CN II-XII grossly intact. Sensation intact. A&Ox3  Psych:  agitated    I have reviewed ordered lab tests and independently visualized imaging below:    Recent Labs:  Recent Results (from the past 48 hour(s))   METABOLIC PANEL, BASIC    Collection Time: 04/06/22  3:03 AM   Result Value Ref Range    Sodium 141 136 - 145 mmol/L    Potassium 4.1 3.5 - 5.1 mmol/L    Chloride 107 98 - 107 mmol/L    CO2 27 21 - 32 mmol/L    Anion gap 7 7 - 16 mmol/L    Glucose 99 65 - 100 mg/dL    BUN 22 6 - 23 MG/DL    Creatinine 0.80 0.6 - 1.0 MG/DL    GFR est AA >60 >60 ml/min/1.73m2    GFR est non-AA >60 >60 ml/min/1.73m2    Calcium 9.3 8.3 - 10.4 MG/DL   CBC WITH AUTOMATED DIFF    Collection Time: 04/06/22  3:03 AM   Result Value Ref Range    WBC 8.9 4.3 - 11.1 K/uL    RBC 4.36 4.05 - 5.2 M/uL    HGB 12.6 11.7 - 15.4 g/dL    HCT 39.3 35.8 - 46.3 %    MCV 90.1 79.6 - 97.8 FL    MCH 28.9 26.1 - 32.9 PG    MCHC 32.1 31.4 - 35.0 g/dL    RDW 13.1 11.9 - 14.6 %    PLATELET 519 591 - 712 K/uL    MPV 11.6 9.4 - 12.3 FL    ABSOLUTE NRBC 0.00 0.0 - 0.2 K/uL    DF AUTOMATED      NEUTROPHILS 45 43 - 78 %    LYMPHOCYTES 43 13 - 44 %    MONOCYTES 8 4.0 - 12.0 %    EOSINOPHILS 4 0.5 - 7.8 %    BASOPHILS 1 0.0 - 2.0 %    IMMATURE GRANULOCYTES 1 0.0 - 5.0 %    ABS. NEUTROPHILS 4.0 1.7 - 8.2 K/UL    ABS. LYMPHOCYTES 3.8 0.5 - 4.6 K/UL    ABS. MONOCYTES 0.7 0.1 - 1.3 K/UL    ABS. EOSINOPHILS 0.3 0.0 - 0.8 K/UL    ABS. BASOPHILS 0.1 0.0 - 0.2 K/UL    ABS. IMM. GRANS. 0.0 0.0 - 0.5 K/UL       All Micro Results     None          Other Studies:  No results found.     Current Meds:  Current Facility-Administered Medications   Medication Dose Route Frequency    OLANZapine (ZyPREXA zydis) disintegrating tablet 10 mg  10 mg Oral DAILY    haloperidol lactate (HALDOL) injection 2 mg  2 mg IntraMUSCular BID PRN    LORazepam (ATIVAN) tablet 1 mg  1 mg Oral Q4H PRN    pantothenic ac-min oil-pet,hyd (AQUAPHOR) 41 % ointment   Topical PRN    traZODone (DESYREL) tablet 50 mg  50 mg Oral QHS    nicotine (NICODERM CQ) 14 mg/24 hr patch 1 Patch  1 Patch TransDERmal Q24H    sodium chloride (NS) flush 5-40 mL  5-40 mL IntraVENous Q8H    sodium chloride (NS) flush 5-40 mL  5-40 mL IntraVENous PRN    ondansetron (ZOFRAN ODT) tablet 4 mg  4 mg Oral Q8H PRN    Or    ondansetron (ZOFRAN) injection 4 mg  4 mg IntraVENous Q6H PRN    enoxaparin (LOVENOX) injection 40 mg  40 mg SubCUTAneous DAILY       Signed:  Virginia Davis MD    Part of this note may have been written by using a voice dictation software. The note has been proof read but may still contain some grammatical/other typographical errors.

## 2022-04-06 NOTE — PROGRESS NOTES
Patient lying in bed, resting with eyes closed. No s/s of acute distress, respirations regular and unlabored. Sitter at bedside. Safety maintained throughout shift. Will continue to monitor and report off to oncoming nurse.

## 2022-04-06 NOTE — PROGRESS NOTES
MSN, CM:  Patient on [facility] Waiting list.  Other facilities also referred. Case Management will continue to follow.

## 2022-04-07 PROCEDURE — 65270000029 HC RM PRIVATE

## 2022-04-07 PROCEDURE — 74011250636 HC RX REV CODE- 250/636: Performed by: FAMILY MEDICINE

## 2022-04-07 PROCEDURE — 74011250637 HC RX REV CODE- 250/637: Performed by: FAMILY MEDICINE

## 2022-04-07 PROCEDURE — 74011250637 HC RX REV CODE- 250/637: Performed by: STUDENT IN AN ORGANIZED HEALTH CARE EDUCATION/TRAINING PROGRAM

## 2022-04-07 PROCEDURE — 74011000250 HC RX REV CODE- 250: Performed by: FAMILY MEDICINE

## 2022-04-07 PROCEDURE — 74011250637 HC RX REV CODE- 250/637: Performed by: INTERNAL MEDICINE

## 2022-04-07 PROCEDURE — 99233 SBSQ HOSP IP/OBS HIGH 50: CPT | Performed by: NURSE PRACTITIONER

## 2022-04-07 RX ORDER — DIVALPROEX SODIUM 125 MG/1
125 CAPSULE, COATED PELLETS ORAL 2 TIMES DAILY
Status: DISCONTINUED | OUTPATIENT
Start: 2022-04-07 | End: 2022-04-12

## 2022-04-07 RX ORDER — OLANZAPINE 5 MG/1
5 TABLET, ORALLY DISINTEGRATING ORAL DAILY
Status: DISCONTINUED | OUTPATIENT
Start: 2022-04-08 | End: 2022-04-19 | Stop reason: HOSPADM

## 2022-04-07 RX ADMIN — SODIUM CHLORIDE, PRESERVATIVE FREE 10 ML: 5 INJECTION INTRAVENOUS at 05:25

## 2022-04-07 RX ADMIN — SODIUM CHLORIDE, PRESERVATIVE FREE 10 ML: 5 INJECTION INTRAVENOUS at 21:09

## 2022-04-07 RX ADMIN — ENOXAPARIN SODIUM 40 MG: 100 INJECTION SUBCUTANEOUS at 08:55

## 2022-04-07 RX ADMIN — SODIUM CHLORIDE, PRESERVATIVE FREE 10 ML: 5 INJECTION INTRAVENOUS at 15:16

## 2022-04-07 RX ADMIN — LORAZEPAM 1 MG: 1 TABLET ORAL at 21:12

## 2022-04-07 RX ADMIN — OLANZAPINE 10 MG: 5 TABLET, ORALLY DISINTEGRATING ORAL at 08:55

## 2022-04-07 RX ADMIN — DIVALPROEX SODIUM 125 MG: 125 CAPSULE ORAL at 17:18

## 2022-04-07 RX ADMIN — LORAZEPAM 1 MG: 1 TABLET ORAL at 11:59

## 2022-04-07 NOTE — PROGRESS NOTES
Comprehensive Nutrition Assessment    Type and Reason for Visit: Initial,RD nutrition re-screen/LOS    Nutrition Recommendations/Plan:    Continue current diet     Malnutrition Assessment:  Malnutrition Status: No malnutrition    Nutrition Assessment:   Nutrition History: Unable to assess. Wt hx per EMR pt has been wt stable. Nutrition Background: Pt with PMH notable for substance abuse and bipolar disorder. Admitted for intentional lithium overdose. Nutrition Interval:  Pt sleeping at time of visit with sitter at bedside. RN reports pt ate 100% of breakfast this AM. Flowsheet documentation with % of meals consumed. Pt is pending placement at this time. Current Nutrition Therapies:  ADULT DIET Regular    Current Intake:   Average Meal Intake: % Average Supplement Intake: None ordered      Anthropometric Measures:  Height: 5' 6\" (167.6 cm)  Current Body Wt: 64.3 kg (141 lb 12.1 oz) (4/5), Weight source: Bed scale  BMI: 22.9, Normal weight (BMI 18.5-24. 9)  Admission Body Weight: 131 lb 9.8 oz (3/29; standing scale)  Ideal Body Weight (lbs) (Calculated): 130 lbs (59 kg), 109 %  Usual Body Wt: 63.5 kg (140 lb) (per EMR), Percent weight change: 1.3        Edema: No data recorded  Estimated Daily Nutrient Needs:  Energy (kcal/day): 6369-1914 (Kcal/kg (25-30), Weight Used: Current (64.3kg))  Protein (g/day): 51-64 (0.8-1gm/kg) Weight Used: (Current (64.3kg))  Fluid (ml/day):   (1 ml/kcal)    Nutrition Diagnosis:   No nutrition diagnosis at this time     Nutrition Interventions:   Food and/or Nutrient Delivery: Continue current diet     Coordination of Nutrition Care: No recommendation at this time  Plan of Care discussed with Veronica Bains RN; IDT rounds    Goals: Active Goal: Meet >75% estimated nutrition needs by RD follow up.     Nutrition Monitoring and Evaluation:      Food/Nutrient Intake Outcomes: Food and nutrient intake  Physical Signs/Symptoms Outcomes: Meal time behavior    Discharge Planning: No discharge needs at this time    Electronically signed by Barrie Delgado MS, ADARSHN, LD 4/7/2022 at 10:51 AM  Contact: [phone]

## 2022-04-07 NOTE — PROGRESS NOTES
Cesar Observer Yes - Name: Marianela   Cesar Observer Oriented YES   High risk patients are in line of sight at all times Yes   Excess equipment/medical supplies not necessary for the care of the patient removed Yes   All sharp or dangerous objects are removed from room: including but not limited to belts, pens & pencils, needles, medications, cosmetics, lighters, matches, nail files, watches, necklaces, glass objects, razors, razor blades, knives, aerosol sprays, drawstring pants, shoes, cords (telephone, call bells, etc.) cleaning wipes or other cleaning items, aluminum cans, not permanently attached wall décor Yes   Telephone/cell phone removed as well as TV remote (batteries can be swallowed) Yes   Patient belongings removed and labeled at nurses station Yes   Excess linen is removed from room Yes   All plastic bags are removed from the room and replaced with paper trash bags Yes   Patient is in paper scrubs or appropriate gown and using hospital socks with rubber soles Yes   No metal, hard eating utensils or hard plates are on meal tray Yes   Remove all cleaning agents used by Iftikhar's Yes   If Crucifix is hanging on a nail, remove Crucifix as well as the nail No - Fixed on the wall       *If any question above is answered \"No,\" documentation is required.

## 2022-04-07 NOTE — PROGRESS NOTES
Hospitalist Progress Note   Admit Date:  3/28/2022  6:49 PM   Name:  José Antonio Odom   Age:  46 y.o. Sex:  female  :  1970   MRN:  [id number]   Room:  801/    Presenting Complaint: Drug Overdose and Mental Health Problem    Reason(s) for Admission: Intentional lithium overdose [facility] Course & Interval History:     José Antonio Odom is a 46 y.o. female with medical history of bipolar disorder admitted with reported intentional overdose of lithium. Patient apparently took an unknown amount of lithium and \"something else\" in an attempt to commit suicide. She has her Lithium bottle with her. The quality dispensed was 120, and there are currently 105 pills counted by ER nurse. UDS is positive for amphetamines and THC. Started on Fluid resuscitation. Has been seen multiple times by Tele psychiatry and Alicia Geronimo, NP with psychiatry and remains on IVC papers. Subjective/24hr Events (22): Patient sleeping on arrival, easily awakens. Sitter at the bedside. Pt was asking for her cell phone to talk to the family. Offered hospital phone but she is refusing to use it. ROS:  10 systems reviewed and negative except as noted above. Assessment & Plan:     #Intentional lithium overdose:  Reportedly suicidal  Psychiatry following, remains on IVC paper  Respiratory and renal status stable post overdose. CM/pxych assisting in finding IP psych facility. #Substance abuse:  UDS positive for amphetamine and THC  Counseled    #Bipolar disorder:  Holding home meds for time being given intention overdose of lithium with elevated lithium level  Psychiatry consulted due to suicide attempt. 4/2 - trazadone qhs, prn ativan. Holding furhter lithium  4/5 - Pyschiatry recommeding addition of zyprexa 5mg daily. Added  4/6 - increase zyprexa, cont ativan prn. Discontinued trazodone and started on Depakote.  Decreased the dose of zyprexa    Reevaluated by Psychiatrist on 4/7      Discharge Planning: Inpatient psych and involuntary psych hold. Case management consulted. Patient w/o payor source and difficult disposition. Diet:  ADULT DIET Regular  DVT PPx: SCD  Code status: Full Code    Hospital Problems as of 4/7/2022 Never Reviewed          Codes Class Noted - Resolved POA    * (Principal) Intentional lithium overdose ([facility] ICD-10-CM: O31.105O  ICD-9-CM: 985.8, E950.9  3/29/2022 - Present Unknown        Bipolar 1 disorder, depressed, severe ([facility] ICD-10-CM: F31.4  ICD-9-CM: 296.53  11/27/2020 - Present Yes        History of methamphetamine abuse ([facility] ICD-10-CM: F15.11  ICD-9-CM: 305.73  1/26/2018 - Present Yes              Objective:     Patient Vitals for the past 24 hrs:   Temp Pulse Resp BP SpO2   04/07/22 0919 97.6 °F (36.4 °C) 87 16 115/85 99 %   04/07/22 0308 97.4 °F (36.3 °C) 85 20 121/76 94 %   04/07/22 0002 97.9 °F (36.6 °C) 84 18 (!) 107/59 97 %   04/06/22 1957 98.2 °F (36.8 °C) 92 18 120/74 96 %     Oxygen Therapy  O2 Sat (%): 99 % (04/07/22 0919)  Pulse via Oximetry: 93 beats per minute (03/29/22 0329)  O2 Device: None (Room air) (04/07/22 0217)    Estimated body mass index is 22.88 kg/m² as calculated from the following:    Height as of this encounter: 5' 6\" (1.676 m). Weight as of this encounter: 64.3 kg (141 lb 12.1 oz). Intake/Output Summary (Last 24 hours) at 4/7/2022 1558  Last data filed at 4/7/2022 0919  Gross per 24 hour   Intake 416 ml   Output --   Net 416 ml         Physical Exam:     Blood pressure 115/85, pulse 87, temperature 97.6 °F (36.4 °C), resp. rate 16, height 5' 6\" (1.676 m), weight 64.3 kg (141 lb 12.1 oz), SpO2 99 %. General:    Awake, alert and agitated  Head:  Normocephalic, atraumatic  Eyes:  Sclerae appear normal.  Pupils equally round. ENT:  Nares appear normal, no drainage. Moist oral mucosa  Neck:  No restricted ROM. Trachea midline   CV:   RRR. No m/r/g. No jugular venous distension. Lungs:   CTAB.   No wheezing, rhonchi, or rales. Respirations even, unlabored  Abdomen: Bowel sounds present. Soft, nontender, nondistended. Extremities: No cyanosis or clubbing. No edema  Skin:     No rashes and normal coloration. Warm and dry. Neuro:  CN II-XII grossly intact. Sensation intact. A&Ox3  Psych:  agitated    I have reviewed ordered lab tests and independently visualized imaging below:    Recent Labs:  Recent Results (from the past 48 hour(s))   METABOLIC PANEL, BASIC    Collection Time: 04/06/22  3:03 AM   Result Value Ref Range    Sodium 141 136 - 145 mmol/L    Potassium 4.1 3.5 - 5.1 mmol/L    Chloride 107 98 - 107 mmol/L    CO2 27 21 - 32 mmol/L    Anion gap 7 7 - 16 mmol/L    Glucose 99 65 - 100 mg/dL    BUN 22 6 - 23 MG/DL    Creatinine 0.80 0.6 - 1.0 MG/DL    GFR est AA >60 >60 ml/min/1.73m2    GFR est non-AA >60 >60 ml/min/1.73m2    Calcium 9.3 8.3 - 10.4 MG/DL   CBC WITH AUTOMATED DIFF    Collection Time: 04/06/22  3:03 AM   Result Value Ref Range    WBC 8.9 4.3 - 11.1 K/uL    RBC 4.36 4.05 - 5.2 M/uL    HGB 12.6 11.7 - 15.4 g/dL    HCT 39.3 35.8 - 46.3 %    MCV 90.1 79.6 - 97.8 FL    MCH 28.9 26.1 - 32.9 PG    MCHC 32.1 31.4 - 35.0 g/dL    RDW 13.1 11.9 - 14.6 %    PLATELET 493 618 - 821 K/uL    MPV 11.6 9.4 - 12.3 FL    ABSOLUTE NRBC 0.00 0.0 - 0.2 K/uL    DF AUTOMATED      NEUTROPHILS 45 43 - 78 %    LYMPHOCYTES 43 13 - 44 %    MONOCYTES 8 4.0 - 12.0 %    EOSINOPHILS 4 0.5 - 7.8 %    BASOPHILS 1 0.0 - 2.0 %    IMMATURE GRANULOCYTES 1 0.0 - 5.0 %    ABS. NEUTROPHILS 4.0 1.7 - 8.2 K/UL    ABS. LYMPHOCYTES 3.8 0.5 - 4.6 K/UL    ABS. MONOCYTES 0.7 0.1 - 1.3 K/UL    ABS. EOSINOPHILS 0.3 0.0 - 0.8 K/UL    ABS. BASOPHILS 0.1 0.0 - 0.2 K/UL    ABS. IMM. GRANS. 0.0 0.0 - 0.5 K/UL       All Micro Results     None          Other Studies:  No results found.     Current Meds:  Current Facility-Administered Medications   Medication Dose Route Frequency    [START ON 4/8/2022] OLANZapine (ZyPREXA zydis) disintegrating tablet 5 mg  5 mg Oral DAILY    divalproex (DEPAKOTE SPRINKLE) capsule 125 mg  125 mg Oral BID    haloperidol lactate (HALDOL) injection 2 mg  2 mg IntraMUSCular BID PRN    LORazepam (ATIVAN) tablet 1 mg  1 mg Oral Q4H PRN    pantothenic ac-min oil-pet,hyd (AQUAPHOR) 41 % ointment   Topical PRN    nicotine (NICODERM CQ) 14 mg/24 hr patch 1 Patch  1 Patch TransDERmal Q24H    sodium chloride (NS) flush 5-40 mL  5-40 mL IntraVENous Q8H    sodium chloride (NS) flush 5-40 mL  5-40 mL IntraVENous PRN    ondansetron (ZOFRAN ODT) tablet 4 mg  4 mg Oral Q8H PRN    Or    ondansetron (ZOFRAN) injection 4 mg  4 mg IntraVENous Q6H PRN    enoxaparin (LOVENOX) injection 40 mg  40 mg SubCUTAneous DAILY       Signed:  Isela Che MD    Part of this note may have been written by using a voice dictation software. The note has been proof read but may still contain some grammatical/other typographical errors.
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
## 2022-04-07 NOTE — PROGRESS NOTES
Assumed pt care. Pt in bed sleeping but wakes up drowsy when called. No pain or need expressed. Pt on room air with stable respirations. No apparent distress noted. Sitter at bedside. Suicide precautions and safety measures in place.

## 2022-04-07 NOTE — PROGRESS NOTES
Problem: Falls - Risk of  Goal: *Absence of Falls  Description: Document Mai Epps Fall Risk and appropriate interventions in the flowsheet.   Outcome: Progressing Towards Goal  Note: Fall Risk Interventions:       Mentation Interventions: Adequate sleep, hydration, pain control,Evaluate medications/consider consulting pharmacy    Medication Interventions: Evaluate medications/consider consulting pharmacy,Patient to call before getting OOB    Elimination Interventions: Call light in reach              Problem: Suicide  Goal: *STG: Remains safe in hospital  Outcome: Progressing Towards Goal  Goal: *STG/LTG: Complies with medication therapy  Outcome: Progressing Towards Goal

## 2022-04-07 NOTE — PROGRESS NOTES
Pt in bed sleeping with respirations present, even and unlabored. No apparent distress noted. Pt slept through the night and was kept safe during shift. Call light in reach. Sitter at bedside. Suicide precautions in place. Safety measures in place. Preparing to report to oncoming RN.

## 2022-04-07 NOTE — PROGRESS NOTES
Beside shift report received from [id number] [facility]  Patient lying in bed  Respirations even and unlabored on room air  No signs of distress  No needs expressed at this time Sitter at bedside  Safety measures in place

## 2022-04-08 PROCEDURE — 65270000029 HC RM PRIVATE

## 2022-04-08 PROCEDURE — 74011000250 HC RX REV CODE- 250: Performed by: FAMILY MEDICINE

## 2022-04-08 PROCEDURE — 74011250637 HC RX REV CODE- 250/637: Performed by: FAMILY MEDICINE

## 2022-04-08 PROCEDURE — 74011250637 HC RX REV CODE- 250/637: Performed by: INTERNAL MEDICINE

## 2022-04-08 PROCEDURE — 74011250637 HC RX REV CODE- 250/637: Performed by: STUDENT IN AN ORGANIZED HEALTH CARE EDUCATION/TRAINING PROGRAM

## 2022-04-08 PROCEDURE — 94760 N-INVAS EAR/PLS OXIMETRY 1: CPT

## 2022-04-08 PROCEDURE — 74011250636 HC RX REV CODE- 250/636: Performed by: FAMILY MEDICINE

## 2022-04-08 RX ADMIN — DIVALPROEX SODIUM 125 MG: 125 CAPSULE ORAL at 08:38

## 2022-04-08 RX ADMIN — LORAZEPAM 1 MG: 1 TABLET ORAL at 16:42

## 2022-04-08 RX ADMIN — LORAZEPAM 1 MG: 1 TABLET ORAL at 21:40

## 2022-04-08 RX ADMIN — DIVALPROEX SODIUM 125 MG: 125 CAPSULE ORAL at 16:43

## 2022-04-08 RX ADMIN — ENOXAPARIN SODIUM 40 MG: 100 INJECTION SUBCUTANEOUS at 08:38

## 2022-04-08 RX ADMIN — OLANZAPINE 5 MG: 5 TABLET, ORALLY DISINTEGRATING ORAL at 08:38

## 2022-04-08 RX ADMIN — SODIUM CHLORIDE, PRESERVATIVE FREE 10 ML: 5 INJECTION INTRAVENOUS at 16:45

## 2022-04-08 RX ADMIN — SODIUM CHLORIDE, PRESERVATIVE FREE 10 ML: 5 INJECTION INTRAVENOUS at 05:41

## 2022-04-08 RX ADMIN — SODIUM CHLORIDE, PRESERVATIVE FREE 10 ML: 5 INJECTION INTRAVENOUS at 21:14

## 2022-04-08 NOTE — PROGRESS NOTES
Constant Observer Leticia Hawkins   Constant Observer Oriented YES   High risk patients are in line of sight at all times Yes   Excess equipment/medical supplies not necessary for the care of the patient removed Yes   All sharp or dangerous objects are removed from room: including but not limited to belts, pens & pencils, needles, medications, cosmetics, lighters, matches, nail files, watches, necklaces, glass objects, razors, razor blades, knives, aerosol sprays, drawstring pants, shoes, cords (telephone, call bells, etc.) cleaning wipes or other cleaning items, aluminum cans, not permanently attached wall décor Yes   Telephone/cell phone removed as well as TV remote (batteries can be swallowed) Yes   Patient belongings removed and labeled at nurses station Yes   Excess linen is removed from room Yes   All plastic bags are removed from the room and replaced with paper trash bags Yes   Patient is in paper scrubs or appropriate gown and using hospital socks with rubber soles Yes   No metal, hard eating utensils or hard plates are on meal tray Yes   Remove all cleaning agents used by Iftikhar's Yes   If Crucifix is hanging on a nail, remove Crucifix as well as the nail Yes       *If any question above is answered \"No,\" documentation is required.

## 2022-04-08 NOTE — PROGRESS NOTES
Constant Observer Yes - Name: Danielle Kelley Observer Oriented YES   High risk patients are in line of sight at all times Yes   Excess equipment/medical supplies not necessary for the care of the patient removed Yes   All sharp or dangerous objects are removed from room: including but not limited to belts, pens & pencils, needles, medications, cosmetics, lighters, matches, nail files, watches, necklaces, glass objects, razors, razor blades, knives, aerosol sprays, drawstring pants, shoes, cords (telephone, call bells, etc.) cleaning wipes or other cleaning items, aluminum cans, not permanently attached wall décor Yes   Telephone/cell phone removed as well as TV remote (batteries can be swallowed) Yes   Patient belongings removed and labeled at nurses station Yes   Excess linen is removed from room Yes   All plastic bags are removed from the room and replaced with paper trash bags Yes   Patient is in paper scrubs or appropriate gown and using hospital socks with rubber soles Yes   No metal, hard eating utensils or hard plates are on meal tray Yes   Remove all cleaning agents used by Iftikhar's Yes   If Crucifix is hanging on a nail, remove Crucifix as well as the nail No - fixed to wall       *If any question above is answered \"No,\" documentation is required.

## 2022-04-08 NOTE — PROGRESS NOTES
Beside shift report received from [facility]  Patient lying in bed  Respirations even and unlabored on room air  No signs of distress  No needs expressed at this time  Sitter at bedside  Safety measures in place

## 2022-04-08 NOTE — PROGRESS NOTES
Patient resting in bed, alert and oriented, cooperative with care. Patient denies pain or distress, safety measures in place, call light within reach.

## 2022-04-08 NOTE — PROGRESS NOTES
Hospitalist Progress Note   Admit Date:  3/28/2022  6:49 PM   Name:  Eli Lopez   Age:  46 y.o. Sex:  female  :  1970   MRN:  [id number]   Room:  801/    Presenting Complaint: Drug Overdose and Mental Health Problem    Reason(s) for Admission: Intentional lithium overdose [facility] Course & Interval History:     Eli Lopez is a 46 y.o. female with medical history of bipolar disorder admitted with reported intentional overdose of lithium. Patient apparently took an unknown amount of lithium and \"something else\" in an attempt to commit suicide. She has her Lithium bottle with her. The quality dispensed was 120, and there are currently 105 pills counted by ER nurse. UDS is positive for amphetamines and THC. Started on Fluid resuscitation. Has been seen multiple times by Tele psychiatry and Bang Hernandez, NP with psychiatry and remains on IVC papers. Subjective/24hr Events (22): Patient sleeping on arrival, easily awakens. Sitter at the bedside. Pt was making phone calls through hospital phone. ROS:  10 systems reviewed and negative except as noted above. Assessment & Plan:     #Intentional lithium overdose:  Reportedly suicidal  Psychiatry following, remains on IVC paper  Respiratory and renal status stable post overdose. CM/pxych assisting in finding IP psych facility. #Substance abuse:  UDS positive for amphetamine and THC  Counseled    #Bipolar disorder:  Holding home meds for time being given intention overdose of lithium with elevated lithium level  Psychiatry consulted due to suicide attempt. 4/2 - trazadone qhs, prn ativan. Holding furhter lithium  / - Pyschiatry recommeding addition of zyprexa 5mg daily. Added  /6 - increase zyprexa, cont ativan prn. Discontinued trazodone and started on Depakote.  Decreased the dose of zyprexa    Reevaluated by Psychiatrist on       Discharge Planning: Inpatient psych and involuntary psych hold. Case management consulted. Patient w/o payor source and difficult disposition. D/c on 4/11    Diet:  ADULT DIET Regular  DVT PPx: SCD  Code status: Full Code    Hospital Problems as of 4/8/2022 Never Reviewed          Codes Class Noted - Resolved POA    * (Principal) Intentional lithium overdose ([facility] ICD-10-CM: Z90.963R  ICD-9-CM: 985.8, E950.9  3/29/2022 - Present Unknown        Bipolar 1 disorder, depressed, severe ([facility] ICD-10-CM: F31.4  ICD-9-CM: 296.53  11/27/2020 - Present Yes        History of methamphetamine abuse ([facility] ICD-10-CM: F15.11  ICD-9-CM: 305.73  1/26/2018 - Present Yes              Objective:     Patient Vitals for the past 24 hrs:   Temp Pulse Resp BP SpO2   04/08/22 1105 97.7 °F (36.5 °C) 100 18 125/72 97 %   04/08/22 0737 97.8 °F (36.6 °C) 77 16 126/77 97 %   04/08/22 0243 98.1 °F (36.7 °C) 93 16 108/60 96 %   04/07/22 2330 98.7 °F (37.1 °C) 84 18 121/81 98 %   04/07/22 1930 98.7 °F (37.1 °C) 95 18 127/75 100 %   04/07/22 1721 98 °F (36.7 °C) 81 18 128/80 100 %     Oxygen Therapy  O2 Sat (%): 97 % (04/08/22 1105)  Pulse via Oximetry: 93 beats per minute (03/29/22 0329)  O2 Device: None (Room air) (04/07/22 1930)    Estimated body mass index is 22.88 kg/m² as calculated from the following:    Height as of this encounter: 5' 6\" (1.676 m). Weight as of this encounter: 64.3 kg (141 lb 12.1 oz). No intake or output data in the 24 hours ending 04/08/22 1449      Physical Exam:     Blood pressure 125/72, pulse 100, temperature 97.7 °F (36.5 °C), resp. rate 18, height 5' 6\" (1.676 m), weight 64.3 kg (141 lb 12.1 oz), SpO2 97 %. General:    Awake, alert and agitated  Head:  Normocephalic, atraumatic  Eyes:  Sclerae appear normal.  Pupils equally round. ENT:  Nares appear normal, no drainage. Moist oral mucosa  Neck:  No restricted ROM. Trachea midline   CV:   RRR. No m/r/g. No jugular venous distension. Lungs:   CTAB. No wheezing, rhonchi, or rales.   Respirations even, unlabored  Abdomen: Bowel sounds present. Soft, nontender, nondistended. Extremities: No cyanosis or clubbing. No edema  Skin:     No rashes and normal coloration. Warm and dry. Neuro:  CN II-XII grossly intact. Sensation intact. A&Ox3  Psych:  agitated    I have reviewed ordered lab tests and independently visualized imaging below:    Recent Labs:  No results found for this or any previous visit (from the past 48 hour(s)). All Micro Results     None          Other Studies:  No results found. Current Meds:  Current Facility-Administered Medications   Medication Dose Route Frequency    OLANZapine (ZyPREXA zydis) disintegrating tablet 5 mg  5 mg Oral DAILY    divalproex (DEPAKOTE SPRINKLE) capsule 125 mg  125 mg Oral BID    haloperidol lactate (HALDOL) injection 2 mg  2 mg IntraMUSCular BID PRN    LORazepam (ATIVAN) tablet 1 mg  1 mg Oral Q4H PRN    pantothenic ac-min oil-pet,hyd (AQUAPHOR) 41 % ointment   Topical PRN    nicotine (NICODERM CQ) 14 mg/24 hr patch 1 Patch  1 Patch TransDERmal Q24H    sodium chloride (NS) flush 5-40 mL  5-40 mL IntraVENous Q8H    sodium chloride (NS) flush 5-40 mL  5-40 mL IntraVENous PRN    ondansetron (ZOFRAN ODT) tablet 4 mg  4 mg Oral Q8H PRN    Or    ondansetron (ZOFRAN) injection 4 mg  4 mg IntraVENous Q6H PRN    enoxaparin (LOVENOX) injection 40 mg  40 mg SubCUTAneous DAILY       Signed:  Sylvia Hammer MD    Part of this note may have been written by using a voice dictation software. The note has been proof read but may still contain some grammatical/other typographical errors.

## 2022-04-08 NOTE — PROGRESS NOTES
MSN, CM:  Patient continues on commitment papers. [facility] Case Management will continue to follow.

## 2022-04-08 NOTE — PROGRESS NOTES
Problem: Falls - Risk of  Goal: *Absence of Falls  Description: Document Felipe Durham Fall Risk and appropriate interventions in the flowsheet.   Outcome: Progressing Towards Goal  Note: Fall Risk Interventions:       Mentation Interventions: Adequate sleep, hydration, pain control,Evaluate medications/consider consulting pharmacy    Medication Interventions: Patient to call before getting OOB    Elimination Interventions: Call light in reach              Problem: Suicide  Goal: *STG: Remains safe in hospital  Outcome: Progressing Towards Goal  Goal: *STG/LTG: Complies with medication therapy  Outcome: Progressing Towards Goal  Goal: *STG/LTG: No longer expresses self destructive or suicidal thoughts  Outcome: Progressing Towards Goal

## 2022-04-08 NOTE — PROGRESS NOTES
Switchboard attendant called the nurses station and stated that patient has called the switchboard 10-15 times asking them to connect calls for her.   Patient notified that she can dial 9 then the number to make calls outside of the hospital

## 2022-04-09 PROCEDURE — 74011000250 HC RX REV CODE- 250: Performed by: FAMILY MEDICINE

## 2022-04-09 PROCEDURE — 74011250637 HC RX REV CODE- 250/637: Performed by: FAMILY MEDICINE

## 2022-04-09 PROCEDURE — 94760 N-INVAS EAR/PLS OXIMETRY 1: CPT

## 2022-04-09 PROCEDURE — 74011250636 HC RX REV CODE- 250/636: Performed by: FAMILY MEDICINE

## 2022-04-09 PROCEDURE — 74011250637 HC RX REV CODE- 250/637: Performed by: INTERNAL MEDICINE

## 2022-04-09 PROCEDURE — 74011250637 HC RX REV CODE- 250/637: Performed by: STUDENT IN AN ORGANIZED HEALTH CARE EDUCATION/TRAINING PROGRAM

## 2022-04-09 PROCEDURE — 65270000029 HC RM PRIVATE

## 2022-04-09 RX ORDER — DIPHENHYDRAMINE HCL 25 MG
25 CAPSULE ORAL ONCE
Status: COMPLETED | OUTPATIENT
Start: 2022-04-10 | End: 2022-04-09

## 2022-04-09 RX ORDER — LANOLIN ALCOHOL/MO/W.PET/CERES
3 CREAM (GRAM) TOPICAL ONCE
Status: COMPLETED | OUTPATIENT
Start: 2022-04-09 | End: 2022-04-09

## 2022-04-09 RX ADMIN — SODIUM CHLORIDE, PRESERVATIVE FREE 10 ML: 5 INJECTION INTRAVENOUS at 05:41

## 2022-04-09 RX ADMIN — OLANZAPINE 5 MG: 5 TABLET, ORALLY DISINTEGRATING ORAL at 09:04

## 2022-04-09 RX ADMIN — Medication 3 MG: at 02:06

## 2022-04-09 RX ADMIN — SODIUM CHLORIDE, PRESERVATIVE FREE 5 ML: 5 INJECTION INTRAVENOUS at 21:21

## 2022-04-09 RX ADMIN — DIPHENHYDRAMINE HYDROCHLORIDE 25 MG: 25 CAPSULE ORAL at 23:20

## 2022-04-09 RX ADMIN — DIVALPROEX SODIUM 125 MG: 125 CAPSULE ORAL at 17:26

## 2022-04-09 RX ADMIN — SODIUM CHLORIDE, PRESERVATIVE FREE 10 ML: 5 INJECTION INTRAVENOUS at 16:52

## 2022-04-09 RX ADMIN — LORAZEPAM 1 MG: 1 TABLET ORAL at 23:06

## 2022-04-09 RX ADMIN — DIVALPROEX SODIUM 125 MG: 125 CAPSULE ORAL at 09:03

## 2022-04-09 RX ADMIN — LORAZEPAM 1 MG: 1 TABLET ORAL at 16:47

## 2022-04-09 RX ADMIN — ENOXAPARIN SODIUM 40 MG: 100 INJECTION SUBCUTANEOUS at 09:03

## 2022-04-09 RX ADMIN — LORAZEPAM 1 MG: 1 TABLET ORAL at 09:03

## 2022-04-09 NOTE — PROGRESS NOTES
Constant Observer Rosaura Lozano   Constant Observer Oriented YES   High risk patients are in line of sight at all times Yes   Excess equipment/medical supplies not necessary for the care of the patient removed Yes   All sharp or dangerous objects are removed from room: including but not limited to belts, pens & pencils, needles, medications, cosmetics, lighters, matches, nail files, watches, necklaces, glass objects, razors, razor blades, knives, aerosol sprays, drawstring pants, shoes, cords (telephone, call bells, etc.) cleaning wipes or other cleaning items, aluminum cans, not permanently attached wall décor Yes   Telephone/cell phone removed as well as TV remote (batteries can be swallowed) Yes   Patient belongings removed and labeled at nurses station Yes   Excess linen is removed from room Yes   All plastic bags are removed from the room and replaced with paper trash bags Yes   Patient is in paper scrubs or appropriate gown and using hospital socks with rubber soles Yes   No metal, hard eating utensils or hard plates are on meal tray Yes   Remove all cleaning agents used by [employer] Yes   If Crucifix is hanging on a nail, remove Crucifix as well as the nail Yes       *If any question above is answered \"No,\" documentation is required.

## 2022-04-09 NOTE — PROGRESS NOTES
Hospitalist Progress Note   Admit Date:  3/28/2022  6:49 PM   Name:  Ita Foster   Age:  46 y.o. Sex:  female  :  1970   MRN:  [id number]   Room:  [facility]/    Presenting Complaint: Drug Overdose and Mental Health Problem    Reason(s) for Admission: Intentional lithium overdose [facility] Course & Interval History:   Ita Foster is a 46 y.o. female with medical history of bipolar disorder admitted with reported intentional overdose of lithium. Patient apparently took an unknown amount of lithium and \"something else\" in an attempt to commit suicide. She has her Lithium bottle with her. The quality dispensed was 120, and there are currently 105 pills counted by ER nurse. UDS is positive for amphetamines and THC. Started on Fluid resuscitation. Has been seen multiple times by Tele psychiatry and Johanna Luna NP with psychiatry and remains on IVC papers. Subjective/24hr Events (22): Patient sleeping on arrival, easily awakens. Sitter at the bedside. Patient is requesting that she wants to walk in the hallway. She is unable to sleep since she is off of trazodone. And her right is aching due to IV line. She is also requesting for conditioner, shampoo baby powder and other supplies. Nurse was notified to change the IV line to the other. ROS:  10 systems reviewed and negative except as noted above. Assessment & Plan:     #Intentional lithium overdose:  Reportedly suicidal  Psychiatry following, remains on IVC paper  Respiratory and renal status stable post overdose. CM/pxych assisting in finding IP psych facility. #Substance abuse:  UDS positive for amphetamine and THC  Counseled    #Bipolar disorder:  Holding home meds for time being given intention overdose of lithium with elevated lithium level  Psychiatry consulted due to suicide attempt. 4/2 - trazadone qhs, prn ativan.  Holding furhter lithium  4/5 - Pyschiatry recommeding addition of zyprexa 5mg daily. Added  4/6 - increase zyprexa, cont ativan prn. Discontinued trazodone and started on Depakote. Decreased the dose of zyprexa    Reevaluated by Psychiatrist on 4/7      Discharge Planning: Inpatient psych and involuntary psych hold. Case management consulted. Patient w/o payor source and difficult disposition. D/c on 4/11    Diet:  ADULT DIET Regular  DVT PPx: SCD  Code status: Full Code    Hospital Problems as of 4/9/2022 Never Reviewed          Codes Class Noted - Resolved POA    * (Principal) Intentional lithium overdose ([facility] ICD-10-CM: M86.654N  ICD-9-CM: 985.8, E950.9  3/29/2022 - Present Unknown        Bipolar 1 disorder, depressed, severe ([facility] ICD-10-CM: F31.4  ICD-9-CM: 296.53  11/27/2020 - Present Yes        History of methamphetamine abuse ([facility] ICD-10-CM: F15.11  ICD-9-CM: 305.73  1/26/2018 - Present Yes              Objective:     Patient Vitals for the past 24 hrs:   Temp Pulse Resp BP SpO2   04/09/22 1149 99.1 °F (37.3 °C) 83 19 120/74 98 %   04/09/22 0722 97.8 °F (36.6 °C) 81 19 (!) 104/59 96 %   04/09/22 0338 97.9 °F (36.6 °C) 72 16 115/71 98 %   04/08/22 2228 97.6 °F (36.4 °C) 98 20 119/64 97 %   04/08/22 1901 98.1 °F (36.7 °C) 64 20 126/72 99 %   04/08/22 1610 98.3 °F (36.8 °C) 85 18 118/82 98 %     Oxygen Therapy  O2 Sat (%): 98 % (04/09/22 1149)  Pulse via Oximetry: 93 beats per minute (03/29/22 0329)  O2 Device: None (Room air) (04/08/22 1901)    Estimated body mass index is 22.88 kg/m² as calculated from the following:    Height as of this encounter: 5' 6\" (1.676 m). Weight as of this encounter: 64.3 kg (141 lb 12.1 oz). Intake/Output Summary (Last 24 hours) at 4/9/2022 1250  Last data filed at 4/9/2022 1001  Gross per 24 hour   Intake 360 ml   Output --   Net 360 ml         Physical Exam:     Blood pressure 120/74, pulse 83, temperature 99.1 °F (37.3 °C), resp. rate 19, height 5' 6\" (1.676 m), weight 64.3 kg (141 lb 12.1 oz), SpO2 98 %.   General:    Awake, alert and agitated  Head:  Normocephalic, atraumatic  Eyes:  Sclerae appear normal.  Pupils equally round. ENT:  Nares appear normal, no drainage. Moist oral mucosa  Neck:  No restricted ROM. Trachea midline   CV:   RRR. No m/r/g. No jugular venous distension. Lungs:   CTAB. No wheezing, rhonchi, or rales. Respirations even, unlabored  Abdomen: Bowel sounds present. Soft, nontender, nondistended. Extremities: No cyanosis or clubbing. No edema  Skin:     No rashes and normal coloration. Warm and dry. Neuro:  CN II-XII grossly intact. Sensation intact. A&Ox3  Psych:  agitated    I have reviewed ordered lab tests and independently visualized imaging below:    Recent Labs:  No results found for this or any previous visit (from the past 48 hour(s)). All Micro Results     None          Other Studies:  No results found. Current Meds:  Current Facility-Administered Medications   Medication Dose Route Frequency    OLANZapine (ZyPREXA zydis) disintegrating tablet 5 mg  5 mg Oral DAILY    divalproex (DEPAKOTE SPRINKLE) capsule 125 mg  125 mg Oral BID    haloperidol lactate (HALDOL) injection 2 mg  2 mg IntraMUSCular BID PRN    LORazepam (ATIVAN) tablet 1 mg  1 mg Oral Q4H PRN    pantothenic ac-min oil-pet,hyd (AQUAPHOR) 41 % ointment   Topical PRN    nicotine (NICODERM CQ) 14 mg/24 hr patch 1 Patch  1 Patch TransDERmal Q24H    sodium chloride (NS) flush 5-40 mL  5-40 mL IntraVENous Q8H    sodium chloride (NS) flush 5-40 mL  5-40 mL IntraVENous PRN    ondansetron (ZOFRAN ODT) tablet 4 mg  4 mg Oral Q8H PRN    Or    ondansetron (ZOFRAN) injection 4 mg  4 mg IntraVENous Q6H PRN    enoxaparin (LOVENOX) injection 40 mg  40 mg SubCUTAneous DAILY       Signed:  Simone Drummond MD    Part of this note may have been written by using a voice dictation software. The note has been proof read but may still contain some grammatical/other typographical errors.

## 2022-04-09 NOTE — PROGRESS NOTES
Patient resting in bed, alert and oriented, cooperative with care. Sitter at bedside. Patient denies pain or distress, safety measures in place, call light within reach.

## 2022-04-09 NOTE — PROGRESS NOTES
Report received from [facility]. Patient in bed resting. Respirations even and unlabored. On RA. No needs expressed at this time. Safety measures in place. Call light within reach. Sitter at bedside. Will continue to monitor.

## 2022-04-09 NOTE — PROGRESS NOTES
Resting quietly at present. NAD noted. Safety maintained through out the shift. Sitter at bedside. To report off to on coming nurse.

## 2022-04-09 NOTE — PROGRESS NOTES
Bedside report received from night nurse Kevin Lu. Assessment done as noted  Respiration even and unlabored 20/min; denies pain or nausea at present. Sitter remains at bedside. Encouraged to call with needs. Constant Observer Yes - Name: Sparkle Braulio Observer Oriented YES   High risk patients are in line of sight at all times Yes   Excess equipment/medical supplies not necessary for the care of the patient removed Yes   All sharp or dangerous objects are removed from room: including but not limited to belts, pens & pencils, needles, medications, cosmetics, lighters, matches, nail files, watches, necklaces, glass objects, razors, razor blades, knives, aerosol sprays, drawstring pants, shoes, cords (telephone, call bells, etc.) cleaning wipes or other cleaning items, aluminum cans, not permanently attached wall décor Yes   Telephone/cell phone removed as well as TV remote (batteries can be swallowed) Yes   Patient belongings removed and labeled at nurses station Yes   Excess linen is removed from room Yes   All plastic bags are removed from the room and replaced with paper trash bags Yes   Patient is in paper scrubs or appropriate gown and using hospital socks with rubber soles Yes   No metal, hard eating utensils or hard plates are on meal tray Yes   Remove all cleaning agents used by [employer] Yes   If Crucifix is hanging on a nail, remove Crucifix as well as the nail Yes       *If any question above is answered \"No,\" documentation is required.

## 2022-04-09 NOTE — PROGRESS NOTES
PM assessment done. No changes noted. Respiration even and unlabored 20/min at rest. No s/s of pain noted at present. Sitter remains at bedside. Encouraged to call with needs.

## 2022-04-10 PROCEDURE — 74011250637 HC RX REV CODE- 250/637: Performed by: FAMILY MEDICINE

## 2022-04-10 PROCEDURE — 74011250636 HC RX REV CODE- 250/636: Performed by: FAMILY MEDICINE

## 2022-04-10 PROCEDURE — 74011250637 HC RX REV CODE- 250/637: Performed by: STUDENT IN AN ORGANIZED HEALTH CARE EDUCATION/TRAINING PROGRAM

## 2022-04-10 PROCEDURE — 74011250637 HC RX REV CODE- 250/637: Performed by: EMERGENCY MEDICINE

## 2022-04-10 PROCEDURE — 74011250637 HC RX REV CODE- 250/637: Performed by: INTERNAL MEDICINE

## 2022-04-10 PROCEDURE — 65270000029 HC RM PRIVATE

## 2022-04-10 PROCEDURE — 74011000250 HC RX REV CODE- 250: Performed by: FAMILY MEDICINE

## 2022-04-10 RX ORDER — LANOLIN ALCOHOL/MO/W.PET/CERES
3 CREAM (GRAM) TOPICAL ONCE
Status: COMPLETED | OUTPATIENT
Start: 2022-04-10 | End: 2022-04-10

## 2022-04-10 RX ORDER — DIPHENHYDRAMINE HCL 25 MG
25 CAPSULE ORAL ONCE
Status: COMPLETED | OUTPATIENT
Start: 2022-04-11 | End: 2022-04-10

## 2022-04-10 RX ORDER — LANOLIN ALCOHOL/MO/W.PET/CERES
3 CREAM (GRAM) TOPICAL ONCE
Status: COMPLETED | OUTPATIENT
Start: 2022-04-11 | End: 2022-04-10

## 2022-04-10 RX ORDER — CALCIUM CARBONATE 200(500)MG
200 TABLET,CHEWABLE ORAL
Status: DISCONTINUED | OUTPATIENT
Start: 2022-04-10 | End: 2022-04-19 | Stop reason: HOSPADM

## 2022-04-10 RX ADMIN — DIPHENHYDRAMINE HYDROCHLORIDE 25 MG: 25 CAPSULE ORAL at 23:11

## 2022-04-10 RX ADMIN — ENOXAPARIN SODIUM 40 MG: 100 INJECTION SUBCUTANEOUS at 10:13

## 2022-04-10 RX ADMIN — CALCIUM CARBONATE (ANTACID) CHEW TAB 500 MG 200 MG: 500 CHEW TAB at 01:52

## 2022-04-10 RX ADMIN — Medication 3 MG: at 01:52

## 2022-04-10 RX ADMIN — SODIUM CHLORIDE, PRESERVATIVE FREE 5 ML: 5 INJECTION INTRAVENOUS at 05:17

## 2022-04-10 RX ADMIN — SODIUM CHLORIDE, PRESERVATIVE FREE 10 ML: 5 INJECTION INTRAVENOUS at 15:39

## 2022-04-10 RX ADMIN — SODIUM CHLORIDE, PRESERVATIVE FREE 10 ML: 5 INJECTION INTRAVENOUS at 22:17

## 2022-04-10 RX ADMIN — Medication 3 MG: at 23:11

## 2022-04-10 RX ADMIN — LORAZEPAM 1 MG: 1 TABLET ORAL at 18:01

## 2022-04-10 RX ADMIN — DIVALPROEX SODIUM 125 MG: 125 CAPSULE ORAL at 17:59

## 2022-04-10 RX ADMIN — LORAZEPAM 1 MG: 1 TABLET ORAL at 10:13

## 2022-04-10 RX ADMIN — OLANZAPINE 5 MG: 5 TABLET, ORALLY DISINTEGRATING ORAL at 10:13

## 2022-04-10 RX ADMIN — LORAZEPAM 1 MG: 1 TABLET ORAL at 23:06

## 2022-04-10 RX ADMIN — DIVALPROEX SODIUM 125 MG: 125 CAPSULE ORAL at 10:13

## 2022-04-10 NOTE — PROGRESS NOTES
Patient requesting Benadryl to help her sleep. MD notified orders received. Will continue to monitor.

## 2022-04-10 NOTE — PROGRESS NOTES
Report received from [place], [address] [place] [address]. Patient in bed resting. Respirations even and unlabored. On RA. No needs expressed. Safety measures in place. Call light in reach. Sitter present at bedside. No signs of acute distress at this time. Will continue to monitor.

## 2022-04-10 NOTE — PROGRESS NOTES
Patient in bed resting. Respirations even and unlabored. Safety measures in place. Call light in reach. No signs of acute distress at this time. Sitter present at bedside. Preparing to give report to oncoming RN. Constant Observer Yes - Name: Floyd Kebede   Constant Observer Oriented YES   High risk patients are in line of sight at all times Yes   Excess equipment/medical supplies not necessary for the care of the patient removed Yes   All sharp or dangerous objects are removed from room: including but not limited to belts, pens & pencils, needles, medications, cosmetics, lighters, matches, nail files, watches, necklaces, glass objects, razors, razor blades, knives, aerosol sprays, drawstring pants, shoes, cords (telephone, call bells, etc.) cleaning wipes or other cleaning items, aluminum cans, not permanently attached wall décor Yes   Telephone/cell phone removed as well as TV remote (batteries can be swallowed) Yes   Patient belongings removed and labeled at nurses station Yes   Excess linen is removed from room Yes   All plastic bags are removed from the room and replaced with paper trash bags Yes   Patient is in paper scrubs or appropriate gown and using hospital socks with rubber soles Yes   No metal, hard eating utensils or hard plates are on meal tray Yes   Remove all cleaning agents used by [employer] Yes   If Crucifix is hanging on a nail, remove Crucifix as well as the nail Yes       *If any question above is answered \"No,\" documentation is required.

## 2022-04-10 NOTE — PROGRESS NOTES
Patient c/o inability to sleep and would like some melatonin. Patient also c/o heartburn. Orders received for melatonin and tums. Will continue to monitor.

## 2022-04-10 NOTE — PROGRESS NOTES
Resting quietly at present. NAD noted. Safety maintained through out the shift. Tells me that \"I am going home tomorrow\". Sitter remains at bedside. To report off to on coming nurse.

## 2022-04-10 NOTE — PROGRESS NOTES
Bedside report received from night nurse Arvin Paige. Assessment done as noted  Respiration even and unlabored 20/min; denies pain or nausea at present. Encouraged to call with needs.

## 2022-04-10 NOTE — PROGRESS NOTES
Hospitalist Progress Note   Admit Date:  3/28/2022  6:49 PM   Name:  Niki Wong   Age:  46 y.o. Sex:  female  :  1970   MRN:  [id number]   Room:  [facility]/    Presenting Complaint: Drug Overdose and Mental Health Problem    Reason(s) for Admission: Intentional lithium overdose [facility] Course & Interval History:   Niki Wong is a 46 y.o. female with medical history of bipolar disorder admitted with reported intentional overdose of lithium. Patient apparently took an unknown amount of lithium and \"something else\" in an attempt to commit suicide. She has her Lithium bottle with her. The quality dispensed was 120, and there are currently 105 pills counted by ER nurse. UDS is positive for amphetamines and THC. Started on Fluid resuscitation. Has been seen multiple times by Tele psychiatry and Dax Maloney NP with psychiatry and remains on IVC papers. Subjective/24hr Events (04/10/22): Patient sleeping on arrival, easily awakens. Sitter at the bedside. She is requesting for trazodone as it was helping her sleep. ROS:  10 systems reviewed and negative except as noted above. Assessment & Plan:     #Intentional lithium overdose:  Reportedly suicidal  Psychiatry following, remains on IVC paper  Respiratory and renal status stable post overdose. CM/pxych assisting in finding IP psych facility. #Substance abuse:  UDS positive for amphetamine and THC  Counseled    #Bipolar disorder:  Holding home meds for time being given intention overdose of lithium with elevated lithium level  Psychiatry consulted due to suicide attempt. / - trazadone qhs, prn ativan. Holding furhter lithium   - Pyschiatry recommeding addition of zyprexa 5mg daily. Added   - increase zyprexa, cont ativan prn. Discontinued trazodone and started on Depakote.  Decreased the dose of zyprexa    4/10 continue Depakote and Zyprexa  Reevaluated by Psychiatrist on  Discharge Planning: Inpatient psych and involuntary psych hold. Case management consulted. Patient w/o payor source and difficult disposition. D/c on 4/11    Diet:  ADULT DIET Regular  DVT PPx: Lovenox  Code status: Full Code    Hospital Problems as of 4/10/2022 Never Reviewed          Codes Class Noted - Resolved POA    * (Principal) Intentional lithium overdose ([facility] ICD-10-CM: T17.823U  ICD-9-CM: 985.8, E950.9  3/29/2022 - Present Unknown        Bipolar 1 disorder, depressed, severe ([facility] ICD-10-CM: F31.4  ICD-9-CM: 296.53  11/27/2020 - Present Yes        History of methamphetamine abuse ([facility] ICD-10-CM: F15.11  ICD-9-CM: 305.73  1/26/2018 - Present Yes              Objective:     Patient Vitals for the past 24 hrs:   Temp Pulse Resp BP SpO2   04/10/22 0729 97.8 °F (36.6 °C) 79 20 126/81 94 %   04/09/22 2329 98.4 °F (36.9 °C) 91 18 95/65 99 %   04/09/22 1951 99 °F (37.2 °C) 97 18 110/61 95 %   04/09/22 1531 98 °F (36.7 °C) 88 19 131/75 95 %   04/09/22 1149 99.1 °F (37.3 °C) 83 19 120/74 98 %     Oxygen Therapy  O2 Sat (%): 94 % (04/10/22 0729)  Pulse via Oximetry: 93 beats per minute (03/29/22 0329)  O2 Device: None (Room air) (04/10/22 1078)    Estimated body mass index is 22.88 kg/m² as calculated from the following:    Height as of this encounter: 5' 6\" (1.676 m). Weight as of this encounter: 64.3 kg (141 lb 12.1 oz). Intake/Output Summary (Last 24 hours) at 4/10/2022 1058  Last data filed at 4/10/2022 1018  Gross per 24 hour   Intake 560 ml   Output --   Net 560 ml         Physical Exam:     Blood pressure 126/81, pulse 79, temperature 97.8 °F (36.6 °C), resp. rate 20, height 5' 6\" (1.676 m), weight 64.3 kg (141 lb 12.1 oz), SpO2 94 %. General:    Awake, alert and agitated  Head:  Normocephalic, atraumatic  Eyes:  Sclerae appear normal.  Pupils equally round. ENT:  Nares appear normal, no drainage. Moist oral mucosa  Neck:  No restricted ROM. Trachea midline   CV:   RRR. No m/r/g.   No jugular venous distension. Lungs:   CTAB. No wheezing, rhonchi, or rales. Respirations even, unlabored  Abdomen: Bowel sounds present. Soft, nontender, nondistended. Extremities: No cyanosis or clubbing. No edema  Skin:     No rashes and normal coloration. Warm and dry. Neuro:  CN II-XII grossly intact. Sensation intact. A&Ox3  Psych:  agitated    I have reviewed ordered lab tests and independently visualized imaging below:    Recent Labs:  No results found for this or any previous visit (from the past 48 hour(s)). All Micro Results     None          Other Studies:  No results found. Current Meds:  Current Facility-Administered Medications   Medication Dose Route Frequency    calcium carbonate (TUMS) chewable tablet 200 mg [elemental]  200 mg Oral TID PRN    OLANZapine (ZyPREXA zydis) disintegrating tablet 5 mg  5 mg Oral DAILY    divalproex (DEPAKOTE SPRINKLE) capsule 125 mg  125 mg Oral BID    haloperidol lactate (HALDOL) injection 2 mg  2 mg IntraMUSCular BID PRN    LORazepam (ATIVAN) tablet 1 mg  1 mg Oral Q4H PRN    pantothenic ac-min oil-pet,hyd (AQUAPHOR) 41 % ointment   Topical PRN    nicotine (NICODERM CQ) 14 mg/24 hr patch 1 Patch  1 Patch TransDERmal Q24H    sodium chloride (NS) flush 5-40 mL  5-40 mL IntraVENous Q8H    sodium chloride (NS) flush 5-40 mL  5-40 mL IntraVENous PRN    ondansetron (ZOFRAN ODT) tablet 4 mg  4 mg Oral Q8H PRN    Or    ondansetron (ZOFRAN) injection 4 mg  4 mg IntraVENous Q6H PRN    enoxaparin (LOVENOX) injection 40 mg  40 mg SubCUTAneous DAILY       Signed:  Eva Key MD    Part of this note may have been written by using a voice dictation software. The note has been proof read but may still contain some grammatical/other typographical errors.

## 2022-04-11 LAB
ANION GAP SERPL CALC-SCNC: 9 MMOL/L (ref 7–16)
BASOPHILS # BLD: 0.1 K/UL (ref 0–0.2)
BASOPHILS NFR BLD: 1 % (ref 0–2)
BUN SERPL-MCNC: 21 MG/DL (ref 6–23)
CALCIUM SERPL-MCNC: 9.2 MG/DL (ref 8.3–10.4)
CHLORIDE SERPL-SCNC: 107 MMOL/L (ref 98–107)
CO2 SERPL-SCNC: 25 MMOL/L (ref 21–32)
CREAT SERPL-MCNC: 0.7 MG/DL (ref 0.6–1)
DIFFERENTIAL METHOD BLD: NORMAL
EOSINOPHIL # BLD: 0.3 K/UL (ref 0–0.8)
EOSINOPHIL NFR BLD: 3 % (ref 0.5–7.8)
ERYTHROCYTE [DISTWIDTH] IN BLOOD BY AUTOMATED COUNT: 13.2 % (ref 11.9–14.6)
GLUCOSE SERPL-MCNC: 95 MG/DL (ref 65–100)
HCT VFR BLD AUTO: 37.8 % (ref 35.8–46.3)
HGB BLD-MCNC: 12.2 G/DL (ref 11.7–15.4)
IMM GRANULOCYTES # BLD AUTO: 0.1 K/UL (ref 0–0.5)
IMM GRANULOCYTES NFR BLD AUTO: 1 % (ref 0–5)
LYMPHOCYTES # BLD: 3.7 K/UL (ref 0.5–4.6)
LYMPHOCYTES NFR BLD: 37 % (ref 13–44)
MAGNESIUM SERPL-MCNC: 2 MG/DL (ref 1.8–2.4)
MCH RBC QN AUTO: 28.7 PG (ref 26.1–32.9)
MCHC RBC AUTO-ENTMCNC: 32.3 G/DL (ref 31.4–35)
MCV RBC AUTO: 88.9 FL (ref 79.6–97.8)
MONOCYTES # BLD: 0.7 K/UL (ref 0.1–1.3)
MONOCYTES NFR BLD: 7 % (ref 4–12)
NEUTS SEG # BLD: 5.1 K/UL (ref 1.7–8.2)
NEUTS SEG NFR BLD: 51 % (ref 43–78)
NRBC # BLD: 0 K/UL (ref 0–0.2)
PHOSPHATE SERPL-MCNC: 4.9 MG/DL (ref 2.5–4.5)
PLATELET # BLD AUTO: 313 K/UL (ref 150–450)
PMV BLD AUTO: 11.9 FL (ref 9.4–12.3)
POTASSIUM SERPL-SCNC: 3.9 MMOL/L (ref 3.5–5.1)
RBC # BLD AUTO: 4.25 M/UL (ref 4.05–5.2)
SODIUM SERPL-SCNC: 141 MMOL/L (ref 136–145)
WBC # BLD AUTO: 10 K/UL (ref 4.3–11.1)

## 2022-04-11 PROCEDURE — 99233 SBSQ HOSP IP/OBS HIGH 50: CPT | Performed by: NURSE PRACTITIONER

## 2022-04-11 PROCEDURE — 74011250637 HC RX REV CODE- 250/637: Performed by: STUDENT IN AN ORGANIZED HEALTH CARE EDUCATION/TRAINING PROGRAM

## 2022-04-11 PROCEDURE — 84100 ASSAY OF PHOSPHORUS: CPT

## 2022-04-11 PROCEDURE — 74011250636 HC RX REV CODE- 250/636: Performed by: FAMILY MEDICINE

## 2022-04-11 PROCEDURE — 74011250636 HC RX REV CODE- 250/636: Performed by: INTERNAL MEDICINE

## 2022-04-11 PROCEDURE — 74011000250 HC RX REV CODE- 250: Performed by: FAMILY MEDICINE

## 2022-04-11 PROCEDURE — 80048 BASIC METABOLIC PNL TOTAL CA: CPT

## 2022-04-11 PROCEDURE — 83735 ASSAY OF MAGNESIUM: CPT

## 2022-04-11 PROCEDURE — 36415 COLL VENOUS BLD VENIPUNCTURE: CPT

## 2022-04-11 PROCEDURE — 74011250637 HC RX REV CODE- 250/637: Performed by: FAMILY MEDICINE

## 2022-04-11 PROCEDURE — 74011250637 HC RX REV CODE- 250/637: Performed by: INTERNAL MEDICINE

## 2022-04-11 PROCEDURE — 85025 COMPLETE CBC W/AUTO DIFF WBC: CPT

## 2022-04-11 PROCEDURE — 65270000029 HC RM PRIVATE

## 2022-04-11 RX ADMIN — SODIUM CHLORIDE, PRESERVATIVE FREE 10 ML: 5 INJECTION INTRAVENOUS at 05:12

## 2022-04-11 RX ADMIN — SODIUM CHLORIDE, PRESERVATIVE FREE 10 ML: 5 INJECTION INTRAVENOUS at 21:38

## 2022-04-11 RX ADMIN — DIVALPROEX SODIUM 125 MG: 125 CAPSULE ORAL at 17:00

## 2022-04-11 RX ADMIN — SODIUM CHLORIDE, PRESERVATIVE FREE 10 ML: 5 INJECTION INTRAVENOUS at 17:01

## 2022-04-11 RX ADMIN — LORAZEPAM 1 MG: 1 TABLET ORAL at 16:57

## 2022-04-11 RX ADMIN — ENOXAPARIN SODIUM 40 MG: 100 INJECTION SUBCUTANEOUS at 09:08

## 2022-04-11 RX ADMIN — OLANZAPINE 5 MG: 5 TABLET, ORALLY DISINTEGRATING ORAL at 09:07

## 2022-04-11 RX ADMIN — LORAZEPAM 1 MG: 1 TABLET ORAL at 09:07

## 2022-04-11 RX ADMIN — DIVALPROEX SODIUM 125 MG: 125 CAPSULE ORAL at 09:07

## 2022-04-11 RX ADMIN — HALOPERIDOL LACTATE 2 MG: 5 INJECTION, SOLUTION INTRAMUSCULAR at 17:54

## 2022-04-11 NOTE — PROGRESS NOTES
Hospitalist Progress Note   Admit Date:  3/28/2022  6:49 PM   Name:  Dru Sun   Age:  46 y.o. Sex:  female  :  1970   MRN:  [id number]   Room:  [facility]/    Presenting Complaint: Drug Overdose and Mental Health Problem    Reason(s) for Admission: Intentional lithium overdose [facility] Course & Interval History:   Dru Sun is a 46 y.o. female with medical history of bipolar disorder admitted with reported intentional overdose of lithium. Patient apparently took an unknown amount of lithium and \"something else\" in an attempt to commit suicide. She has her Lithium bottle with her. The quality dispensed was 120, and there are currently 105 pills counted by ER nurse. UDS is positive for amphetamines and THC. Started on Fluid resuscitation. Has been seen multiple times by Tele psychiatry and Maren Tovar NP with psychiatry and remains on IVC papers. Subjective/24hr Events (22): Patient sleeping on arrival, easily awakens. Sitter at the bedside. She is requesting for discharge to home. She is also requesting for hospital phone. ROS:  10 systems reviewed and negative except as noted above. Assessment & Plan:     #Intentional lithium overdose:  Reportedly suicidal  Psychiatry following, remains on IVC paper  Respiratory and renal status stable post overdose. CM/pxych assisting in finding IP psych facility. #Substance abuse:  UDS positive for amphetamine and THC  Counseled    #Bipolar disorder:  Holding home meds for time being given intention overdose of lithium with elevated lithium level  Psychiatry consulted due to suicide attempt. 4/2 - trazadone qhs, prn ativan. Holding furhter lithium  4/5 - Pyschiatry recommeding addition of zyprexa 5mg daily. Added  /6 - increase zyprexa, cont ativan prn. Discontinued trazodone and started on Depakote.  Decreased the dose of zyprexa    4/10 continue Depakote and Zyprexa  Reevaluated by Psychiatrist on 4/7      Discharge Planning: Inpatient psych and involuntary psych hold. Case management consulted. Patient w/o payor source and difficult disposition. Diet:  ADULT DIET Regular  DVT PPx: Lovenox  Code status: Full Code    Hospital Problems as of 4/11/2022 Never Reviewed          Codes Class Noted - Resolved POA    * (Principal) Intentional lithium overdose ([facility] ICD-10-CM: Z18.369D  ICD-9-CM: 985.8, E950.9  3/29/2022 - Present Unknown        Bipolar 1 disorder, depressed, severe ([facility] ICD-10-CM: F31.4  ICD-9-CM: 296.53  11/27/2020 - Present Yes        History of methamphetamine abuse ([facility] ICD-10-CM: F15.11  ICD-9-CM: 305.73  1/26/2018 - Present Yes              Objective:     Patient Vitals for the past 24 hrs:   Temp Pulse Resp BP SpO2   04/11/22 0848 97.5 °F (36.4 °C) 99 19 (!) 114/97 98 %   04/10/22 2249 98.5 °F (36.9 °C) 95 19 117/72 94 %   04/10/22 1922 98.5 °F (36.9 °C) 99 20 126/76 97 %   04/10/22 1507 98 °F (36.7 °C) 88 19 119/76 97 %     Oxygen Therapy  O2 Sat (%): 98 % (04/11/22 0848)  Pulse via Oximetry: 93 beats per minute (03/29/22 0329)  O2 Device: None (Room air) (04/11/22 0330)    Estimated body mass index is 22.88 kg/m² as calculated from the following:    Height as of this encounter: 5' 6\" (1.676 m). Weight as of this encounter: 64.3 kg (141 lb 12.1 oz). Intake/Output Summary (Last 24 hours) at 4/11/2022 1419  Last data filed at 4/11/2022 0848  Gross per 24 hour   Intake 236 ml   Output --   Net 236 ml         Physical Exam:     Blood pressure (!) 114/97, pulse 99, temperature 97.5 °F (36.4 °C), resp. rate 19, height 5' 6\" (1.676 m), weight 64.3 kg (141 lb 12.1 oz), SpO2 98 %. General:    Awake, alert and agitated  Head:  Normocephalic, atraumatic  Eyes:  Sclerae appear normal.  Pupils equally round. ENT:  Nares appear normal, no drainage. Moist oral mucosa  Neck:  No restricted ROM. Trachea midline   CV:   RRR. No m/r/g. No jugular venous distension. Lungs:   CTAB.   No wheezing, rhonchi, or rales. Respirations even, unlabored  Abdomen: Bowel sounds present. Soft, nontender, nondistended. Extremities: No cyanosis or clubbing. No edema  Skin:     No rashes and normal coloration. Warm and dry. Neuro:  CN II-XII grossly intact. Sensation intact. A&Ox3  Psych:  agitated    I have reviewed ordered lab tests and independently visualized imaging below:    Recent Labs:  Recent Results (from the past 48 hour(s))   CBC WITH AUTOMATED DIFF    Collection Time: 04/11/22  3:12 AM   Result Value Ref Range    WBC 10.0 4.3 - 11.1 K/uL    RBC 4.25 4.05 - 5.2 M/uL    HGB 12.2 11.7 - 15.4 g/dL    HCT 37.8 35.8 - 46.3 %    MCV 88.9 79.6 - 97.8 FL    MCH 28.7 26.1 - 32.9 PG    MCHC 32.3 31.4 - 35.0 g/dL    RDW 13.2 11.9 - 14.6 %    PLATELET 486 341 - 273 K/uL    MPV 11.9 9.4 - 12.3 FL    ABSOLUTE NRBC 0.00 0.0 - 0.2 K/uL    DF AUTOMATED      NEUTROPHILS 51 43 - 78 %    LYMPHOCYTES 37 13 - 44 %    MONOCYTES 7 4.0 - 12.0 %    EOSINOPHILS 3 0.5 - 7.8 %    BASOPHILS 1 0.0 - 2.0 %    IMMATURE GRANULOCYTES 1 0.0 - 5.0 %    ABS. NEUTROPHILS 5.1 1.7 - 8.2 K/UL    ABS. LYMPHOCYTES 3.7 0.5 - 4.6 K/UL    ABS. MONOCYTES 0.7 0.1 - 1.3 K/UL    ABS. EOSINOPHILS 0.3 0.0 - 0.8 K/UL    ABS. BASOPHILS 0.1 0.0 - 0.2 K/UL    ABS. IMM.  GRANS. 0.1 0.0 - 0.5 K/UL   METABOLIC PANEL, BASIC    Collection Time: 04/11/22  3:12 AM   Result Value Ref Range    Sodium 141 136 - 145 mmol/L    Potassium 3.9 3.5 - 5.1 mmol/L    Chloride 107 98 - 107 mmol/L    CO2 25 21 - 32 mmol/L    Anion gap 9 7 - 16 mmol/L    Glucose 95 65 - 100 mg/dL    BUN 21 6 - 23 MG/DL    Creatinine 0.70 0.6 - 1.0 MG/DL    GFR est AA >60 >60 ml/min/1.73m2    GFR est non-AA >60 >60 ml/min/1.73m2    Calcium 9.2 8.3 - 10.4 MG/DL   MAGNESIUM    Collection Time: 04/11/22  3:12 AM   Result Value Ref Range    Magnesium 2.0 1.8 - 2.4 mg/dL   PHOSPHORUS    Collection Time: 04/11/22  3:12 AM   Result Value Ref Range    Phosphorus 4.9 (H) 2.5 - 4.5 MG/DL All Micro Results     None          Other Studies:  No results found. Current Meds:  Current Facility-Administered Medications   Medication Dose Route Frequency    calcium carbonate (TUMS) chewable tablet 200 mg [elemental]  200 mg Oral TID PRN    OLANZapine (ZyPREXA zydis) disintegrating tablet 5 mg  5 mg Oral DAILY    divalproex (DEPAKOTE SPRINKLE) capsule 125 mg  125 mg Oral BID    haloperidol lactate (HALDOL) injection 2 mg  2 mg IntraMUSCular BID PRN    LORazepam (ATIVAN) tablet 1 mg  1 mg Oral Q4H PRN    pantothenic ac-min oil-pet,hyd (AQUAPHOR) 41 % ointment   Topical PRN    nicotine (NICODERM CQ) 14 mg/24 hr patch 1 Patch  1 Patch TransDERmal Q24H    sodium chloride (NS) flush 5-40 mL  5-40 mL IntraVENous Q8H    sodium chloride (NS) flush 5-40 mL  5-40 mL IntraVENous PRN    ondansetron (ZOFRAN ODT) tablet 4 mg  4 mg Oral Q8H PRN    Or    ondansetron (ZOFRAN) injection 4 mg  4 mg IntraVENous Q6H PRN    enoxaparin (LOVENOX) injection 40 mg  40 mg SubCUTAneous DAILY       Signed:  Simone Drummond MD    Part of this note may have been written by using a voice dictation software. The note has been proof read but may still contain some grammatical/other typographical errors.

## 2022-04-11 NOTE — PROGRESS NOTES
Spoke with Alejandra Pozo with patient relation via phone regarding pt's request to see patient advocate and states can see pt in AM. Pt and charge nurse made aware.

## 2022-04-11 NOTE — PROGRESS NOTES
Constant Observer Yes - Name: Nelly Cuadra Observer Oriented YES   High risk patients are in line of sight at all times Yes   Excess equipment/medical supplies not necessary for the care of the patient removed Yes   All sharp or dangerous objects are removed from room: including but not limited to belts, pens & pencils, needles, medications, cosmetics, lighters, matches, nail files, watches, necklaces, glass objects, razors, razor blades, knives, aerosol sprays, drawstring pants, shoes, cords (telephone, call bells, etc.) cleaning wipes or other cleaning items, aluminum cans, not permanently attached wall décor Yes   Telephone/cell phone removed as well as TV remote (batteries can be swallowed) Yes   Patient belongings removed and labeled at nurses station Yes   Excess linen is removed from room Yes   All plastic bags are removed from the room and replaced with paper trash bags Yes   Patient is in paper scrubs or appropriate gown and using hospital socks with rubber soles Yes   No metal, hard eating utensils or hard plates are on meal tray Yes   Remove all cleaning agents used by [employer] Yes   If Crucifix is hanging on a nail, remove Crucifix as well as the nail No- Crucifix fixed to wall. *If any question above is answered \"No,\" documentation is required.

## 2022-04-11 NOTE — PROGRESS NOTES
Constant Observer Yes - Name: Celia [place] Observer Oriented YES   High risk patients are in line of sight at all times Yes   Excess equipment/medical supplies not necessary for the care of the patient removed Yes   All sharp or dangerous objects are removed from room: including but not limited to belts, pens & pencils, needles, medications, cosmetics, lighters, matches, nail files, watches, necklaces, glass objects, razors, razor blades, knives, aerosol sprays, drawstring pants, shoes, cords (telephone, call bells, etc.) cleaning wipes or other cleaning items, aluminum cans, not permanently attached wall décor Yes    Telephone/cell phone removed as well as TV remote (batteries can be swallowed) Yes   Patient belongings removed and labeled at nurses station Yes   Excess linen is removed from room Yes   All plastic bags are removed from the room and replaced with paper trash bags Yes   Patient is in paper scrubs or appropriate gown and using hospital socks with rubber soles Yes   No metal, hard eating utensils or hard plates are on meal tray Yes   Remove all cleaning agents used by [employer] Yes   If Crucifix is hanging on a nail, remove Crucifix as well as the nail Yes       *If any question above is answered \"No,\" documentation is required.

## 2022-04-11 NOTE — PROGRESS NOTES
Awakened and upset that she does not have the phone in the room. Walking out in the hallway and yelling, cursing and demanding to have a phone saying \"i'm calling my ride. I was supposed to be discharged today and I am leaving\". Asked pt to go back to her room and is made  Aware of no plans for discharge today per MD progress notes. Continues to yell and scream and demanding to talk to someone and asking to speak to pt advocate. Sitter remains at bedside. PS message to Dr. Emerson Chao and Shiv Parrish NP of above. Charge Nurse Jez Bae made aware. Will follow.

## 2022-04-11 NOTE — PROGRESS NOTES
Patient resting quietly in bed. Respirations even and unlabored. All needs addressed. Safety measures in place. Call light in reach. Sitter present at bedside. No acute distress. Preparing to give report to oncoming RN. Constant Observer Yes - Name: Domenico   Constant Observer Oriented YES   High risk patients are in line of sight at all times Yes   Excess equipment/medical supplies not necessary for the care of the patient removed Yes   All sharp or dangerous objects are removed from room: including but not limited to belts, pens & pencils, needles, medications, cosmetics, lighters, matches, nail files, watches, necklaces, glass objects, razors, razor blades, knives, aerosol sprays, drawstring pants, shoes, cords (telephone, call bells, etc.) cleaning wipes or other cleaning items, aluminum cans, not permanently attached wall décor Yes   Telephone/cell phone removed as well as TV remote (batteries can be swallowed) Yes   Patient belongings removed and labeled at nurses station Yes   Excess linen is removed from room Yes   All plastic bags are removed from the room and replaced with paper trash bags Yes   Patient is in paper scrubs or appropriate gown and using hospital socks with rubber soles Yes   No metal, hard eating utensils or hard plates are on meal tray Yes   Remove all cleaning agents used by [employer] Yes   If Crucifix is hanging on a nail, remove Crucifix as well as the nail Yes       *If any question above is answered \"No,\" documentation is required.
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
## 2022-04-11 NOTE — PROGRESS NOTES
MSN, CM:  Patient will be seen again by Shante Anderson NP for recommendations. White papers signed by Dr. Mary Flanagan. Case Management will continue to follow. No word from Cari. Junie Prasad. Patient uninsured and no other facility will accept.

## 2022-04-11 NOTE — PROGRESS NOTES
Received bedside shift report from offgoing nurse, Parminder Ansari. Patient resting quietly in bed at this time, eyes closed, respirations even and unlabored on room air. No needs stated. Bed low and locked. Sitter at bedside as ordered for suicide precautions.

## 2022-04-12 PROCEDURE — 74011000250 HC RX REV CODE- 250: Performed by: FAMILY MEDICINE

## 2022-04-12 PROCEDURE — 74011250637 HC RX REV CODE- 250/637: Performed by: FAMILY MEDICINE

## 2022-04-12 PROCEDURE — 65270000029 HC RM PRIVATE

## 2022-04-12 PROCEDURE — 74011250636 HC RX REV CODE- 250/636: Performed by: INTERNAL MEDICINE

## 2022-04-12 PROCEDURE — 74011250637 HC RX REV CODE- 250/637: Performed by: STUDENT IN AN ORGANIZED HEALTH CARE EDUCATION/TRAINING PROGRAM

## 2022-04-12 PROCEDURE — 74011250637 HC RX REV CODE- 250/637: Performed by: INTERNAL MEDICINE

## 2022-04-12 PROCEDURE — 74011250637 HC RX REV CODE- 250/637: Performed by: HOSPITALIST

## 2022-04-12 RX ORDER — LORAZEPAM 2 MG/ML
1 INJECTION INTRAMUSCULAR ONCE
Status: DISCONTINUED | OUTPATIENT
Start: 2022-04-12 | End: 2022-04-12

## 2022-04-12 RX ORDER — CHOLECALCIFEROL (VITAMIN D3) 125 MCG
5 CAPSULE ORAL
Status: DISCONTINUED | OUTPATIENT
Start: 2022-04-12 | End: 2022-04-19 | Stop reason: HOSPADM

## 2022-04-12 RX ORDER — LORAZEPAM 1 MG/1
1 TABLET ORAL ONCE
Status: COMPLETED | OUTPATIENT
Start: 2022-04-12 | End: 2022-04-12

## 2022-04-12 RX ORDER — LORAZEPAM 2 MG/ML
1 INJECTION INTRAMUSCULAR ONCE
Status: ACTIVE | OUTPATIENT
Start: 2022-04-12 | End: 2022-04-13

## 2022-04-12 RX ORDER — DIVALPROEX SODIUM 125 MG/1
250 CAPSULE, COATED PELLETS ORAL 2 TIMES DAILY
Status: DISCONTINUED | OUTPATIENT
Start: 2022-04-12 | End: 2022-04-19 | Stop reason: HOSPADM

## 2022-04-12 RX ADMIN — LORAZEPAM 1 MG: 1 TABLET ORAL at 08:31

## 2022-04-12 RX ADMIN — LORAZEPAM 1 MG: 1 TABLET ORAL at 21:09

## 2022-04-12 RX ADMIN — SODIUM CHLORIDE, PRESERVATIVE FREE 10 ML: 5 INJECTION INTRAVENOUS at 06:33

## 2022-04-12 RX ADMIN — SODIUM CHLORIDE, PRESERVATIVE FREE 10 ML: 5 INJECTION INTRAVENOUS at 13:41

## 2022-04-12 RX ADMIN — HALOPERIDOL LACTATE 2 MG: 5 INJECTION, SOLUTION INTRAMUSCULAR at 15:58

## 2022-04-12 RX ADMIN — DIVALPROEX SODIUM 125 MG: 125 CAPSULE ORAL at 08:31

## 2022-04-12 RX ADMIN — LORAZEPAM 1 MG: 1 TABLET ORAL at 17:30

## 2022-04-12 RX ADMIN — LORAZEPAM 1 MG: 1 TABLET ORAL at 13:39

## 2022-04-12 RX ADMIN — DIVALPROEX SODIUM 250 MG: 125 CAPSULE, COATED PELLETS ORAL at 17:30

## 2022-04-12 RX ADMIN — OLANZAPINE 5 MG: 5 TABLET, ORALLY DISINTEGRATING ORAL at 08:31

## 2022-04-12 RX ADMIN — Medication 5 MG: at 22:33

## 2022-04-12 NOTE — PROGRESS NOTES
[address] [place] [address] - they initially could not find pts paperwork / made them aware pt has been on their waiting list / and their staff requested updated IVC to be faxed yesterday - which CM did. They called back and stated they were able to find some of the paperwork / asking updated IVC and notes/labs be faxed so they can give to their doctor for review. CM aware.

## 2022-04-12 NOTE — PROGRESS NOTES
Bedside shift report completed with oncoming nurse, Roberto Shown. Patient resting quietly in bed at this time, awake, respirations even and unlabored on room air. Denies needs at this time. Bed low and locked. Sitter remains at bedside for suicide precautions.

## 2022-04-12 NOTE — PROGRESS NOTES
Patient agitated  Threatening to leave the hospital  Patient verbally aggressive  towards staff and threatening to throw objects at staff members  Code Pam Duke called  Charge nurse, , and security at bedside  Patient offered ativan but refused After security left the floor patient collected items and walked to the elevator and out the lobby door  IV removed  Patient followed by primary RN  [facility] Department called  Patient escorted back to room in wheelchair by police  Patient given Haldol 2 mg IM for agitation  Sitter and patient's brother at bedside

## 2022-04-12 NOTE — PROGRESS NOTES
Problem: Falls - Risk of  Goal: *Absence of Falls  Description: Document Marciana Distance Fall Risk and appropriate interventions in the flowsheet. Outcome: Progressing Towards Goal  Note: Fall Risk Interventions:       Mentation Interventions: Evaluate medications/consider consulting pharmacy,Family/sitter at [facility] frequent rounding,Toileting rounds,Update white board    Medication Interventions: Evaluate medications/consider consulting pharmacy,Teach patient to arise slowly    Elimination Interventions:  Toilet paper/wipes in reach,Toileting schedule/hourly rounds              Problem: Suicide  Goal: *STG: Remains safe in hospital  Outcome: Progressing Towards Goal  Goal: *STG/LTG: Complies with medication therapy  Outcome: Progressing Towards Goal

## 2022-04-12 NOTE — PROGRESS NOTES
Resting quietly at present. NAD noted. Safety maintained through out the shift. To report off to on coming nurse. Constant Observer Yes - Name: Jewel Good Observer Oriented YES   High risk patients are in line of sight at all times Yes   Excess equipment/medical supplies not necessary for the care of the patient removed Yes   All sharp or dangerous objects are removed from room: including but not limited to belts, pens & pencils, needles, medications, cosmetics, lighters, matches, nail files, watches, necklaces, glass objects, razors, razor blades, knives, aerosol sprays, drawstring pants, shoes, cords (telephone, call bells, etc.) cleaning wipes or other cleaning items, aluminum cans, not permanently attached wall décor Yes   Telephone/cell phone removed as well as TV remote (batteries can be swallowed) Yes   Patient belongings removed and labeled at nurses station Yes   Excess linen is removed from room Yes   All plastic bags are removed from the room and replaced with paper trash bags Yes   Patient is in paper scrubs or appropriate gown and using hospital socks with rubber soles Yes   No metal, hard eating utensils or hard plates are on meal tray Yes   Remove all cleaning agents used by [employer] Yes   If Crucifix is hanging on a nail, remove Crucifix as well as the nail Yes       *If any question above is answered \"No,\" documentation is required.

## 2022-04-12 NOTE — PROGRESS NOTES
Patient requesting to speak with patient relations.   Patient relations office called  No answer  Message left on voicemail

## 2022-04-12 NOTE — PROGRESS NOTES
Beside shift report received from [facility]  Patient lying in bed  Respirations present  No signs of distress  Safety measures in place

## 2022-04-12 NOTE — PROGRESS NOTES
Constant Observer Yes - Name: Dion Cagle Observer Oriented YES   High risk patients are in line of sight at all times Yes   Excess equipment/medical supplies not necessary for the care of the patient removed Yes   All sharp or dangerous objects are removed from room: including but not limited to belts, pens & pencils, needles, medications, cosmetics, lighters, matches, nail files, watches, necklaces, glass objects, razors, razor blades, knives, aerosol sprays, drawstring pants, shoes, cords (telephone, call bells, etc.) cleaning wipes or other cleaning items, aluminum cans, not permanently attached wall décor Yes   Telephone/cell phone removed as well as TV remote (batteries can be swallowed) Yes   Patient belongings removed and labeled at nurses station Yes   Excess linen is removed from room Yes   All plastic bags are removed from the room and replaced with paper trash bags Yes   Patient is in paper scrubs or appropriate gown and using hospital socks with rubber soles Yes   No metal, hard eating utensils or hard plates are on meal tray Yes   Remove all cleaning agents used by [employer] Yes   If Crucifix is hanging on a nail, remove Crucifix as well as the nail No - Fixed to the wall       *If any question above is answered \"No,\" documentation is required.

## 2022-04-12 NOTE — PROGRESS NOTES
Hospitalist Progress Note   Admit Date:  3/28/2022  6:49 PM   Name:  Edie Balderrama   Age:  46 y.o. Sex:  female  :  1970   MRN:  [id number]   Room:  801/    Presenting Complaint: Drug Overdose and Mental Health Problem    Reason(s) for Admission: Intentional lithium overdose [facility] Course & Interval History:   Edie Balderrama is a 46 y.o. female with medical history of bipolar disorder admitted with reported intentional overdose of lithium. Patient apparently took an unknown amount of lithium and \"something else\" in an attempt to commit suicide. She has her Lithium bottle with her. The quality dispensed was 120, and there are currently 105 pills counted by ER nurse. UDS is positive for amphetamines and THC. Started on Fluid resuscitation. Has been seen multiple times by Tele psychiatry and Jan Andres NP with psychiatry and remains on IVC papers. Subjective/24hr Events (22): Patient sleeping on arrival, easily awakens. Sitter at the bedside. She is requesting for discharge to home. She cursed me saying \"BITCH\" discharge me today. She was dressing up and wearing shoes. Airam Shafer was called. One dose of ativan was ordered. ROS:  10 systems reviewed and negative except as noted above. Assessment & Plan:     #Intentional lithium overdose:  Reportedly suicidal  Psychiatry following, remains on IVC paper  Respiratory and renal status stable post overdose. CM/psych assisting in finding IP psych facility. #Substance abuse:  UDS positive for amphetamine and THC  Counseled    #Bipolar disorder:  Holding home meds for time being given intention overdose of lithium with elevated lithium level  Psychiatry consulted due to suicide attempt. 4/2 - trazadone qhs, prn ativan. Holding furhter lithium  4/5 - Pyschiatry recommeding addition of zyprexa 5mg daily. Added  4/6 - increase zyprexa, cont ativan prn. Discontinued trazodone and started on Depakote. Decreased the dose of zyprexa    4/10 continue Depakote and Zyprexa  Reevaluated by Psychiatrist on 4/7 4/12 Increased dose of Depakote to 250mg BID  And s/p one dose of ativan      Discharge Planning: Inpatient psych and involuntary psych hold. Case management consulted. Patient w/o payor source and difficult disposition. Diet:  ADULT DIET Regular  DVT PPx: Lovenox  Code status: Full Code    Hospital Problems as of 4/12/2022 Never Reviewed          Codes Class Noted - Resolved POA    * (Principal) Intentional lithium overdose ([facility] ICD-10-CM: K37.533K  ICD-9-CM: 985.8, E950.9  3/29/2022 - Present Unknown        Bipolar 1 disorder, depressed, severe ([facility] ICD-10-CM: F31.4  ICD-9-CM: 296.53  11/27/2020 - Present Yes        History of methamphetamine abuse ([facility] ICD-10-CM: F15.11  ICD-9-CM: 305.73  1/26/2018 - Present Yes              Objective:     Patient Vitals for the past 24 hrs:   Temp Pulse Resp BP SpO2   04/12/22 1119 97.7 °F (36.5 °C) 77 19 106/61 99 %   04/12/22 0712 97.8 °F (36.6 °C) 94 19 115/86 96 %   04/12/22 0317 97.7 °F (36.5 °C) 92 18 115/73 97 %   04/12/22 0028 -- -- -- 108/72 --   04/11/22 2259 97.9 °F (36.6 °C) 92 18 (!) 94/57 97 %   04/11/22 1909 99.1 °F (37.3 °C) 97 19 114/74 97 %     Oxygen Therapy  O2 Sat (%): 99 % (04/12/22 1119)  Pulse via Oximetry: 93 beats per minute (03/29/22 0329)  O2 Device: None (Room air) (04/12/22 0437)    Estimated body mass index is 23.91 kg/m² as calculated from the following:    Height as of this encounter: 5' 6\" (1.676 m). Weight as of this encounter: 67.2 kg (148 lb 2.4 oz). Intake/Output Summary (Last 24 hours) at 4/12/2022 1516  Last data filed at 4/12/2022 1309  Gross per 24 hour   Intake 1020 ml   Output --   Net 1020 ml         Physical Exam:     Blood pressure 106/61, pulse 77, temperature 97.7 °F (36.5 °C), resp. rate 19, height 5' 6\" (1.676 m), weight 67.2 kg (148 lb 2.4 oz), SpO2 99 %.   General:    Awake, alert and agitated  Head:  Normocephalic, atraumatic  Eyes:  Sclerae appear normal.  Pupils equally round. ENT:  Nares appear normal, no drainage. Moist oral mucosa  Neck:  No restricted ROM. Trachea midline   CV:   RRR. No m/r/g. No jugular venous distension. Lungs:   CTAB. No wheezing, rhonchi, or rales. Respirations even, unlabored  Abdomen: Bowel sounds present. Soft, nontender, nondistended. Extremities: No cyanosis or clubbing. No edema  Skin:     No rashes and normal coloration. Warm and dry. Neuro:  CN II-XII grossly intact. Sensation intact. A&Ox3  Psych:  agitated    I have reviewed ordered lab tests and independently visualized imaging below:    Recent Labs:  Recent Results (from the past 48 hour(s))   CBC WITH AUTOMATED DIFF    Collection Time: 04/11/22  3:12 AM   Result Value Ref Range    WBC 10.0 4.3 - 11.1 K/uL    RBC 4.25 4.05 - 5.2 M/uL    HGB 12.2 11.7 - 15.4 g/dL    HCT 37.8 35.8 - 46.3 %    MCV 88.9 79.6 - 97.8 FL    MCH 28.7 26.1 - 32.9 PG    MCHC 32.3 31.4 - 35.0 g/dL    RDW 13.2 11.9 - 14.6 %    PLATELET 070 510 - 021 K/uL    MPV 11.9 9.4 - 12.3 FL    ABSOLUTE NRBC 0.00 0.0 - 0.2 K/uL    DF AUTOMATED      NEUTROPHILS 51 43 - 78 %    LYMPHOCYTES 37 13 - 44 %    MONOCYTES 7 4.0 - 12.0 %    EOSINOPHILS 3 0.5 - 7.8 %    BASOPHILS 1 0.0 - 2.0 %    IMMATURE GRANULOCYTES 1 0.0 - 5.0 %    ABS. NEUTROPHILS 5.1 1.7 - 8.2 K/UL    ABS. LYMPHOCYTES 3.7 0.5 - 4.6 K/UL    ABS. MONOCYTES 0.7 0.1 - 1.3 K/UL    ABS. EOSINOPHILS 0.3 0.0 - 0.8 K/UL    ABS. BASOPHILS 0.1 0.0 - 0.2 K/UL    ABS. IMM.  GRANS. 0.1 0.0 - 0.5 K/UL   METABOLIC PANEL, BASIC    Collection Time: 04/11/22  3:12 AM   Result Value Ref Range    Sodium 141 136 - 145 mmol/L    Potassium 3.9 3.5 - 5.1 mmol/L    Chloride 107 98 - 107 mmol/L    CO2 25 21 - 32 mmol/L    Anion gap 9 7 - 16 mmol/L    Glucose 95 65 - 100 mg/dL    BUN 21 6 - 23 MG/DL    Creatinine 0.70 0.6 - 1.0 MG/DL    GFR est AA >60 >60 ml/min/1.73m2    GFR est non-AA >60 >60 ml/min/1.73m2    Calcium 9.2 8.3 - 10.4 MG/DL   MAGNESIUM    Collection Time: 04/11/22  3:12 AM   Result Value Ref Range    Magnesium 2.0 1.8 - 2.4 mg/dL   PHOSPHORUS    Collection Time: 04/11/22  3:12 AM   Result Value Ref Range    Phosphorus 4.9 (H) 2.5 - 4.5 MG/DL       All Micro Results     None          Other Studies:  No results found. Current Meds:  Current Facility-Administered Medications   Medication Dose Route Frequency    LORazepam (ATIVAN) tablet 1 mg  1 mg Oral ONCE    LORazepam (ATIVAN) injection 1 mg  1 mg IntraVENous ONCE    calcium carbonate (TUMS) chewable tablet 200 mg [elemental]  200 mg Oral TID PRN    OLANZapine (ZyPREXA zydis) disintegrating tablet 5 mg  5 mg Oral DAILY    divalproex (DEPAKOTE SPRINKLE) capsule 125 mg  125 mg Oral BID    haloperidol lactate (HALDOL) injection 2 mg  2 mg IntraMUSCular BID PRN    LORazepam (ATIVAN) tablet 1 mg  1 mg Oral Q4H PRN    pantothenic ac-min oil-pet,hyd (AQUAPHOR) 41 % ointment   Topical PRN    nicotine (NICODERM CQ) 14 mg/24 hr patch 1 Patch  1 Patch TransDERmal Q24H    sodium chloride (NS) flush 5-40 mL  5-40 mL IntraVENous Q8H    sodium chloride (NS) flush 5-40 mL  5-40 mL IntraVENous PRN    ondansetron (ZOFRAN ODT) tablet 4 mg  4 mg Oral Q8H PRN    Or    ondansetron (ZOFRAN) injection 4 mg  4 mg IntraVENous Q6H PRN    enoxaparin (LOVENOX) injection 40 mg  40 mg SubCUTAneous DAILY       Signed:  Lauren Tapia MD    Part of this note may have been written by using a voice dictation software. The note has been proof read but may still contain some grammatical/other typographical errors.

## 2022-04-13 LAB
ANION GAP SERPL CALC-SCNC: 8 MMOL/L (ref 7–16)
BASOPHILS # BLD: 0.1 K/UL (ref 0–0.2)
BASOPHILS NFR BLD: 1 % (ref 0–2)
BUN SERPL-MCNC: 20 MG/DL (ref 6–23)
CALCIUM SERPL-MCNC: 9.1 MG/DL (ref 8.3–10.4)
CHLORIDE SERPL-SCNC: 109 MMOL/L (ref 98–107)
CO2 SERPL-SCNC: 25 MMOL/L (ref 21–32)
CREAT SERPL-MCNC: 0.5 MG/DL (ref 0.6–1)
DIFFERENTIAL METHOD BLD: ABNORMAL
EOSINOPHIL # BLD: 0.3 K/UL (ref 0–0.8)
EOSINOPHIL NFR BLD: 3 % (ref 0.5–7.8)
ERYTHROCYTE [DISTWIDTH] IN BLOOD BY AUTOMATED COUNT: 13.2 % (ref 11.9–14.6)
GLUCOSE SERPL-MCNC: 102 MG/DL (ref 65–100)
HCT VFR BLD AUTO: 36 % (ref 35.8–46.3)
HGB BLD-MCNC: 11.6 G/DL (ref 11.7–15.4)
IMM GRANULOCYTES # BLD AUTO: 0.1 K/UL (ref 0–0.5)
IMM GRANULOCYTES NFR BLD AUTO: 1 % (ref 0–5)
LYMPHOCYTES # BLD: 3.2 K/UL (ref 0.5–4.6)
LYMPHOCYTES NFR BLD: 37 % (ref 13–44)
MAGNESIUM SERPL-MCNC: 2.1 MG/DL (ref 1.8–2.4)
MCH RBC QN AUTO: 28.8 PG (ref 26.1–32.9)
MCHC RBC AUTO-ENTMCNC: 32.2 G/DL (ref 31.4–35)
MCV RBC AUTO: 89.3 FL (ref 79.6–97.8)
MONOCYTES # BLD: 0.7 K/UL (ref 0.1–1.3)
MONOCYTES NFR BLD: 8 % (ref 4–12)
NEUTS SEG # BLD: 4.5 K/UL (ref 1.7–8.2)
NEUTS SEG NFR BLD: 52 % (ref 43–78)
NRBC # BLD: 0 K/UL (ref 0–0.2)
PHOSPHATE SERPL-MCNC: 5.7 MG/DL (ref 2.5–4.5)
PLATELET # BLD AUTO: 304 K/UL (ref 150–450)
PMV BLD AUTO: 11.4 FL (ref 9.4–12.3)
POTASSIUM SERPL-SCNC: 4.1 MMOL/L (ref 3.5–5.1)
RBC # BLD AUTO: 4.03 M/UL (ref 4.05–5.2)
SODIUM SERPL-SCNC: 142 MMOL/L (ref 136–145)
WBC # BLD AUTO: 8.7 K/UL (ref 4.3–11.1)

## 2022-04-13 PROCEDURE — 74011250637 HC RX REV CODE- 250/637: Performed by: FAMILY MEDICINE

## 2022-04-13 PROCEDURE — 84100 ASSAY OF PHOSPHORUS: CPT

## 2022-04-13 PROCEDURE — 65270000029 HC RM PRIVATE

## 2022-04-13 PROCEDURE — 85025 COMPLETE CBC W/AUTO DIFF WBC: CPT

## 2022-04-13 PROCEDURE — 74011250637 HC RX REV CODE- 250/637: Performed by: STUDENT IN AN ORGANIZED HEALTH CARE EDUCATION/TRAINING PROGRAM

## 2022-04-13 PROCEDURE — 74011250637 HC RX REV CODE- 250/637: Performed by: INTERNAL MEDICINE

## 2022-04-13 PROCEDURE — 83735 ASSAY OF MAGNESIUM: CPT

## 2022-04-13 PROCEDURE — 74011250637 HC RX REV CODE- 250/637: Performed by: HOSPITALIST

## 2022-04-13 PROCEDURE — 36415 COLL VENOUS BLD VENIPUNCTURE: CPT

## 2022-04-13 PROCEDURE — 99231 SBSQ HOSP IP/OBS SF/LOW 25: CPT | Performed by: NURSE PRACTITIONER

## 2022-04-13 PROCEDURE — 80048 BASIC METABOLIC PNL TOTAL CA: CPT

## 2022-04-13 RX ORDER — DIPHENHYDRAMINE HCL 25 MG
25 CAPSULE ORAL
Status: DISCONTINUED | OUTPATIENT
Start: 2022-04-13 | End: 2022-04-19 | Stop reason: HOSPADM

## 2022-04-13 RX ADMIN — LORAZEPAM 1 MG: 1 TABLET ORAL at 15:02

## 2022-04-13 RX ADMIN — OLANZAPINE 5 MG: 5 TABLET, ORALLY DISINTEGRATING ORAL at 08:46

## 2022-04-13 RX ADMIN — DIPHENHYDRAMINE HYDROCHLORIDE 25 MG: 25 CAPSULE ORAL at 00:46

## 2022-04-13 RX ADMIN — Medication 5 MG: at 22:37

## 2022-04-13 RX ADMIN — LORAZEPAM 1 MG: 1 TABLET ORAL at 22:37

## 2022-04-13 RX ADMIN — DIVALPROEX SODIUM 250 MG: 125 CAPSULE, COATED PELLETS ORAL at 18:11

## 2022-04-13 RX ADMIN — DIVALPROEX SODIUM 250 MG: 125 CAPSULE, COATED PELLETS ORAL at 08:46

## 2022-04-13 RX ADMIN — DIPHENHYDRAMINE HYDROCHLORIDE 25 MG: 25 CAPSULE ORAL at 22:37

## 2022-04-13 RX ADMIN — CALCIUM CARBONATE (ANTACID) CHEW TAB 500 MG 200 MG: 500 CHEW TAB at 23:56

## 2022-04-13 NOTE — PROGRESS NOTES
Follow-up. [address] checked with sitter. PT invited [facility] in. PT remembered [facility]. [facility] offered compassionate spiritual presence. When PT began to complain, CH stated she was checking on PT and encouraged PT to rest.     Rev. Melita Nunes M.Div. .

## 2022-04-13 NOTE — PROGRESS NOTES
Pt refused 11pm vitals. Pt awoke and began to get frustrated with PCT, stating that they had just gotten her vitals, vitals were most recently taken at [address], this was explained to patient. Patient still refused. Will monitor and attempt vitals again at 3am per floor protocol.

## 2022-04-13 NOTE — PROGRESS NOTES
MSN, CM:  Patient continues on involuntary commitment papers. New papers and drug screen faxed to [facility] this AM.  Still awaiting acceptance. Case Management will continue to follow.

## 2022-04-13 NOTE — PROGRESS NOTES
Constant Observer Yes - Name: Siria Rosado   Constant Observer Oriented YES   High risk patients are in line of sight at all times Yes   Excess equipment/medical supplies not necessary for the care of the patient removed Yes   All sharp or dangerous objects are removed from room: including but not limited to belts, pens & pencils, needles, medications, cosmetics, lighters, matches, nail files, watches, necklaces, glass objects, razors, razor blades, knives, aerosol sprays, drawstring pants, shoes, cords (telephone, call bells, etc.) cleaning wipes or other cleaning items, aluminum cans, not permanently attached wall décor Yes   Telephone/cell phone removed as well as TV remote (batteries can be swallowed) Yes   Patient belongings removed and labeled at nurses station Yes   Excess linen is removed from room Yes   All plastic bags are removed from the room and replaced with paper trash bags Yes   Patient is in paper scrubs or appropriate gown and using hospital socks with rubber soles Yes   No metal, hard eating utensils or hard plates are on meal tray Yes   Remove all cleaning agents used by [employer] Yes   If Crucifix is hanging on a nail, remove Crucifix as well as the nail Yes       *If any question above is answered \"No,\" documentation is required.

## 2022-04-13 NOTE — PROGRESS NOTES
Resting quietly in bed, eyes closed, breathing unlabored even. . remains on suicide precautions, sitter at bedside. Rails up x2, door open. No iv access noted.

## 2022-04-13 NOTE — PROGRESS NOTES
Hospitalist Progress Note   Admit Date:  3/28/2022  6:49 PM   Name:  Cecile Del Toro   Age:  46 y.o. Sex:  female  :  1970   MRN:  [id number]   Room:  801/01    Presenting Complaint: Drug Overdose and Mental Health Problem    Reason(s) for Admission: Intentional lithium overdose [facility] Course & Interval History:   Cecile Del Toro is a 46 y.o. female with medical history of bipolar disorder admitted with reported intentional overdose of lithium. Patient apparently took an unknown amount of lithium and \"something else\" in an attempt to commit suicide. She has her Lithium bottle with her. The quality dispensed was 120, and there are currently 105 pills counted by ER nurse. UDS is positive for amphetamines and THC. Started on Fluid resuscitation. Has been seen multiple times by Tele psychiatry and Philippe Watkins NP with psychiatry and remains on IVC papers. Subjective/24hr Events (22): Patient seen at bedside, resting in bed comfortably. Reports feeling okay, reports feeling frustrated and wanting to go home and does not want stay in the hospital anymore. I politely counseled about her being on involuntary commitment papers due to her suicidal ideation. She does deny of active suicidal ideation or having any plan. Denies any chest pain, headache, nausea vomiting or diarrhea. ROS:  10 systems reviewed and negative except as noted above. Assessment & Plan:     #Intentional lithium overdose:  :  Repeat telepsych eval today to reassess patient mental capacity this patient is currently denying active suicidal ideation. Currently on involuntary commitment papers, renewed again on   Respiratory and renal status stable post overdose. CM/psych assisting in finding IP psych facility.      #Substance abuse:  UDS positive for amphetamine and THC  Counseled    #Bipolar disorder:  Holding home meds for time being given intention overdose of lithium with elevated lithium level  Psychiatry consulted due to suicide attempt. 4/2 - trazadone qhs, prn ativan. Holding furhter lithium  4/5 - Pyschiatry recommeding addition of zyprexa 5mg daily. Added  4/6 - increase zyprexa, cont ativan prn. Discontinued trazodone and started on Depakote. Decreased the dose of zyprexa    4/10 continue Depakote and Zyprexa  Reevaluated by Psychiatrist on 4/7 4/12 Increased dose of Depakote to 250mg BID  And s/p one dose of ativan  4/13: Continue Depakote to 50 mg twice daily      Discharge Planning: Inpatient psych and involuntary psych hold. Case management consulted. Patient w/o payor source and difficult disposition. Diet:  ADULT DIET Regular  DVT PPx: Lovenox  Code status: Full Code    Hospital Problems as of 4/13/2022 Never Reviewed          Codes Class Noted - Resolved POA    * (Principal) Intentional lithium overdose ([facility] [place] [facility] ICD-10-CM: M95.668T  ICD-9-CM: 985.8, E950.9  3/29/2022 - Present Unknown        Bipolar 1 disorder, depressed, severe ([facility] [place] ICD-10-CM: F31.4  ICD-9-CM: 296.53  11/27/2020 - Present Yes        History of methamphetamine abuse ([facility] [place] ICD-10-CM: F15.11  ICD-9-CM: 305.73  1/26/2018 - Present Yes              Objective:     Patient Vitals for the past 24 hrs:   Temp Pulse Resp BP SpO2   04/13/22 0310 98 °F (36.7 °C) 77 18 117/74 93 %   04/13/22 0026 97.6 °F (36.4 °C) 88 19 119/75 97 %   04/12/22 1915 97.9 °F (36.6 °C) 100 18 (!) 100/58 93 %   04/12/22 1119 97.7 °F (36.5 °C) 77 19 106/61 99 %     Oxygen Therapy  O2 Sat (%): 93 % (04/13/22 0310)  Pulse via Oximetry: 93 beats per minute (03/29/22 0329)  O2 Device: None (Room air) (04/13/22 3674)    Estimated body mass index is 23.91 kg/m² as calculated from the following:    Height as of this encounter: 5' 6\" (1.676 m). Weight as of this encounter: 67.2 kg (148 lb 2.4 oz).     Intake/Output Summary (Last 24 hours) at 4/13/2022 0715  Last data filed at 4/12/2022 1309  Gross per 24 hour   Intake 420 ml   Output --   Net 420 ml         Physical Exam:     Blood pressure 117/74, pulse 77, temperature 98 °F (36.7 °C), resp. rate 18, height 5' 6\" (1.676 m), weight 67.2 kg (148 lb 2.4 oz), SpO2 93 %. General:    Alert, awake, NAD, on room air  HEENT:           head NCAT, PERRLA positive, MMM  Neck:  No restricted ROM. Trachea midline   CV:   RRR. No m/r/g. No jugular venous distension. Lungs:   CTAB. No wheezing, rhonchi, or rales. Respirations even, unlabored  Abdomen: Bowel sounds present. Soft, nontender, nondistended. Extremities: No cyanosis or clubbing. No edema  Skin:     No rashes and normal coloration. Warm and dry. Neuro:  CN II-XII grossly intact. Sensation intact. A&Ox3  Psych:  AOx3, agitated and restless    I have reviewed ordered lab tests and independently visualized imaging below:    Recent Labs:  Recent Results (from the past 48 hour(s))   CBC WITH AUTOMATED DIFF    Collection Time: 04/13/22  3:29 AM   Result Value Ref Range    WBC 8.7 4.3 - 11.1 K/uL    RBC 4.03 (L) 4.05 - 5.2 M/uL    HGB 11.6 (L) 11.7 - 15.4 g/dL    HCT 36.0 35.8 - 46.3 %    MCV 89.3 79.6 - 97.8 FL    MCH 28.8 26.1 - 32.9 PG    MCHC 32.2 31.4 - 35.0 g/dL    RDW 13.2 11.9 - 14.6 %    PLATELET 856 964 - 408 K/uL    MPV 11.4 9.4 - 12.3 FL    ABSOLUTE NRBC 0.00 0.0 - 0.2 K/uL    DF AUTOMATED      NEUTROPHILS 52 43 - 78 %    LYMPHOCYTES 37 13 - 44 %    MONOCYTES 8 4.0 - 12.0 %    EOSINOPHILS 3 0.5 - 7.8 %    BASOPHILS 1 0.0 - 2.0 %    IMMATURE GRANULOCYTES 1 0.0 - 5.0 %    ABS. NEUTROPHILS 4.5 1.7 - 8.2 K/UL    ABS. LYMPHOCYTES 3.2 0.5 - 4.6 K/UL    ABS. MONOCYTES 0.7 0.1 - 1.3 K/UL    ABS. EOSINOPHILS 0.3 0.0 - 0.8 K/UL    ABS. BASOPHILS 0.1 0.0 - 0.2 K/UL    ABS. IMM.  GRANS. 0.1 0.0 - 0.5 K/UL   METABOLIC PANEL, BASIC    Collection Time: 04/13/22  3:29 AM   Result Value Ref Range    Sodium 142 136 - 145 mmol/L    Potassium 4.1 3.5 - 5.1 mmol/L    Chloride 109 (H) 98 - 107 mmol/L    CO2 25 21 - 32 mmol/L    Anion gap 8 7 - 16 mmol/L    Glucose 102 (H) 65 - 100 mg/dL    BUN 20 6 - 23 MG/DL    Creatinine 0.50 (L) 0.6 - 1.0 MG/DL    GFR est AA >60 >60 ml/min/1.73m2    GFR est non-AA >60 >60 ml/min/1.73m2    Calcium 9.1 8.3 - 10.4 MG/DL   MAGNESIUM    Collection Time: 04/13/22  3:29 AM   Result Value Ref Range    Magnesium 2.1 1.8 - 2.4 mg/dL   PHOSPHORUS    Collection Time: 04/13/22  3:29 AM   Result Value Ref Range    Phosphorus 5.7 (H) 2.5 - 4.5 MG/DL       All Micro Results     None          Other Studies:  No results found. Current Meds:  Current Facility-Administered Medications   Medication Dose Route Frequency    diphenhydrAMINE (BENADRYL) capsule 25 mg  25 mg Oral Q6H PRN    divalproex (DEPAKOTE SPRINKLE) capsule 250 mg  250 mg Oral BID    melatonin tablet 5 mg  5 mg Oral QHS PRN    calcium carbonate (TUMS) chewable tablet 200 mg [elemental]  200 mg Oral TID PRN    OLANZapine (ZyPREXA zydis) disintegrating tablet 5 mg  5 mg Oral DAILY    haloperidol lactate (HALDOL) injection 2 mg  2 mg IntraMUSCular BID PRN    LORazepam (ATIVAN) tablet 1 mg  1 mg Oral Q4H PRN    pantothenic ac-min oil-pet,hyd (AQUAPHOR) 41 % ointment   Topical PRN    nicotine (NICODERM CQ) 14 mg/24 hr patch 1 Patch  1 Patch TransDERmal Q24H    sodium chloride (NS) flush 5-40 mL  5-40 mL IntraVENous Q8H    sodium chloride (NS) flush 5-40 mL  5-40 mL IntraVENous PRN    ondansetron (ZOFRAN ODT) tablet 4 mg  4 mg Oral Q8H PRN    Or    ondansetron (ZOFRAN) injection 4 mg  4 mg IntraVENous Q6H PRN    enoxaparin (LOVENOX) injection 40 mg  40 mg SubCUTAneous DAILY       Signed:  Suma Carter MD    Part of this note may have been written by using a voice dictation software. The note has been proof read but may still contain some grammatical/other typographical errors.

## 2022-04-13 NOTE — PROGRESS NOTES
Constant Observer Yes - Name: Bhumi Serrano   Constant Observer Oriented YES   High risk patients are in line of sight at all times Yes   Excess equipment/medical supplies not necessary for the care of the patient removed Yes   All sharp or dangerous objects are removed from room: including but not limited to belts, pens & pencils, needles, medications, cosmetics, lighters, matches, nail files, watches, necklaces, glass objects, razors, razor blades, knives, aerosol sprays, drawstring pants, shoes, cords (telephone, call bells, etc.) cleaning wipes or other cleaning items, aluminum cans, not permanently attached wall décor Yes   Telephone/cell phone removed as well as TV remote (batteries can be swallowed) Yes   Patient belongings removed and labeled at nurses station Yes   Excess linen is removed from room Yes   All plastic bags are removed from the room and replaced with paper trash bags Yes   Patient is in paper scrubs or appropriate gown and using hospital socks with rubber soles Yes   No metal, hard eating utensils or hard plates are on meal tray Yes   Remove all cleaning agents used by [employer] Yes   If Crucifix is hanging on a nail, remove Crucifix as well as the nail No-Crucifix is fixed to wall. *If any question above is answered \"No,\" documentation is required.

## 2022-04-13 NOTE — PROGRESS NOTES
[address] [place] [address] heard a disturbance on floor. [address] [place] [address] offered to visit PT if helpful. PT was pacing floor. Sitter was at bedside. PT was agitated and combative initially. PT cursed [address] [place] [address] as [address] [place] [address] introduced self. PT apologized. PT shared her frustration about the restrictions placed upon her. PT shared PT misses being outside. PT calmed down and sat on bed.  offered caring spiritual presence and therapeutic communication. [address] [place] [address] asked PT what PT did like. PT stated several people and things dear to her. PT also expressed grief over the loss of these and became tearful. PT asked [address] [place] [address] to leave as [address] [place] [address] made PT \"sad' and PT wanted to be [place]. \" [address] [place] [address] left quickly offering additional support if needed. [address] [place] [address] checked on PT later in day. PT was sleeping d/t current medical regime. Rev. Hayder Reynoso, M.Div.

## 2022-04-13 NOTE — PROGRESS NOTES
Pt resting quietly in bed. Respirations even and unlabored on room air. No signs of distress noted at this time. Sitter is at the bedside and safety measures are in place. Will give report to oncoming RN.

## 2022-04-13 NOTE — PROGRESS NOTES
SBAR report received from [facility], [address] [place] [address]. Pt resting in bed; A&O x4. Pt found to be agitated upon assessment. Respirations even and unlabored on room air. Sitter is at the bedside. Safety measures are in place. Will continue to monitor.

## 2022-04-13 NOTE — PROGRESS NOTES
PROGRESS NOTE    Date of Service: 04/13/22        CC: Follow up. Niki Wong is a 46 y.o. female with a past psychiatric history of depression, PTSD, bipolar 1, panic attacks, psychosis, paranoia, methamphetamine abuse, polysubstance abuse, substance induced mood disorder. 4- - To room to meet with pt. Pt lying in bed asleep. Sitter at bedside. Will attempt to see pt tomorrow. IVC paperwork already renewed. Updated information as well as corrections sent to [facility] today / per staff at [facility] they received all information and they are waiting on MD to review. Will contact them again tomorrow to find out status.       Psychiatric Review Of Systems:  Sleep: stable  Appetite: unable to assess  Current suicidal/homicidal ideations: Unable to assess  Current auditory/visual hallucinations: Unable to assess    Medications:    Current Facility-Administered Medications:     diphenhydrAMINE (BENADRYL) capsule 25 mg, 25 mg, Oral, Q6H PRN, Barrett Nava MD, 25 mg at 04/13/22 0046    divalproex (DEPAKOTE SPRINKLE) capsule 250 mg, 250 mg, Oral, BID, Henrique Talamantes MD, 250 mg at 04/13/22 0846    melatonin tablet 5 mg, 5 mg, Oral, QHS PRN, Barrett Nava MD, 5 mg at 04/12/22 2233    calcium carbonate (TUMS) chewable tablet 200 mg [elemental], 200 mg, Oral, TID PRN, Maynor Morales MD, 200 mg at 04/10/22 0152    OLANZapine (ZyPREXA zydis) disintegrating tablet 5 mg, 5 mg, Oral, DAILY, Peggy Verma MD, 5 mg at 04/13/22 0846    haloperidol lactate (HALDOL) injection 2 mg, 2 mg, IntraMUSCular, BID PRN, Ameena Benz, DO, 2 mg at 04/12/22 1558    LORazepam (ATIVAN) tablet 1 mg, 1 mg, Oral, Q4H PRN, Ameena Benz C, DO, 1 mg at 04/13/22 1502    pantothenic ac-min oil-pet,hyd (AQUAPHOR) 41 % ointment, , Topical, PRN, Jo Iglesias DO, Given at 04/02/22 6843    nicotine (NICODERM CQ) 14 mg/24 hr patch 1 Patch, 1 Patch, TransDERmal, Q24H, Jesse Herrera MD, 1 Patch at 04/13/22 1503    sodium chloride (NS) flush 5-40 mL, 5-40 mL, IntraVENous, Q8H, Lizett Caal MD, 10 mL at 04/12/22 1341    sodium chloride (NS) flush 5-40 mL, 5-40 mL, IntraVENous, PRN, Arron Sam MD    ondansetron (ZOFRAN ODT) tablet 4 mg, 4 mg, Oral, Q8H PRN, 4 mg at 04/05/22 1215 **OR** ondansetron (ZOFRAN) injection 4 mg, 4 mg, IntraVENous, Q6H PRN, Arron Sam MD    enoxaparin (LOVENOX) injection 40 mg, 40 mg, SubCUTAneous, DAILY, Arron Sam MD, 40 mg at 04/11/22 0908    PMH:  Past Medical History:   Diagnosis Date    Ankle fracture, left     Psychiatric disorder     depression and axiety, no treatment at this time       Vitals:  Visit Vitals  /61 (BP 1 Location: Left upper arm, BP Patient Position: At rest)   Pulse 94   Temp 98.3 °F (36.8 °C)   Resp 20   Ht 5' 6\" (1.676 m)   Wt 148 lb 2.4 oz (67.2 kg)   SpO2 96%   BMI 23.91 kg/m²       ROS:  Unable to complete ROS r/t pt sleeping    Assessment:  Psychiatric Diagnoses:  Bipolar 1 disorder (HCC) [F31.9 (ICD-10-CM)], Lithium toxicity, intentional self-harm, initial encounter ([facility] [N67.364B (ICD-10-CM)], Marijuana abuse [F12.10 (ICD-10-CM)], Methamphetamine abuse ([facility] [F15.10 (ICD-10-CM)],  Psychosocial stressors [Z65.8 (ICD-10-CM)], Suicide attempt ([facility] [V49.03VA (ICD-10-CM)]      Plan:  Recommendation:  IVC paperwork renewed today  No changes to medications at this time  Plan to attempt to see pt tomorrow      Mary Mascorro PMHNP-BC  [address] [facility] [place]

## 2022-04-14 PROCEDURE — 74011250637 HC RX REV CODE- 250/637: Performed by: FAMILY MEDICINE

## 2022-04-14 PROCEDURE — 74011250637 HC RX REV CODE- 250/637: Performed by: STUDENT IN AN ORGANIZED HEALTH CARE EDUCATION/TRAINING PROGRAM

## 2022-04-14 PROCEDURE — 65270000029 HC RM PRIVATE

## 2022-04-14 PROCEDURE — 74011250637 HC RX REV CODE- 250/637: Performed by: HOSPITALIST

## 2022-04-14 PROCEDURE — 99233 SBSQ HOSP IP/OBS HIGH 50: CPT | Performed by: NURSE PRACTITIONER

## 2022-04-14 PROCEDURE — 74011250636 HC RX REV CODE- 250/636: Performed by: FAMILY MEDICINE

## 2022-04-14 PROCEDURE — 74011250637 HC RX REV CODE- 250/637: Performed by: INTERNAL MEDICINE

## 2022-04-14 PROCEDURE — 94760 N-INVAS EAR/PLS OXIMETRY 1: CPT

## 2022-04-14 RX ADMIN — DIVALPROEX SODIUM 250 MG: 125 CAPSULE, COATED PELLETS ORAL at 07:40

## 2022-04-14 RX ADMIN — Medication 5 MG: at 22:17

## 2022-04-14 RX ADMIN — LORAZEPAM 1 MG: 1 TABLET ORAL at 12:16

## 2022-04-14 RX ADMIN — ENOXAPARIN SODIUM 40 MG: 100 INJECTION SUBCUTANEOUS at 07:41

## 2022-04-14 RX ADMIN — OLANZAPINE 5 MG: 5 TABLET, ORALLY DISINTEGRATING ORAL at 07:41

## 2022-04-14 RX ADMIN — DIVALPROEX SODIUM 250 MG: 125 CAPSULE, COATED PELLETS ORAL at 18:00

## 2022-04-14 RX ADMIN — DIPHENHYDRAMINE HYDROCHLORIDE 25 MG: 25 CAPSULE ORAL at 22:17

## 2022-04-14 RX ADMIN — LORAZEPAM 1 MG: 1 TABLET ORAL at 15:39

## 2022-04-14 RX ADMIN — LORAZEPAM 1 MG: 1 TABLET ORAL at 22:17

## 2022-04-14 NOTE — PROGRESS NOTES
Comprehensive Nutrition Assessment    Type and Reason for Visit: Reassess    Nutrition Recommendations/Plan:    Continue current diet     Malnutrition Assessment:  Malnutrition Status: No malnutrition    Nutrition Assessment:   Nutrition History: Unable to assess. Wt hx per EMR pt has been wt stable. Nutrition Background: Pt with PMH notable for substance abuse and bipolar disorder. Admitted for intentional lithium overdose. Nutrition Interval:  Pt resting at time of visit. Discussed pt with RN who states pt is eating 100% of meals and denies any barriers to intake. Pt is pending placement at this time. Current Nutrition Therapies:  ADULT DIET Regular    Current Intake:   Average Meal Intake: % Average Supplement Intake: None ordered      Anthropometric Measures:  Height: 5' 6\" (167.6 cm)  Current Body Wt: 67.2 kg (148 lb 2.4 oz) (4/12), Weight source: Bed scale  BMI: 23.9, Normal weight (BMI 18.5-24. 9)  Admission Body Weight: 131 lb 9.8 oz (3/29; standing scale)  Ideal Body Weight (lbs) (Calculated): 130 lbs (59 kg), 109 %  Usual Body Wt: 63.5 kg (140 lb) (per EMR), Percent weight change: 1.3        Edema: No data recorded  Estimated Daily Nutrient Needs:  Energy (kcal/day): 1253-3097 (Kcal/kg (25-30), Weight Used: Current (64.3kg))  Protein (g/day): 51-64 (0.8-1gm/kg) Weight Used: (Current (64.3kg))  Fluid (ml/day):   (1 ml/kcal)    Nutrition Diagnosis:   No nutrition diagnosis at this time     Nutrition Interventions:   Food and/or Nutrient Delivery: Continue current diet     Coordination of Nutrition Care: No recommendation at this time  Plan of Care discussed with Sylvester Deras RN; IDT rounds    Goals:   Previous Goal Met: Goal(s) achieved  Active Goal: Continue to meet >80% estimated nutrition needs during admission.     Nutrition Monitoring and Evaluation:      Food/Nutrient Intake Outcomes: Food and nutrient intake  Physical Signs/Symptoms Outcomes: Meal time behavior    Discharge Planning:    No discharge needs at this time    Electronically signed by Sisi Alonso MS, RDN, LD 4/14/2022 at 10:41 AM  Contact: [phone]

## 2022-04-14 NOTE — PROGRESS NOTES
SBAR report received from [place], [facility]. Pt resting quietly in bed. Respirations even and unlabored on room air. Sitter is at the bedside. Suicide precautions and safety measures are in place. Will continue to monitor.

## 2022-04-14 NOTE — PROGRESS NOTES
Late entry: BSR received  Patient resting in bed with sitter present  RR even/unlabored on RA with NAD noted at this time

## 2022-04-14 NOTE — PROGRESS NOTES
Hospitalist Progress Note   Admit Date:  3/28/2022  6:49 PM   Name:  Angelika Ramírez   Age:  46 y.o. Sex:  female  :  1970   MRN:  [id number]   Room:  801/01    Presenting Complaint: Drug Overdose and Mental Health Problem    Reason(s) for Admission: Intentional lithium overdose [facility] Course & Interval History:   Angelika Ramírez is a 46 y.o. female with medical history of bipolar disorder admitted with reported intentional overdose of lithium. Patient apparently took an unknown amount of lithium and \"something else\" in an attempt to commit suicide. She has her Lithium bottle with her. The quality dispensed was 120, and there are currently 105 pills counted by ER nurse. UDS is positive for amphetamines and THC. Started on Fluid resuscitation. Has been seen multiple times by Tele psychiatry and Mac Perez NP with psychiatry and remains on IVC papers. Subjective/24hr Events (22): Exam bedside, resting in bed comfortably. No overnight events. Patient is not engaging in conversation and lying in bed with her eyes covered with a towel. No reported fever, diarrhea, chest pain, nausea or vomiting. ROS:  10 systems reviewed and negative except as noted above. Assessment & Plan:     #Intentional lithium overdose:  :  Reportedly suicidal  We will follow-up with psychiatrist recommendation for inpatient psych admission. Respiratory and renal status stable post overdose. CM/psych assisting in finding IP psych facility. #Substance abuse:  UDS positive for amphetamine and THC  Counseled    #Bipolar disorder:  Holding home meds for time being given intention overdose of lithium with elevated lithium level  Psychiatry consulted due to suicide attempt. 4/2 - trazadone qhs, prn ativan. Holding furhter lithium  4/5 - Pyschiatry recommeding addition of zyprexa 5mg daily. Added  4/6 - increase zyprexa, cont ativan prn.    Discontinued trazodone and started on Depakote. Decreased the dose of zyprexa    4/10 continue Depakote and Zyprexa  Reevaluated by Psychiatrist on 4/7 4/12 Increased dose of Depakote to 250mg BID  And s/p one dose of ativan  4/13: Continue Depakote to 250 mg twice daily      Discharge Planning: Inpatient psych and involuntary psych hold. Case management consulted. Patient w/o payor source and difficult disposition. Diet:  ADULT DIET Regular  DVT PPx: Lovenox  Code status: Full Code    Hospital Problems as of 4/14/2022 Never Reviewed          Codes Class Noted - Resolved POA    * (Principal) Intentional lithium overdose ([facility] ICD-10-CM: H34.161I  ICD-9-CM: 985.8, E950.9  3/29/2022 - Present Unknown        Bipolar 1 disorder, depressed, severe ([facility] ICD-10-CM: F31.4  ICD-9-CM: 296.53  11/27/2020 - Present Yes        History of methamphetamine abuse ([facility] ICD-10-CM: F15.11  ICD-9-CM: 305.73  1/26/2018 - Present Yes              Objective:     Patient Vitals for the past 24 hrs:   Temp Pulse Resp BP SpO2   04/14/22 0353 97.6 °F (36.4 °C) 84 16 (!) 105/59 95 %   04/13/22 2233 97.6 °F (36.4 °C) 90 18 119/84 98 %   04/13/22 1951 98.1 °F (36.7 °C) (!) 105 20 106/76 98 %   04/13/22 1452 98.3 °F (36.8 °C) 94 20 103/61 96 %   04/13/22 1113 98.4 °F (36.9 °C) 100 20 125/70 96 %   04/13/22 0731 98.2 °F (36.8 °C) 72 20 104/63 98 %     Oxygen Therapy  O2 Sat (%): 95 % (04/14/22 0353)  Pulse via Oximetry: 93 beats per minute (03/29/22 0329)  O2 Device: None (Room air) (04/14/22 4832)    Estimated body mass index is 23.91 kg/m² as calculated from the following:    Height as of this encounter: 5' 6\" (1.676 m). Weight as of this encounter: 67.2 kg (148 lb 2.4 oz). Intake/Output Summary (Last 24 hours) at 4/14/2022 0704  Last data filed at 4/13/2022 1332  Gross per 24 hour   Intake 600 ml   Output --   Net 600 ml         Physical Exam:     Blood pressure (!) 105/59, pulse 84, temperature 97.6 °F (36.4 °C), resp.  rate 16, height 5' 6\" (1.676 m), weight 67.2 kg (148 lb 2.4 oz), SpO2 95 %. General:    Alert, awake, NAD, on room air  HEENT:           head NCAT, PERRLA positive, MMM  Neck:  No restricted ROM. Trachea midline   CV:   RRR. No m/r/g. No jugular venous distension. Lungs:   CTAB. No wheezing, rhonchi, or rales. Respirations even, unlabored  Abdomen: Bowel sounds present. Soft, nontender, nondistended. Extremities: No cyanosis or clubbing. No edema  Skin:     No rashes and normal coloration. Warm and dry. Neuro:  CN II-XII grossly intact. Sensation intact. A&Ox3  Psych:  AOx3, agitated and restless    I have reviewed ordered lab tests and independently visualized imaging below:    Recent Labs:  Recent Results (from the past 48 hour(s))   CBC WITH AUTOMATED DIFF    Collection Time: 04/13/22  3:29 AM   Result Value Ref Range    WBC 8.7 4.3 - 11.1 K/uL    RBC 4.03 (L) 4.05 - 5.2 M/uL    HGB 11.6 (L) 11.7 - 15.4 g/dL    HCT 36.0 35.8 - 46.3 %    MCV 89.3 79.6 - 97.8 FL    MCH 28.8 26.1 - 32.9 PG    MCHC 32.2 31.4 - 35.0 g/dL    RDW 13.2 11.9 - 14.6 %    PLATELET 934 053 - 177 K/uL    MPV 11.4 9.4 - 12.3 FL    ABSOLUTE NRBC 0.00 0.0 - 0.2 K/uL    DF AUTOMATED      NEUTROPHILS 52 43 - 78 %    LYMPHOCYTES 37 13 - 44 %    MONOCYTES 8 4.0 - 12.0 %    EOSINOPHILS 3 0.5 - 7.8 %    BASOPHILS 1 0.0 - 2.0 %    IMMATURE GRANULOCYTES 1 0.0 - 5.0 %    ABS. NEUTROPHILS 4.5 1.7 - 8.2 K/UL    ABS. LYMPHOCYTES 3.2 0.5 - 4.6 K/UL    ABS. MONOCYTES 0.7 0.1 - 1.3 K/UL    ABS. EOSINOPHILS 0.3 0.0 - 0.8 K/UL    ABS. BASOPHILS 0.1 0.0 - 0.2 K/UL    ABS. IMM.  GRANS. 0.1 0.0 - 0.5 K/UL   METABOLIC PANEL, BASIC    Collection Time: 04/13/22  3:29 AM   Result Value Ref Range    Sodium 142 136 - 145 mmol/L    Potassium 4.1 3.5 - 5.1 mmol/L    Chloride 109 (H) 98 - 107 mmol/L    CO2 25 21 - 32 mmol/L    Anion gap 8 7 - 16 mmol/L    Glucose 102 (H) 65 - 100 mg/dL    BUN 20 6 - 23 MG/DL    Creatinine 0.50 (L) 0.6 - 1.0 MG/DL    GFR est AA >60 >60 ml/min/1.73m2    GFR est non-AA >60 >60 ml/min/1.73m2    Calcium 9.1 8.3 - 10.4 MG/DL   MAGNESIUM    Collection Time: 04/13/22  3:29 AM   Result Value Ref Range    Magnesium 2.1 1.8 - 2.4 mg/dL   PHOSPHORUS    Collection Time: 04/13/22  3:29 AM   Result Value Ref Range    Phosphorus 5.7 (H) 2.5 - 4.5 MG/DL       All Micro Results     None          Other Studies:  No results found. Current Meds:  Current Facility-Administered Medications   Medication Dose Route Frequency    diphenhydrAMINE (BENADRYL) capsule 25 mg  25 mg Oral Q6H PRN    divalproex (DEPAKOTE SPRINKLE) capsule 250 mg  250 mg Oral BID    melatonin tablet 5 mg  5 mg Oral QHS PRN    calcium carbonate (TUMS) chewable tablet 200 mg [elemental]  200 mg Oral TID PRN    OLANZapine (ZyPREXA zydis) disintegrating tablet 5 mg  5 mg Oral DAILY    haloperidol lactate (HALDOL) injection 2 mg  2 mg IntraMUSCular BID PRN    LORazepam (ATIVAN) tablet 1 mg  1 mg Oral Q4H PRN    pantothenic ac-min oil-pet,hyd (AQUAPHOR) 41 % ointment   Topical PRN    nicotine (NICODERM CQ) 14 mg/24 hr patch 1 Patch  1 Patch TransDERmal Q24H    sodium chloride (NS) flush 5-40 mL  5-40 mL IntraVENous Q8H    sodium chloride (NS) flush 5-40 mL  5-40 mL IntraVENous PRN    ondansetron (ZOFRAN ODT) tablet 4 mg  4 mg Oral Q8H PRN    Or    ondansetron (ZOFRAN) injection 4 mg  4 mg IntraVENous Q6H PRN    enoxaparin (LOVENOX) injection 40 mg  40 mg SubCUTAneous DAILY       Signed:  Eric Merida MD    Part of this note may have been written by using a voice dictation software. The note has been proof read but may still contain some grammatical/other typographical errors.

## 2022-04-14 NOTE — PROGRESS NOTES
Constant Observer Yes - Name: Carrie Lozano   Constant Observer Oriented YES   High risk patients are in line of sight at all times Yes   Excess equipment/medical supplies not necessary for the care of the patient removed Yes   All sharp or dangerous objects are removed from room: including but not limited to belts, pens & pencils, needles, medications, cosmetics, lighters, matches, nail files, watches, necklaces, glass objects, razors, razor blades, knives, aerosol sprays, drawstring pants, shoes, cords (telephone, call bells, etc.) cleaning wipes or other cleaning items, aluminum cans, not permanently attached wall décor Yes   Telephone/cell phone removed as well as TV remote (batteries can be swallowed) Yes   Patient belongings removed and labeled at nurses station Yes   Excess linen is removed from room Yes   All plastic bags are removed from the room and replaced with paper trash bags Yes   Patient is in paper scrubs or appropriate gown and using hospital socks with rubber soles Yes   No metal, hard eating utensils or hard plates are on meal tray Yes   Remove all cleaning agents used by [employer] Yes   If Crucifix is hanging on a nail, remove Crucifix as well as the nail No - Crucifix is fixed to the wall. *If any question above is answered \"No,\" documentation is required.
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
## 2022-04-14 NOTE — PROGRESS NOTES
Spoke with staff at [facility] this morning.   She states MD is reviewing patient screenings / she will call when they hear back from the MD.

## 2022-04-14 NOTE — PROGRESS NOTES
Pt resting quietly in bed with sitter at the bedside. Respirations even and unlabored on room air. No signs of distress noted at this time. Suicide precautions and safety measures are in place. Will give report to oncoming RN.

## 2022-04-15 LAB
COVID-19 RAPID TEST, COVR: NOT DETECTED
LITHIUM SERPL-SCNC: <0.2 MMOL/L (ref 0.6–1.2)
SARS-COV-2, COV2: NORMAL
SOURCE, COVRS: NORMAL

## 2022-04-15 PROCEDURE — 74011250637 HC RX REV CODE- 250/637: Performed by: FAMILY MEDICINE

## 2022-04-15 PROCEDURE — 36415 COLL VENOUS BLD VENIPUNCTURE: CPT

## 2022-04-15 PROCEDURE — 74011250636 HC RX REV CODE- 250/636: Performed by: FAMILY MEDICINE

## 2022-04-15 PROCEDURE — 74011250637 HC RX REV CODE- 250/637: Performed by: INTERNAL MEDICINE

## 2022-04-15 PROCEDURE — 65270000029 HC RM PRIVATE

## 2022-04-15 PROCEDURE — 80178 ASSAY OF LITHIUM: CPT

## 2022-04-15 PROCEDURE — 94760 N-INVAS EAR/PLS OXIMETRY 1: CPT

## 2022-04-15 PROCEDURE — 87635 SARS-COV-2 COVID-19 AMP PRB: CPT

## 2022-04-15 PROCEDURE — 74011250637 HC RX REV CODE- 250/637: Performed by: STUDENT IN AN ORGANIZED HEALTH CARE EDUCATION/TRAINING PROGRAM

## 2022-04-15 PROCEDURE — U0005 INFEC AGEN DETEC AMPLI PROBE: HCPCS

## 2022-04-15 RX ORDER — ACETAMINOPHEN 325 MG/1
650 TABLET ORAL
Status: DISCONTINUED | OUTPATIENT
Start: 2022-04-15 | End: 2022-04-19 | Stop reason: HOSPADM

## 2022-04-15 RX ORDER — IBUPROFEN 200 MG
1 TABLET ORAL EVERY 24 HOURS
Status: DISCONTINUED | OUTPATIENT
Start: 2022-04-16 | End: 2022-04-15

## 2022-04-15 RX ORDER — IBUPROFEN 200 MG
1 TABLET ORAL EVERY 24 HOURS
Status: DISCONTINUED | OUTPATIENT
Start: 2022-04-15 | End: 2022-04-19 | Stop reason: HOSPADM

## 2022-04-15 RX ORDER — PROMETHAZINE HYDROCHLORIDE 25 MG/1
25 TABLET ORAL
Status: DISCONTINUED | OUTPATIENT
Start: 2022-04-15 | End: 2022-04-16

## 2022-04-15 RX ADMIN — OLANZAPINE 5 MG: 5 TABLET, ORALLY DISINTEGRATING ORAL at 08:56

## 2022-04-15 RX ADMIN — LORAZEPAM 1 MG: 1 TABLET ORAL at 15:46

## 2022-04-15 RX ADMIN — ACETAMINOPHEN 650 MG: 325 TABLET ORAL at 20:16

## 2022-04-15 RX ADMIN — ENOXAPARIN SODIUM 40 MG: 100 INJECTION SUBCUTANEOUS at 08:55

## 2022-04-15 RX ADMIN — LORAZEPAM 1 MG: 1 TABLET ORAL at 09:00

## 2022-04-15 RX ADMIN — DIVALPROEX SODIUM 250 MG: 125 CAPSULE, COATED PELLETS ORAL at 18:00

## 2022-04-15 RX ADMIN — DIVALPROEX SODIUM 250 MG: 125 CAPSULE, COATED PELLETS ORAL at 08:56

## 2022-04-15 RX ADMIN — PROMETHAZINE HYDROCHLORIDE 25 MG: 25 TABLET ORAL at 22:58

## 2022-04-15 RX ADMIN — ONDANSETRON 4 MG: 4 TABLET, ORALLY DISINTEGRATING ORAL at 20:21

## 2022-04-15 NOTE — PROGRESS NOTES
SBAR report received from [facility], [facility]. Pt up ad altaf with sitter at the bedside; A&O x4. Respirations even and unlabored on room air. Pt denies pain. No signs of distress noted. Suicide precautions and safety measures are in place.

## 2022-04-15 NOTE — PROGRESS NOTES
Constant Observer Yes - Name: Reema Bray Observer Oriented YES   High risk patients are in line of sight at all times Yes   Excess equipment/medical supplies not necessary for the care of the patient removed Yes   All sharp or dangerous objects are removed from room: including but not limited to belts, pens & pencils, needles, medications, cosmetics, lighters, matches, nail files, watches, necklaces, glass objects, razors, razor blades, knives, aerosol sprays, drawstring pants, shoes, cords (telephone, call bells, etc.) cleaning wipes or other cleaning items, aluminum cans, not permanently attached wall décor Yes   Telephone/cell phone removed as well as TV remote (batteries can be swallowed) Yes   Patient belongings removed and labeled at nurses station Yes   Excess linen is removed from room Yes   All plastic bags are removed from the room and replaced with paper trash bags Yes   Patient is in paper scrubs or appropriate gown and using hospital socks with rubber soles Yes   No metal, hard eating utensils or hard plates are on meal tray Yes   Remove all cleaning agents used by Iftikhar's Yes   If Crucifix is hanging on a nail, remove Crucifix as well as the nail No - Crucifix is fixed to the wall. *If any question above is answered \"No,\" documentation is required.

## 2022-04-15 NOTE — PROGRESS NOTES
Hospitalist Progress Note   Admit Date:  3/28/2022  6:49 PM   Name:  Bryanna Fernandez   Age:  46 y.o. Sex:  female  :  1970   MRN:  [id number]   Room:  [facility]/    Presenting Complaint: Drug Overdose and Mental Health Problem    Reason(s) for Admission: Intentional lithium overdose [facility] Course & Interval History:   Bryanna Fernandez is a 46 y.o. female with medical history of bipolar disorder admitted with reported intentional overdose of lithium. Patient apparently took an unknown amount of lithium and \"something else\" in an attempt to commit suicide. She has her Lithium bottle with her. The quality dispensed was 120, and there are currently 105 pills counted by ER nurse. UDS is positive for amphetamines and THC. Started on Fluid resuscitation. Has been seen multiple times by Tele psychiatry and Cade Mireles, NP with psychiatry and remains on IVC papers. Subjective/24hr Events (04/15/22): Patient seen at bedside, resting in bed comfortably. Patient is asking if she can go out under supervision as she is feeling claustrophobic being in the room all day for past 17 days. No reported fever, nausea vomiting, chest pain, shortness of breath. No episodes of agitation or restlessness. Patient does appear frustrated at baseline. ROS:  10 systems reviewed and negative except as noted above. Assessment & Plan:     #Intentional lithium overdose:  4/15:  Reportedly suicidal  We will follow-up with psychiatrist recommendation for inpatient psych admission. Respiratory and renal status stable post overdose. CM/psych assisting in finding IP psych facility. #Substance abuse:  UDS positive for amphetamine and THC  Counseled    #Bipolar disorder:  Holding home meds for time being given intention overdose of lithium with elevated lithium level  Psychiatry consulted due to suicide attempt. / - trazadone qhs, prn ativan.  Holding furhter lithium   - Pyschiatry recommeding addition of zyprexa 5mg daily. Added  4/6 - increase zyprexa, cont ativan prn. Discontinued trazodone and started on Depakote. Decreased the dose of zyprexa    4/10 continue Depakote and Zyprexa  Reevaluated by Psychiatrist on 4/7 4/12 Increased dose of Depakote to 250mg BID  And s/p one dose of ativan  4/13: Continue Depakote to 250 mg twice daily      Discharge Planning: Inpatient psych and involuntary psych hold. Case management consulted. Patient w/o payor source and difficult disposition. Diet:  ADULT DIET Regular  DVT PPx: Lovenox  Code status: Full Code    Hospital Problems as of 4/15/2022 Never Reviewed          Codes Class Noted - Resolved POA    * (Principal) Intentional lithium overdose ([facility] ICD-10-CM: O56.540N  ICD-9-CM: 985.8, E950.9  3/29/2022 - Present Unknown        Bipolar 1 disorder, depressed, severe ([facility] ICD-10-CM: F31.4  ICD-9-CM: 296.53  11/27/2020 - Present Yes        History of methamphetamine abuse ([facility] ICD-10-CM: F15.11  ICD-9-CM: 305.73  1/26/2018 - Present Yes              Objective:     Patient Vitals for the past 24 hrs:   Temp Pulse Resp BP SpO2   04/14/22 2224 98.5 °F (36.9 °C) 95 20 129/89 97 %   04/14/22 2058 98.6 °F (37 °C) 87 22 121/72 97 %   04/14/22 1552 99 °F (37.2 °C) 99 19 113/68 97 %   04/14/22 1111 -- 88 18 (!) 106/56 96 %     Oxygen Therapy  O2 Sat (%): 97 % (04/14/22 2224)  Pulse via Oximetry: 93 beats per minute (03/29/22 0329)  O2 Device: None (Room air) (04/15/22 0310)    Estimated body mass index is 23.91 kg/m² as calculated from the following:    Height as of this encounter: 5' 6\" (1.676 m). Weight as of this encounter: 67.2 kg (148 lb 2.4 oz). Intake/Output Summary (Last 24 hours) at 4/15/2022 0734  Last data filed at 4/14/2022 0837  Gross per 24 hour   Intake 360 ml   Output --   Net 360 ml         Physical Exam:     Blood pressure 129/89, pulse 95, temperature 98.5 °F (36.9 °C), resp.  rate 20, height 5' 6\" (1.676 m), weight 67.2 kg (148 lb 2.4 oz), SpO2 97 %. General:    Alert, awake, NAD, on room air  HEENT:           head NCAT, PERRLA positive, MMM  Neck:  No restricted ROM. Trachea midline   CV:   RRR. No m/r/g. No jugular venous distension. Lungs:   CTAB. No wheezing, rhonchi, or rales. Respirations even, unlabored  Abdomen: Bowel sounds present. Soft, nontender, nondistended. Extremities: No cyanosis or clubbing. No edema  Skin:     No rashes and normal coloration. Warm and dry. Neuro:  CN II-XII grossly intact. Sensation intact. A&Ox3  Psych:  AOx3, agitated and restless    I have reviewed ordered lab tests and independently visualized imaging below:    Recent Labs:  No results found for this or any previous visit (from the past 48 hour(s)). All Micro Results     None          Other Studies:  No results found.     Current Meds:  Current Facility-Administered Medications   Medication Dose Route Frequency    diphenhydrAMINE (BENADRYL) capsule 25 mg  25 mg Oral Q6H PRN    divalproex (DEPAKOTE SPRINKLE) capsule 250 mg  250 mg Oral BID    melatonin tablet 5 mg  5 mg Oral QHS PRN    calcium carbonate (TUMS) chewable tablet 200 mg [elemental]  200 mg Oral TID PRN    OLANZapine (ZyPREXA zydis) disintegrating tablet 5 mg  5 mg Oral DAILY    haloperidol lactate (HALDOL) injection 2 mg  2 mg IntraMUSCular BID PRN    LORazepam (ATIVAN) tablet 1 mg  1 mg Oral Q4H PRN    pantothenic ac-min oil-pet,hyd (AQUAPHOR) 41 % ointment   Topical PRN    nicotine (NICODERM CQ) 14 mg/24 hr patch 1 Patch  1 Patch TransDERmal Q24H    sodium chloride (NS) flush 5-40 mL  5-40 mL IntraVENous Q8H    sodium chloride (NS) flush 5-40 mL  5-40 mL IntraVENous PRN    ondansetron (ZOFRAN ODT) tablet 4 mg  4 mg Oral Q8H PRN    Or    ondansetron (ZOFRAN) injection 4 mg  4 mg IntraVENous Q6H PRN    enoxaparin (LOVENOX) injection 40 mg  40 mg SubCUTAneous DAILY       Signed:  Eric Merida MD    Part of this note may have been written by using a voice dictation software. The note has been proof read but may still contain some grammatical/other typographical errors.

## 2022-04-15 NOTE — PROGRESS NOTES
Spoke with intake at [facility] this morning. Per intake, their MD did review the chart. She has not spoken with the MD this morning but will contact me with any updates.

## 2022-04-15 NOTE — PROGRESS NOTES
MSN, CM:  Patient ordered COVID test today. Patient with possible admission on Saturday with New papers needed on Saturday. Case Management will continue to follow.

## 2022-04-15 NOTE — PROGRESS NOTES
Pt resting quietly in bed with sitter present. Respirations even and unlabored on room air. No signs of distress noted at this time. SBAR report given to PATTY Garcia.

## 2022-04-16 LAB
SARS-COV-2, COV2: NOT DETECTED
SPECIMEN SOURCE, FCOV2M: NORMAL

## 2022-04-16 PROCEDURE — 74011250637 HC RX REV CODE- 250/637: Performed by: FAMILY MEDICINE

## 2022-04-16 PROCEDURE — 74011250636 HC RX REV CODE- 250/636: Performed by: FAMILY MEDICINE

## 2022-04-16 PROCEDURE — 74011250637 HC RX REV CODE- 250/637: Performed by: INTERNAL MEDICINE

## 2022-04-16 PROCEDURE — 74011250637 HC RX REV CODE- 250/637: Performed by: STUDENT IN AN ORGANIZED HEALTH CARE EDUCATION/TRAINING PROGRAM

## 2022-04-16 PROCEDURE — 74011250637 HC RX REV CODE- 250/637: Performed by: HOSPITALIST

## 2022-04-16 PROCEDURE — 65270000029 HC RM PRIVATE

## 2022-04-16 RX ORDER — LOPERAMIDE HYDROCHLORIDE 2 MG/1
2 CAPSULE ORAL
Status: DISCONTINUED | OUTPATIENT
Start: 2022-04-16 | End: 2022-04-19 | Stop reason: HOSPADM

## 2022-04-16 RX ORDER — SAME BUTANEDISULFONATE/BETAINE 400-600 MG
500 POWDER IN PACKET (EA) ORAL 2 TIMES DAILY
Status: DISCONTINUED | OUTPATIENT
Start: 2022-04-16 | End: 2022-04-19 | Stop reason: HOSPADM

## 2022-04-16 RX ORDER — PROMETHAZINE HYDROCHLORIDE 25 MG/1
25 TABLET ORAL
Status: DISCONTINUED | OUTPATIENT
Start: 2022-04-16 | End: 2022-04-19 | Stop reason: HOSPADM

## 2022-04-16 RX ADMIN — DIPHENHYDRAMINE HYDROCHLORIDE 25 MG: 25 CAPSULE ORAL at 00:45

## 2022-04-16 RX ADMIN — LORAZEPAM 1 MG: 1 TABLET ORAL at 18:27

## 2022-04-16 RX ADMIN — Medication 500 MG: at 17:05

## 2022-04-16 RX ADMIN — ENOXAPARIN SODIUM 40 MG: 100 INJECTION SUBCUTANEOUS at 08:20

## 2022-04-16 RX ADMIN — DIVALPROEX SODIUM 250 MG: 125 CAPSULE, COATED PELLETS ORAL at 08:20

## 2022-04-16 RX ADMIN — LOPERAMIDE HYDROCHLORIDE 2 MG: 2 CAPSULE ORAL at 21:08

## 2022-04-16 RX ADMIN — ONDANSETRON 4 MG: 4 TABLET, ORALLY DISINTEGRATING ORAL at 06:27

## 2022-04-16 RX ADMIN — Medication 5 MG: at 21:11

## 2022-04-16 RX ADMIN — DIVALPROEX SODIUM 250 MG: 125 CAPSULE, COATED PELLETS ORAL at 17:05

## 2022-04-16 RX ADMIN — LORAZEPAM 1 MG: 1 TABLET ORAL at 00:45

## 2022-04-16 RX ADMIN — OLANZAPINE 5 MG: 5 TABLET, ORALLY DISINTEGRATING ORAL at 08:20

## 2022-04-16 RX ADMIN — PROMETHAZINE HYDROCHLORIDE 25 MG: 25 TABLET ORAL at 16:03

## 2022-04-16 NOTE — PROGRESS NOTES
Care Management Interventions  PCP Verified by CM: Yes  Mode of Transport at Discharge: Other (see comment)  Transition of Care Consult (CM Consult): Discharge Planning  Discharge Durable Medical Equipment: No  Physical Therapy Consult: No  Occupational Therapy Consult: No  Speech Therapy Consult: No  Support Systems: Spouse/Significant Other,Friend/Neighbor  Confirm Follow Up Transport: Self  The Plan for Transition of Care is Related to the Following Treatment Goals : Return home and back to her baseline  Discharge Location  Patient Expects to be Discharged to[de-identified] Unable to determine at this time        Lithium labs and PCR COVID faxed to [facility].

## 2022-04-16 NOTE — PROGRESS NOTES
Patient resting quietly in bed. Sitter present in room. Respirations even and unlabored. On room air. No signs of distress. No needs expressed. Report given to PATTY Garcia.

## 2022-04-16 NOTE — PROGRESS NOTES
Hospitalist Progress Note   Admit Date:  3/28/2022  6:49 PM   Name:  Sammy Loomis   Age:  46 y.o. Sex:  female  :  1970   MRN:  [id number]   Room:  801/01    Presenting Complaint: Drug Overdose and Mental Health Problem    Reason(s) for Admission: Intentional lithium overdose [facility] Course & Interval History:   Sammy Loomis is a 46 y.o. female with medical history of bipolar disorder admitted with reported intentional overdose of lithium. Patient apparently took an unknown amount of lithium and \"something else\" in an attempt to commit suicide. She has her Lithium bottle with her. The quality dispensed was 120, and there are currently 105 pills counted by ER nurse. UDS is positive for amphetamines and THC. Started on Fluid resuscitation. Has been seen multiple times by Tele psychiatry and Miguelito Martinez NP with psychiatry and remains on IVC papers. Subjective/24hr Events (22):  Pt c/o loose stools and vomiting. Denies abdominal pain, fever, chest pain, SOB.      ROS:  10 systems reviewed and negative except as noted above. Assessment & Plan:     # Acute Diarrhea:  :  Stool is not consistent with C.diff  Started on florastor 500 mg po bid    #Intentional lithium overdose:  :  Reportedly suicidal  We will follow-up with psychiatrist recommendation for inpatient psych admission. Respiratory and renal status stable post overdose. CM/psych assisting in finding IP psych facility. #Substance abuse:  UDS positive for amphetamine and THC  Counseled    #Bipolar disorder:  Holding home meds for time being given intention overdose of lithium with elevated lithium level  Psychiatry consulted due to suicide attempt. 4/2 - trazadone qhs, prn ativan. Holding furhter lithium  4/5 - Pyschiatry recommeding addition of zyprexa 5mg daily. Added  4/6 - increase zyprexa, cont ativan prn. Discontinued trazodone and started on Depakote.  Decreased the dose of zyprexa    4/10 continue Depakote and Zyprexa  Reevaluated by Psychiatrist on 4/7 4/12 Increased dose of Depakote to 250mg BID  And s/p one dose of ativan  4/13: Continue Depakote to 250 mg twice daily      Discharge Planning: Inpatient psych and involuntary psych hold. Case management consulted. Patient w/o payor source and difficult disposition. Diet:  ADULT DIET Regular  DVT PPx: Lovenox  Code status: Full Code    Hospital Problems as of 4/16/2022 Never Reviewed          Codes Class Noted - Resolved POA    * (Principal) Intentional lithium overdose ([facility] ICD-10-CM: T24.140I  ICD-9-CM: 985.8, E950.9  3/29/2022 - Present Unknown        Bipolar 1 disorder, depressed, severe ([facility] ICD-10-CM: F31.4  ICD-9-CM: 296.53  11/27/2020 - Present Yes        History of methamphetamine abuse ([facility] ICD-10-CM: F15.11  ICD-9-CM: 305.73  1/26/2018 - Present Yes              Objective:     Patient Vitals for the past 24 hrs:   Temp Pulse Resp BP SpO2   04/16/22 0709 98 °F (36.7 °C) 94 19 103/63 97 %   04/16/22 0305 98.6 °F (37 °C) (!) 106 20 138/83 96 %   04/15/22 2335 97.8 °F (36.6 °C) 92 18 123/79 97 %   04/15/22 1922 97.8 °F (36.6 °C) 89 18 116/72 96 %   04/15/22 0754 -- 85 19 107/62 95 %     Oxygen Therapy  O2 Sat (%): 97 % (04/16/22 0709)  Pulse via Oximetry: 93 beats per minute (03/29/22 0329)  O2 Device: None (Room air) (04/16/22 0313)    Estimated body mass index is 23.91 kg/m² as calculated from the following:    Height as of this encounter: 5' 6\" (1.676 m). Weight as of this encounter: 67.2 kg (148 lb 2.4 oz). Intake/Output Summary (Last 24 hours) at 4/16/2022 0724  Last data filed at 4/15/2022 2209  Gross per 24 hour   Intake 200 ml   Output --   Net 200 ml         Physical Exam:     Blood pressure 103/63, pulse 94, temperature 98 °F (36.7 °C), resp. rate 19, height 5' 6\" (1.676 m), weight 67.2 kg (148 lb 2.4 oz), SpO2 97 %.   General:    Alert, awake, NAD, on room air  HEENT:           head NCAT, PERRLA positive, MMM  Neck:  No restricted ROM. Trachea midline   CV:   RRR. No m/r/g. No jugular venous distension. Lungs:   CTAB. No wheezing, rhonchi, or rales. Respirations even, unlabored  Abdomen: Bowel sounds present. Soft, nontender, nondistended. Extremities: No cyanosis or clubbing. No edema  Skin:     No rashes and normal coloration. Warm and dry. Neuro:  gcs 15, no motor or sensory deficits, CN 2-12 intact  Psych:  AOx3    I have reviewed ordered lab tests and independently visualized imaging below:    Recent Labs:  Recent Results (from the past 48 hour(s))   COVID-19 RAPID TEST    Collection Time: 04/15/22 12:14 PM   Result Value Ref Range    Specimen source NASAL      COVID-19 rapid test Not detected NOTD     LITHIUM    Collection Time: 04/15/22  3:33 PM   Result Value Ref Range    Lithium level <0.20 (L) 0.60 - 1.20 MMOL/L   SARS-COV-2    Collection Time: 04/15/22  5:37 PM   Result Value Ref Range    SARS-CoV-2 by PCR Please find results under separate order         All Micro Results     Procedure Component Value Units Date/Time    C. DIFFICILE AG & TOXIN A/B [[id number]]     Order Status: Sent Specimen: Stool     SARS-COV-2, PCR [[id number]] Collected: 04/15/22 1737    Order Status: Completed Updated: 04/15/22 1822    COVID-19 RAPID TEST [[id number]] Collected: 04/15/22 1214    Order Status: Completed Specimen: Nasopharyngeal Updated: 04/15/22 1311     Specimen source NASAL        COVID-19 rapid test Not detected        Comment:      The specimen is NEGATIVE for SARS-CoV-2, the novel coronavirus associated with COVID-19. A negative result does not rule out COVID-19. This test has been authorized by the FDA under an Emergency Use Authorization (EUA) for use by authorized laboratories.         Fact sheet for Healthcare Providers: ConventionUpdate.co.nz  Fact sheet for Patients: ConventionUpdate.co.nz       Methodology: Isothermal Nucleic Acid Amplification               Other Studies:  No results found. Current Meds:  Current Facility-Administered Medications   Medication Dose Route Frequency    acetaminophen (TYLENOL) tablet 650 mg  650 mg Oral Q6H PRN    nicotine (NICODERM CQ) 21 mg/24 hr patch 1 Patch  1 Patch TransDERmal Q24H    promethazine (PHENERGAN) tablet 25 mg  25 mg Oral BID PRN    diphenhydrAMINE (BENADRYL) capsule 25 mg  25 mg Oral Q6H PRN    divalproex (DEPAKOTE SPRINKLE) capsule 250 mg  250 mg Oral BID    melatonin tablet 5 mg  5 mg Oral QHS PRN    calcium carbonate (TUMS) chewable tablet 200 mg [elemental]  200 mg Oral TID PRN    OLANZapine (ZyPREXA zydis) disintegrating tablet 5 mg  5 mg Oral DAILY    haloperidol lactate (HALDOL) injection 2 mg  2 mg IntraMUSCular BID PRN    LORazepam (ATIVAN) tablet 1 mg  1 mg Oral Q4H PRN    pantothenic ac-min oil-pet,hyd (AQUAPHOR) 41 % ointment   Topical PRN    sodium chloride (NS) flush 5-40 mL  5-40 mL IntraVENous Q8H    sodium chloride (NS) flush 5-40 mL  5-40 mL IntraVENous PRN    ondansetron (ZOFRAN ODT) tablet 4 mg  4 mg Oral Q8H PRN    Or    ondansetron (ZOFRAN) injection 4 mg  4 mg IntraVENous Q6H PRN    enoxaparin (LOVENOX) injection 40 mg  40 mg SubCUTAneous DAILY       Signed:  Scottie Byrne MD    Part of this note may have been written by using a voice dictation software. The note has been proof read but may still contain some grammatical/other typographical errors.

## 2022-04-16 NOTE — PROGRESS NOTES
Received report from [facility] [place]. Patient awake and in bed. Sitter at bedside. A&O x4. Respirations present. On room air. No signs of distress. No needs expressed. Bed low and locked. Call light within reach. Will continue to monitor.

## 2022-04-16 NOTE — PROGRESS NOTES
Late entry:  Patient informed this RN about D/N/V with foul smelling belching  DR. David Sarkar made aware of additional symptoms

## 2022-04-16 NOTE — PROGRESS NOTES
C-diff sample collected and sent to lab. Patient answers are not available for this visit.    CHIEF COMPLAINT  Hospital Follow Up      HISTORY OF PRESENT ILLNESS    Problem List Items Addressed This Visit     Elevated bilirubin    Current Assessment & Plan     --------------------------------------------------------------------------------  RELEVANT DATA REVIEWED:  Lab Results   Component Value Date    AST 28 04/22/2018    AST 27 03/23/2018    AST 26 03/20/2018    ALT 43 04/22/2018    ALT 37 03/23/2018    ALT 37 03/20/2018    ALKPHOS 83 04/22/2018    ALKPHOS 92 03/23/2018    ALKPHOS 93 03/20/2018    BILITOT 1.4 (H) 04/22/2018    BILITOT 1.6 (H) 03/23/2018    BILITOT 1.8 (H) 03/20/2018    BILIDIR 0.6 (H) 03/23/2018    ALBUMIN 3.7 04/22/2018    ALBUMIN 3.7 04/09/2018    ALBUMIN 3.4 (L) 03/23/2018    LABPROT 10.9 07/31/2017    INR 1.1 07/31/2017    APTT 29.4 08/02/2017    APTT 64.8 (H) 08/01/2017    APTT 66.7 (H) 08/01/2017       This condition appears to be IMPROVED.   --------------------------------------------------------------------------------          Type 2 diabetes mellitus with diabetic nephropathy    Current Assessment & Plan     --------------------------------------------------------------------------------  RELEVANT DATA REVIEWED:  Lab Results   Component Value Date    HGBA1C 5.6 04/23/2018    HGBA1C 5.3 03/20/2018    HGBA1C 6.0 (H) 07/31/2017    ESTGFRAFRICA >60 04/24/2018    EGFRNONAA >60 04/24/2018     BP Readings from Last 6 Encounters:   04/26/18 (!) 136/94   04/24/18 (!) 145/80   04/16/18 (!) 150/102   04/03/18 (!) 142/98   03/23/18 136/86   02/06/18 134/82      Wt Readings from Last 6 Encounters:   04/26/18 107.3 kg (236 lb 8.9 oz)   04/22/18 110 kg (242 lb 8.1 oz)   04/16/18 111.4 kg (245 lb 9.5 oz)   04/03/18 110 kg (242 lb 8.1 oz)   03/23/18 109.2 kg (240 lb 11.9 oz)   02/06/18 108.1 kg (238 lb 5.1 oz)      BMI Readings from Last 6 Encounters:   04/26/18 29.57 kg/m²   04/22/18 30.31 kg/m²   04/16/18 30.70 kg/m²   04/03/18 30.31 kg/m²    03/23/18 30.09 kg/m²   02/06/18 29.79 kg/m²     HEALTH MAINTENANCE: Diabetic health maintenance interventions reviewed and are up to date except for:  There are no preventive care reminders to display for this patient.    The ASCVD Risk score (Ayana HORN Jr., et al., 2013) failed to calculate for the following reasons:    The 2013 ASCVD risk score is only valid for ages 40 to 79    The patient has a prior MI or stroke diagnosis     This condition appears to be WELL CONTROLLED. This condition appears to be STABLE.   --------------------------------------------------------------------------------          Status post angioplasty with stent    Essential hypertension (Chronic)    Current Assessment & Plan     --------------------------------------------------------------------------------  RELEVANT DATA REVIEWED:  BP Readings from Last 6 Encounters:   04/26/18 138/88   04/24/18 (!) 145/80   04/16/18 (!) 150/102   04/03/18 (!) 142/98   03/23/18 136/86   02/06/18 134/82     Wt Readings from Last 6 Encounters:   04/26/18 107.3 kg (236 lb 8.9 oz)   04/22/18 110 kg (242 lb 8.1 oz)   04/16/18 111.4 kg (245 lb 9.5 oz)   04/03/18 110 kg (242 lb 8.1 oz)   03/23/18 109.2 kg (240 lb 11.9 oz)   02/06/18 108.1 kg (238 lb 5.1 oz)     BMI Readings from Last 6 Encounters:   04/26/18 29.57 kg/m²   04/22/18 30.31 kg/m²   04/16/18 30.70 kg/m²   04/03/18 30.31 kg/m²   03/23/18 30.09 kg/m²   02/06/18 29.79 kg/m²     Lab Results   Component Value Date    ESTGFRAFRICA >60 04/24/2018    EGFRNONAA >60 04/24/2018    CREATININE 1.2 04/24/2018    BUN 16 04/24/2018       The ASCVD Risk score (Ayana HORN Jr., et al., 2013) failed to calculate for the following reasons:    The 2013 ASCVD risk score is only valid for ages 40 to 79    The patient has a prior MI or stroke diagnosis     This condition appears to be FAIRLY CONTROLLED. This condition appears to be IMPROVED.   --------------------------------------------------------------------------------            Relevant Orders    Hypertension Digital Medicine ([facility]) Enrollment Order (Completed)    Hypertension Digital Medicine ([facility]): Assign Onboarding Questionnaires (Completed)    Gout of left knee (Chronic)    Current Assessment & Plan     --------------------------------------------------------------------------------  RELEVANT DATA REVIEWED:  Wt Readings from Last 6 Encounters:   04/26/18 107.3 kg (236 lb 8.9 oz)   04/22/18 110 kg (242 lb 8.1 oz)   04/16/18 111.4 kg (245 lb 9.5 oz)   04/03/18 110 kg (242 lb 8.1 oz)   03/23/18 109.2 kg (240 lb 11.9 oz)   02/06/18 108.1 kg (238 lb 5.1 oz)     Lab Results   Component Value Date    URICACID 6.9 03/20/2018    URICACID 6.7 12/15/2017    URICACID 7.1 (H) 09/13/2017    ESTGFRAFRICA >60 04/24/2018    EGFRNONAA >60 04/24/2018    CREATININE 1.2 04/24/2018    BUN 16 04/24/2018     --------------------------------------------------------------------------------          Hyperlipidemia (Chronic)    Current Assessment & Plan     --------------------------------------------------------------------------------  RELEVANT DATA REVIEWED:  Wt Readings from Last 6 Encounters:   04/26/18 107.3 kg (236 lb 8.9 oz)   04/22/18 110 kg (242 lb 8.1 oz)   04/16/18 111.4 kg (245 lb 9.5 oz)   04/03/18 110 kg (242 lb 8.1 oz)   03/23/18 109.2 kg (240 lb 11.9 oz)   02/06/18 108.1 kg (238 lb 5.1 oz)     BMI Readings from Last 6 Encounters:   04/26/18 29.57 kg/m²   04/22/18 30.31 kg/m²   04/16/18 30.70 kg/m²   04/03/18 30.31 kg/m²   03/23/18 30.09 kg/m²   02/06/18 29.79 kg/m²     Lab Results   Component Value Date    CHOL 139 04/23/2018    CHOL 119 (L) 11/15/2017    CHOL 245 (H) 07/05/2017    TRIG 175 (H) 04/23/2018    TRIG 204 (H) 11/15/2017    TRIG 245 (H) 07/05/2017    HDL 31 (L) 04/23/2018    HDL 24 (L) 11/15/2017    HDL 29 (L) 07/05/2017    LDLCALC 73.0 04/23/2018    LDLCALC 54.2 (L) 11/15/2017    LDLCALC 167.0 (H) 07/05/2017    NONHDLCHOL 108 04/23/2018    NONHDLCHOL 95 11/15/2017     NONHDLCHOL 216 07/05/2017    AST 28 04/22/2018    ALT 43 04/22/2018       The ASCVD Risk score (Ayanamiguel HORN Jr., et al., 2013) failed to calculate for the following reasons:    The 2013 ASCVD risk score is only valid for ages 40 to 79    The patient has a prior MI or stroke diagnosis     He appears to be tolerating statin for dyslipidemia without apparent symptoms of myositis or hepatic dysfunction.   --------------------------------------------------------------------------------          Coronary artery disease involving native coronary artery of native heart without angina pectoris - Primary (Chronic)    Overview     Cath lab procedure 04/23/2018 (Naveen Rios MD)   A. Indication/Pre-Operative Diagnosis: The patient is a 36 year old male that was referred for catheterization by Aaareferral Self for ACS (NSTEMI). The BELLA risk score is 5.      B. Summary/Post-Operative Diagnosis   1. Single vessel coronary artery disease.   2. Normal LVEF.   3. Diastolic dysfunction.   4. Successful PCI for acute myocardial infarction.   5. The patient did not undergo primary PCI.         Current Assessment & Plan     INTERVAL UPDATE:  Cath lab procedure 04/23/2018 (Naveen Rios MD)  A. Indication/Pre-Operative Diagnosis: The patient is a 36 year old male that was referred for catheterization by Aaareferral Self for ACS (NSTEMI). The BELLA risk score is 5.    B. Summary/Post-Operative Diagnosis  1. Single vessel coronary artery disease.  2. Normal LVEF.  3. Diastolic dysfunction.  4. Successful PCI for acute myocardial infarction.  5. The patient did not undergo primary PCI.    NOTE:He reports no angina symptoms, but he says that he was told he would receive a prescription for nitroglycerin after hospital discharge to take as needed in case of angina. He says he did not receive this prescription. I provided him a prescription for nitroglycerin, and I told him to discuss this further with his cardiologist.          Relevant  Medications    nitroGLYCERIN (NITROSTAT) 0.4 MG SL tablet          PAST MEDICAL HISTORY, FAMILY HISTORY and SOCIAL HISTORY reviewed by me (KEVIN Roberson MD) and are updated consistent with the patient's report.    CURRENT MEDICATION LIST, and ALLERGY LIST reviewed by me (KEVIN Roberson MD) and are updated consistent with the patient's report as follows:  Outpatient Prescriptions Marked as Taking for the 4/26/18 encounter (Office Visit) with KEVIN Roberson MD   Medication Sig Dispense Refill    aspirin (ECOTRIN) 81 MG EC tablet Take 81 mg by mouth once daily.      atorvastatin (LIPITOR) 80 MG tablet Take 1 tablet (80 mg total) by mouth once daily. 90 tablet 3    cetirizine (ZYRTEC) 10 MG tablet Take 10 mg by mouth once daily.      colchicine (COLCRYS) 0.6 mg tablet Take 1 tablet (0.6 mg total) by mouth 2 (two) times daily. (Patient taking differently: Take 0.6 mg by mouth as needed. ) 60 tablet 11    doxazosin (CARDURA) 1 MG tablet Take 1 mg by mouth every evening.      febuxostat (ULORIC) 40 mg Tab Take 2 tablets (80 mg total) by mouth once daily. 180 tablet 1    isosorbide mononitrate (IMDUR) 60 MG 24 hr tablet Take 1 tablet (60 mg total) by mouth once daily. 30 tablet 1    lisinopril (PRINIVIL,ZESTRIL) 40 MG tablet Take 1 tablet (40 mg total) by mouth 2 (two) times daily. 180 tablet 3    metoprolol tartrate (LOPRESSOR) 25 MG tablet Take 1 tablet (25 mg total) by mouth 2 (two) times daily. 180 tablet 3    MULTIVITAMIN/IRON/FOLIC ACID (CENTRUM COMPLETE ORAL) Take by mouth once daily at 6am.      ranolazine (RANEXA) 500 MG Tb12 Take 1 tablet (500 mg total) by mouth 2 (two) times daily. 60 tablet 1    ticagrelor (BRILINTA) 90 mg tablet Take 1 tablet (90 mg total) by mouth 2 (two) times daily. 180 tablet 3      Allergies as of 04/26/2018    (No Known Allergies)       REVIEW OF SYSTEMS  CONSTITUTIONAL: No fever reported.  CARDIOVASCULAR: No chest pain reported.  PULMONARY: No trouble breathing  "reported.     PHYSICAL EXAM  Vitals:    04/26/18 1133   BP: 138/88   BP Location: Right arm   Patient Position: Sitting   BP Method: Medium (Manual)   Pulse: 64   Temp: 96.4 °F (35.8 °C)   TempSrc: Tympanic   SpO2: 99%   Weight: 107.3 kg (236 lb 8.9 oz)   Height: 6' 3" (1.905 m)     CONSTITUTIONAL: Vital signs noted. No apparent distress. Does not appear acutely ill or septic. Appears adequately hydrated.  HEENT: External ENT grossly unremarkable. Hearing grossly intact. Oropharynx moist.  PULM: Lungs clear. Breathing unlabored.  HEART: Auscultation reveals regular rate and rhythm without murmur, gallop or rub.  DERM: Skin warm and moist with normal turgor.  NEURO: There are no gross focal motor deficits or gross deficits of cranial nerves III-XII.  PSYCHIATRIC: Alert and oriented x 3. Mood is grossly neutral. Affect appropriate. Judgment and insight not grossly compromised.  MUSCULOSKELETAL: Grossly normal stance and gait.     ASSESSMENT and PLAN  Coronary artery disease involving native coronary artery of native heart without angina pectoris  -     nitroGLYCERIN (NITROSTAT) 0.4 MG SL tablet; Dissolve one tablet underneath tongue at onset of angina; may repeat every 5 minutes if needed. Call 911 if angina persists after 2 doses.  Dispense: 100 tablet; Refill: 5    Essential hypertension  -     Hypertension Digital Medicine (HDMP) Enrollment Order  -     Hypertension Digital Medicine (HDMP): Assign Onboarding Questionnaires    Status post angioplasty with stent    Idiopathic chronic gout of left knee without tophus    Elevated bilirubin    Type 2 diabetes mellitus with diabetic nephropathy, without long-term current use of insulin    Mixed hyperlipidemia        PRESCRIPTION MEDICATION MANAGEMENT  Except as noted below, CURRENT MEDICATIONS are to remain unchanged from that listed above.    Medications Ordered This Encounter      nitroGLYCERIN (NITROSTAT) 0.4 MG SL tablet          Sig: Dissolve one tablet underneath " "tongue at onset of angina; may repeat every 5 minutes if needed. Call 911 if angina persists after 2 doses.          Dispense:  100 tablet          Refill:  5  There are no discontinued medications.    Follow-up for periodic management of chronic medical conditions, any new complaints or concerns.    ABOUT THIS DOCUMENTATION:  · The order of the conditions listed in the HPI is one of convenience and does not necessarily reflect the chronology of the appointment, nor the relative importance of a condition. It is possible that additional description or status details about condition(s) may be found elsewhere in the EHR documentation for today's encounter.  · Documentation entered by me for this encounter was done in part using speech-recognition technology. Although I have made an effort to ensure accuracy, "sound like" errors may exist and should be interpreted in context.                        -KEVIN Roberson MD    There are no Patient Instructions on file for this visit.      "

## 2022-04-16 NOTE — PROGRESS NOTES
Received report from Radha, [facility]. Patient resting quietly in bed. Sitter at bedside. A&O x4. Respirations present. On room air. No signs of distress. No needs expressed. Bed low and locked. Call light within reach. Will continue to monitor.

## 2022-04-16 NOTE — PROGRESS NOTES
Late entry: BSR received  Patient resting in bed with sitter at bedside  RR even/unlabored on RA   Patient complains of N/V/D MD was made aware on previous shift  C-diff sample sent/awaiting results

## 2022-04-16 NOTE — PROGRESS NOTES
Constant Observer Yes - Name: Vitaly Guerrero Observer Oriented YES   High risk patients are in line of sight at all times Yes   Excess equipment/medical supplies not necessary for the care of the patient removed Yes   All sharp or dangerous objects are removed from room: including but not limited to belts, pens & pencils, needles, medications, cosmetics, lighters, matches, nail files, watches, necklaces, glass objects, razors, razor blades, knives, aerosol sprays, drawstring pants, shoes, cords (telephone, call bells, etc.) cleaning wipes or other cleaning items, aluminum cans, not permanently attached wall décor Yes   Telephone/cell phone removed as well as TV remote (batteries can be swallowed) Yes   Patient belongings removed and labeled at nurses station Yes   Excess linen is removed from room Yes   All plastic bags are removed from the room and replaced with paper trash bags Yes   Patient is in paper scrubs or appropriate gown and using hospital socks with rubber soles Yes   No metal, hard eating utensils or hard plates are on meal tray Yes   Remove all cleaning agents used by Iftikhar's Yes   If Crucifix is hanging on a nail, remove Crucifix as well as the nail Yes       *If any question above is answered \"No,\" documentation is required.

## 2022-04-17 PROCEDURE — 74011250637 HC RX REV CODE- 250/637: Performed by: HOSPITALIST

## 2022-04-17 PROCEDURE — 65270000029 HC RM PRIVATE

## 2022-04-17 PROCEDURE — 74011250637 HC RX REV CODE- 250/637: Performed by: FAMILY MEDICINE

## 2022-04-17 PROCEDURE — 74011250637 HC RX REV CODE- 250/637: Performed by: STUDENT IN AN ORGANIZED HEALTH CARE EDUCATION/TRAINING PROGRAM

## 2022-04-17 PROCEDURE — 74011250637 HC RX REV CODE- 250/637: Performed by: INTERNAL MEDICINE

## 2022-04-17 PROCEDURE — 74011250636 HC RX REV CODE- 250/636: Performed by: FAMILY MEDICINE

## 2022-04-17 RX ADMIN — Medication 500 MG: at 08:38

## 2022-04-17 RX ADMIN — Medication 500 MG: at 17:09

## 2022-04-17 RX ADMIN — PROMETHAZINE HYDROCHLORIDE 25 MG: 25 TABLET ORAL at 19:44

## 2022-04-17 RX ADMIN — DIVALPROEX SODIUM 250 MG: 125 CAPSULE, COATED PELLETS ORAL at 17:09

## 2022-04-17 RX ADMIN — LOPERAMIDE HYDROCHLORIDE 2 MG: 2 CAPSULE ORAL at 19:03

## 2022-04-17 RX ADMIN — PROMETHAZINE HYDROCHLORIDE 25 MG: 25 TABLET ORAL at 00:54

## 2022-04-17 RX ADMIN — LORAZEPAM 1 MG: 1 TABLET ORAL at 16:50

## 2022-04-17 RX ADMIN — OLANZAPINE 5 MG: 5 TABLET, ORALLY DISINTEGRATING ORAL at 08:38

## 2022-04-17 RX ADMIN — ENOXAPARIN SODIUM 40 MG: 100 INJECTION SUBCUTANEOUS at 08:38

## 2022-04-17 RX ADMIN — CALCIUM CARBONATE (ANTACID) CHEW TAB 500 MG 200 MG: 500 CHEW TAB at 22:52

## 2022-04-17 RX ADMIN — LORAZEPAM 1 MG: 1 TABLET ORAL at 21:42

## 2022-04-17 RX ADMIN — ACETAMINOPHEN 650 MG: 325 TABLET ORAL at 13:55

## 2022-04-17 RX ADMIN — DIVALPROEX SODIUM 250 MG: 125 CAPSULE, COATED PELLETS ORAL at 08:38

## 2022-04-17 RX ADMIN — DIPHENHYDRAMINE HYDROCHLORIDE 25 MG: 25 CAPSULE ORAL at 21:42

## 2022-04-17 RX ADMIN — LOPERAMIDE HYDROCHLORIDE 2 MG: 2 CAPSULE ORAL at 13:55

## 2022-04-17 NOTE — PROGRESS NOTES
Reviewed notes for any new spiritual concerns      Patient is resting peacefully in bed    Sitter present

## 2022-04-17 NOTE — PROGRESS NOTES
Hospitalist Progress Note   Admit Date:  3/28/2022  6:49 PM   Name:  Galileo Meza   Age:  46 y.o. Sex:  female  :  1970   MRN:  [id number]   Room:  [facility]/    Presenting Complaint: Drug Overdose and Mental Health Problem    Reason(s) for Admission: Intentional lithium overdose [facility] Course & Interval History:   Galileo Meza is a 46 y.o. female with medical history of bipolar disorder admitted with reported intentional overdose of lithium. Patient apparently took an unknown amount of lithium and \"something else\" in an attempt to commit suicide. She has her Lithium bottle with her. The quality dispensed was 120, and there are currently 105 pills counted by ER nurse. UDS is positive for amphetamines and THC. Started on Fluid resuscitation. Has been seen multiple times by Tele psychiatry and Marry Files, NP with psychiatry and remains on IVC papers. Subjective/24hr Events (22): Pt resting in bed. Diarrhea and vomiting seems to be improving as per the pt. No overnight events. Pt is aware that she is currently being evaluated for inpatient psychiatry facility. No reported chest pain, abdominal pain, fever, chills, cough. ROS:  10 systems reviewed and negative except as noted above. Assessment & Plan:     # Acute Diarrhea:  :  Stool is not consistent with C.diff  Florastor 500 mg po bid  Diarrhea improved    #Intentional lithium overdose:  : We will follow-up with psychiatrist recommendation for inpatient psych admission. Respiratory and renal status stable post overdose. CM/psych assisting in finding IP psych facility. #Substance abuse:  UDS positive for amphetamine and THC  Counseled    #Bipolar disorder:  Holding home meds for time being given intention overdose of lithium with elevated lithium level  Psychiatry consulted due to suicide attempt. / - trazadone qhs, prn ativan.  Holding furhter lithium   - Pyschiatry recommeding addition of zyprexa 5mg daily. Added  4/6 - increase zyprexa, cont ativan prn. Discontinued trazodone and started on Depakote. Decreased the dose of zyprexa    4/10 continue Depakote and Zyprexa  Reevaluated by Psychiatrist on 4/7 4/12 Increased dose of Depakote to 250mg BID  And s/p one dose of ativan  4/13: Continue Depakote to 250 mg twice daily      Discharge Planning: Inpatient psych and involuntary psych hold. Case management consulted. Patient w/o payor source and difficult disposition. Diet:  ADULT DIET Regular  DVT PPx: Lovenox  Code status: Full Code    Hospital Problems as of 4/17/2022 Never Reviewed          Codes Class Noted - Resolved POA    * (Principal) Intentional lithium overdose ([facility] ICD-10-CM: P78.272D  ICD-9-CM: 985.8, E950.9  3/29/2022 - Present Unknown        Bipolar 1 disorder, depressed, severe ([facility] ICD-10-CM: F31.4  ICD-9-CM: 296.53  11/27/2020 - Present Yes        History of methamphetamine abuse ([facility] ICD-10-CM: F15.11  ICD-9-CM: 305.73  1/26/2018 - Present Yes              Objective:     Patient Vitals for the past 24 hrs:   Temp Pulse Resp BP SpO2   04/17/22 0258 98.9 °F (37.2 °C) 100 19 120/69 95 %   04/16/22 2302 98.1 °F (36.7 °C) (!) 101 18 129/83 98 %   04/16/22 1917 98.4 °F (36.9 °C) (!) 103 18 119/66 95 %   04/16/22 1539 99.1 °F (37.3 °C) (!) 106 19 110/67 96 %     Oxygen Therapy  O2 Sat (%): 95 % (04/17/22 0258)  Pulse via Oximetry: 93 beats per minute (03/29/22 0329)  O2 Device: None (Room air) (04/16/22 1913)    Estimated body mass index is 23.91 kg/m² as calculated from the following:    Height as of this encounter: 5' 6\" (1.676 m). Weight as of this encounter: 67.2 kg (148 lb 2.4 oz). Intake/Output Summary (Last 24 hours) at 4/17/2022 0721  Last data filed at 4/17/2022 0624  Gross per 24 hour   Intake 150 ml   Output --   Net 150 ml         Physical Exam:     Blood pressure 120/69, pulse 100, temperature 98.9 °F (37.2 °C), resp.  rate 19, height 5' 6\" (1.676 m), weight 67.2 kg (148 lb 2.4 oz), SpO2 95 %. General:    Alert, awake, NAD, on room air  HEENT:           Head NCAT, PERRLA positive, MMM  Neck:  No restricted ROM. Trachea midline   CV:   RRR. No m/r/g. No jugular venous distension. Lungs:   CTAB. No wheezing, rhonchi, or rales. Respirations even, unlabored  Abdomen: Bowel sounds present. Soft, nontender, nondistended. Extremities: No cyanosis or clubbing. No edema  Skin:     No rashes and normal coloration. Warm and dry. Neuro:  gcs 15, no motor or sensory deficits, CN 2-12 intact  Psych:  AOx3, flat affect    I have reviewed ordered lab tests and independently visualized imaging below:    Recent Labs:  Recent Results (from the past 48 hour(s))   COVID-19 RAPID TEST    Collection Time: 04/15/22 12:14 PM   Result Value Ref Range    Specimen source NASAL      COVID-19 rapid test Not detected NOTD     LITHIUM    Collection Time: 04/15/22  3:33 PM   Result Value Ref Range    Lithium level <0.20 (L) 0.60 - 1.20 MMOL/L   SARS-COV-2    Collection Time: 04/15/22  5:37 PM   Result Value Ref Range    SARS-CoV-2 by PCR Please find results under separate order     SARS-COV-2, PCR    Collection Time: 04/15/22  5:37 PM    Specimen: Nasopharyngeal   Result Value Ref Range    Specimen source Nasopharyngeal      SARS-CoV-2 by PCR Not detected NOTD         All Micro Results     Procedure Component Value Units Date/Time    SARS-COV-2, PCR [[id number]] Collected: 04/15/22 2677    Order Status: Completed Specimen: Nasopharyngeal Updated: 04/16/22 1258     Specimen source Nasopharyngeal        SARS-CoV-2 by PCR Not detected        Comment:      The specimen is NEGATIVE for SARS-CoV-2, the novel coronavirus associated with COVID-19. This test has been authorized by the FDA under an Emergency Use Authorization (EUA) for use by authorized laboratories.         Fact sheet for Healthcare Providers: ConventionUpdate.co.nz       Fact sheet for Patients: iTendency.uy       Methodology: RT-PCR         C. DIFFICILE AG & TOXIN A/B [[id number]] Collected: 04/16/22 8759    Order Status: Canceled Specimen: Stool     COVID-19 RAPID TEST [[id number]] Collected: 04/15/22 1214    Order Status: Completed Specimen: Nasopharyngeal Updated: 04/15/22 1311     Specimen source NASAL        COVID-19 rapid test Not detected        Comment:      The specimen is NEGATIVE for SARS-CoV-2, the novel coronavirus associated with COVID-19. A negative result does not rule out COVID-19. This test has been authorized by the FDA under an Emergency Use Authorization (EUA) for use by authorized laboratories. Fact sheet for Healthcare Providers: iTendency.uy  Fact sheet for Patients: iTendency.uy       Methodology: Isothermal Nucleic Acid Amplification               Other Studies:  No results found.     Current Meds:  Current Facility-Administered Medications   Medication Dose Route Frequency    Saccharomyces boulardii (FLORASTOR) capsule 500 mg  500 mg Oral BID    promethazine (PHENERGAN) tablet 25 mg  25 mg Oral Q6H PRN    loperamide (IMODIUM) capsule 2 mg  2 mg Oral Q6H PRN    acetaminophen (TYLENOL) tablet 650 mg  650 mg Oral Q6H PRN    nicotine (NICODERM CQ) 21 mg/24 hr patch 1 Patch  1 Patch TransDERmal Q24H    diphenhydrAMINE (BENADRYL) capsule 25 mg  25 mg Oral Q6H PRN    divalproex (DEPAKOTE SPRINKLE) capsule 250 mg  250 mg Oral BID    melatonin tablet 5 mg  5 mg Oral QHS PRN    calcium carbonate (TUMS) chewable tablet 200 mg [elemental]  200 mg Oral TID PRN    OLANZapine (ZyPREXA zydis) disintegrating tablet 5 mg  5 mg Oral DAILY    haloperidol lactate (HALDOL) injection 2 mg  2 mg IntraMUSCular BID PRN    LORazepam (ATIVAN) tablet 1 mg  1 mg Oral Q4H PRN    pantothenic ac-min oil-pet,hyd (AQUAPHOR) 41 % ointment   Topical PRN    sodium chloride (NS) flush 5-40 mL 5-40 mL IntraVENous Q8H    sodium chloride (NS) flush 5-40 mL  5-40 mL IntraVENous PRN    ondansetron (ZOFRAN) injection 4 mg  4 mg IntraVENous Q6H PRN    enoxaparin (LOVENOX) injection 40 mg  40 mg SubCUTAneous DAILY       Signed:  Baylee Salter MD    Part of this note may have been written by using a voice dictation software. The note has been proof read but may still contain some grammatical/other typographical errors.

## 2022-04-17 NOTE — PROGRESS NOTES
Patient resting quietly in bed. Sitter at bedside. Respirations even and unlabored. On room air. No signs of distress. No needs expressed. To give report to oncoming RN.

## 2022-04-18 PROCEDURE — 74011250637 HC RX REV CODE- 250/637: Performed by: FAMILY MEDICINE

## 2022-04-18 PROCEDURE — 74011250637 HC RX REV CODE- 250/637: Performed by: STUDENT IN AN ORGANIZED HEALTH CARE EDUCATION/TRAINING PROGRAM

## 2022-04-18 PROCEDURE — 74011250636 HC RX REV CODE- 250/636: Performed by: FAMILY MEDICINE

## 2022-04-18 PROCEDURE — 74011250637 HC RX REV CODE- 250/637: Performed by: HOSPITALIST

## 2022-04-18 PROCEDURE — 87324 CLOSTRIDIUM AG IA: CPT

## 2022-04-18 PROCEDURE — 65270000029 HC RM PRIVATE

## 2022-04-18 PROCEDURE — 74011250637 HC RX REV CODE- 250/637: Performed by: INTERNAL MEDICINE

## 2022-04-18 PROCEDURE — 74011000250 HC RX REV CODE- 250: Performed by: FAMILY MEDICINE

## 2022-04-18 RX ORDER — CIPROFLOXACIN 500 MG/1
500 TABLET ORAL EVERY 12 HOURS
Status: DISCONTINUED | OUTPATIENT
Start: 2022-04-18 | End: 2022-04-19 | Stop reason: HOSPADM

## 2022-04-18 RX ORDER — METRONIDAZOLE 500 MG/1
500 TABLET ORAL 3 TIMES DAILY
Qty: 15 TABLET | Refills: 0 | Status: SHIPPED | OUTPATIENT
Start: 2022-04-18 | End: 2022-04-23

## 2022-04-18 RX ORDER — DIPHENHYDRAMINE HCL 25 MG
25 CAPSULE ORAL
Qty: 20 CAPSULE | Refills: 2 | Status: SHIPPED
Start: 2022-04-18 | End: 2022-04-28

## 2022-04-18 RX ORDER — OLANZAPINE 5 MG/1
5 TABLET, ORALLY DISINTEGRATING ORAL DAILY
Qty: 30 TABLET | Refills: 3 | Status: SHIPPED
Start: 2022-04-18 | End: 2022-05-18

## 2022-04-18 RX ORDER — SAME BUTANEDISULFONATE/BETAINE 400-600 MG
500 POWDER IN PACKET (EA) ORAL 2 TIMES DAILY
Qty: 28 CAPSULE | Refills: 0 | Status: SHIPPED | OUTPATIENT
Start: 2022-04-18 | End: 2022-04-25

## 2022-04-18 RX ORDER — METRONIDAZOLE 500 MG/1
500 TABLET ORAL 3 TIMES DAILY
Status: DISCONTINUED | OUTPATIENT
Start: 2022-04-18 | End: 2022-04-19 | Stop reason: HOSPADM

## 2022-04-18 RX ORDER — DIVALPROEX SODIUM 125 MG/1
250 CAPSULE, COATED PELLETS ORAL 2 TIMES DAILY
Qty: 120 CAPSULE | Refills: 2 | Status: SHIPPED | OUTPATIENT
Start: 2022-04-18 | End: 2022-05-18

## 2022-04-18 RX ORDER — DIPHENOXYLATE HYDROCHLORIDE AND ATROPINE SULFATE 2.5; .025 MG/1; MG/1
1 TABLET ORAL
Qty: 14 TABLET | Refills: 1 | Status: SHIPPED
Start: 2022-04-18

## 2022-04-18 RX ORDER — DIPHENOXYLATE HYDROCHLORIDE AND ATROPINE SULFATE 2.5; .025 MG/1; MG/1
2 TABLET ORAL ONCE
Status: COMPLETED | OUTPATIENT
Start: 2022-04-18 | End: 2022-04-18

## 2022-04-18 RX ADMIN — Medication 5 MG: at 01:07

## 2022-04-18 RX ADMIN — CIPROFLOXACIN 500 MG: 500 TABLET, FILM COATED ORAL at 20:20

## 2022-04-18 RX ADMIN — PROMETHAZINE HYDROCHLORIDE 25 MG: 25 TABLET ORAL at 01:07

## 2022-04-18 RX ADMIN — DIVALPROEX SODIUM 250 MG: 125 CAPSULE, COATED PELLETS ORAL at 09:57

## 2022-04-18 RX ADMIN — METRONIDAZOLE 500 MG: 500 TABLET ORAL at 11:54

## 2022-04-18 RX ADMIN — ONDANSETRON 4 MG: 2 INJECTION INTRAMUSCULAR; INTRAVENOUS at 02:16

## 2022-04-18 RX ADMIN — OLANZAPINE 5 MG: 5 TABLET, ORALLY DISINTEGRATING ORAL at 09:57

## 2022-04-18 RX ADMIN — ENOXAPARIN SODIUM 40 MG: 100 INJECTION SUBCUTANEOUS at 09:56

## 2022-04-18 RX ADMIN — DIPHENOXYLATE HYDROCHLORIDE AND ATROPINE SULFATE 2 TABLET: 2.5; .025 TABLET ORAL at 10:00

## 2022-04-18 RX ADMIN — ACETAMINOPHEN 650 MG: 325 TABLET ORAL at 03:22

## 2022-04-18 RX ADMIN — LOPERAMIDE HYDROCHLORIDE 2 MG: 2 CAPSULE ORAL at 09:56

## 2022-04-18 RX ADMIN — ACETAMINOPHEN 650 MG: 325 TABLET ORAL at 20:20

## 2022-04-18 RX ADMIN — DIVALPROEX SODIUM 250 MG: 125 CAPSULE, COATED PELLETS ORAL at 17:26

## 2022-04-18 RX ADMIN — Medication 5 MG: at 21:04

## 2022-04-18 RX ADMIN — CALCIUM CARBONATE (ANTACID) CHEW TAB 500 MG 200 MG: 500 CHEW TAB at 20:20

## 2022-04-18 RX ADMIN — LOPERAMIDE HYDROCHLORIDE 2 MG: 2 CAPSULE ORAL at 17:26

## 2022-04-18 RX ADMIN — ONDANSETRON 4 MG: 2 INJECTION INTRAMUSCULAR; INTRAVENOUS at 09:57

## 2022-04-18 RX ADMIN — Medication 500 MG: at 17:27

## 2022-04-18 RX ADMIN — LOPERAMIDE HYDROCHLORIDE 2 MG: 2 CAPSULE ORAL at 01:07

## 2022-04-18 RX ADMIN — METRONIDAZOLE 500 MG: 500 TABLET ORAL at 15:17

## 2022-04-18 RX ADMIN — SODIUM CHLORIDE, PRESERVATIVE FREE 10 ML: 5 INJECTION INTRAVENOUS at 15:17

## 2022-04-18 RX ADMIN — CIPROFLOXACIN 500 MG: 500 TABLET, FILM COATED ORAL at 10:03

## 2022-04-18 RX ADMIN — SODIUM CHLORIDE, PRESERVATIVE FREE 10 ML: 5 INJECTION INTRAVENOUS at 05:11

## 2022-04-18 RX ADMIN — SODIUM CHLORIDE, PRESERVATIVE FREE 10 ML: 5 INJECTION INTRAVENOUS at 22:00

## 2022-04-18 RX ADMIN — Medication 500 MG: at 09:57

## 2022-04-18 RX ADMIN — METRONIDAZOLE 500 MG: 500 TABLET ORAL at 21:04

## 2022-04-18 RX ADMIN — LORAZEPAM 1 MG: 1 TABLET ORAL at 21:04

## 2022-04-18 NOTE — PROGRESS NOTES
Patient having multiple liquid stools, given imodium and phenergan with no relief. MD notified. Ordered another C.diff stool specimen. Placed PIV and gave IV zofran.

## 2022-04-18 NOTE — PROGRESS NOTES
Late entry: BSR received  Patient resting in bed with sitter at bedside  RR even/unlabored on RA with NAD noted at this time

## 2022-04-18 NOTE — PROGRESS NOTES
MSN, CM:  Patient was accepted this AM to [facility]. Nicholas Palomino and SHAZIA was notified of transport needs. Patient was then denied r/t C-diff order per RN. MD attempted to speak with Latha Neil' MD (Dr. Estephania Wiggins and Dr. Yuli Bush) with no success. Transport sent away at this time r/t Barak Potter at [facility]. Nicholas Palomino stated patient was no longer approved for today. MD did receive a call back from Latha Neil and they are requiring C-diff to be ran with negative result before acceptance. Attempted to contact Jeri Farris (Infection Control) with no success; left message. Called micro and informed them that a C-diff was going to be sent and please run it. It was explained that if anyone attempted to stop the test to have them call 8th floor CMAminata Palomino will accept in AM if C-diff is ran with a negative result.

## 2022-04-18 NOTE — PROGRESS NOTES
Hospitalist Progress Note   Admit Date:  3/28/2022  6:49 PM   Name:  Romayne La   Age:  46 y.o. Sex:  female  :  1970   MRN:  [id number]   Room:  [facility]/    Presenting Complaint: Drug Overdose and Mental Health Problem    Reason(s) for Admission: Intentional lithium overdose [facility] Course & Interval History:   Romayne La is a 46 y.o. female with medical history of bipolar disorder admitted with reported intentional overdose of lithium. Patient apparently took an unknown amount of lithium and \"something else\" in an attempt to commit suicide. She has her Lithium bottle with her. The quality dispensed was 120, and there are currently 105 pills counted by ER nurse. UDS is positive for amphetamines and THC. Started on Fluid resuscitation. Has been seen multiple times by Tele psychiatry and Shante Anderson NP with psychiatry and remains on IVC papers. Subjective/24hr Events (22): Pt resting in bed. Diarrhea and vomiting seems to be improving as per the pt. No overnight events. Pt is aware that she is currently being evaluated for inpatient psychiatry facility. No reported chest pain, abdominal pain, fever, chills, cough. ROS:  10 systems reviewed and negative except as noted above. Assessment & Plan:     # Acute Diarrhea:  :  Stool is not consistent with C.diff  Florastor 500 mg po bid  Diarrhea improved    #Intentional lithium overdose:  : We will follow-up with psychiatrist recommendation for inpatient psych admission. Respiratory and renal status stable post overdose. CM/psych assisting in finding IP psych facility. #Substance abuse:  UDS positive for amphetamine and THC  Counseled    #Bipolar disorder:  Holding home meds for time being given intention overdose of lithium with elevated lithium level  Psychiatry consulted due to suicide attempt. / - trazadone qhs, prn ativan.  Holding furhter lithium   - Pyschiatry recommeding addition of zyprexa 5mg daily. Added  4/6 - increase zyprexa, cont ativan prn. Discontinued trazodone and started on Depakote. Decreased the dose of zyprexa    4/10 continue Depakote and Zyprexa  Reevaluated by Psychiatrist on 4/7 4/12 Increased dose of Depakote to 250mg BID  And s/p one dose of ativan  4/13: Continue Depakote to 250 mg twice daily      Discharge Planning: Inpatient psych and involuntary psych hold. Case management consulted. Patient w/o payor source and difficult disposition. Diet:  ADULT DIET Regular  DVT PPx: Lovenox  Code status: Full Code    Hospital Problems as of 4/18/2022 Never Reviewed          Codes Class Noted - Resolved POA    * (Principal) Intentional lithium overdose ([facility] ICD-10-CM: P04.688Q  ICD-9-CM: 985.8, E950.9  3/29/2022 - Present Unknown        Bipolar 1 disorder, depressed, severe ([facility] ICD-10-CM: F31.4  ICD-9-CM: 296.53  11/27/2020 - Present Yes        History of methamphetamine abuse ([facility] ICD-10-CM: F15.11  ICD-9-CM: 305.73  1/26/2018 - Present Yes              Objective:     Patient Vitals for the past 24 hrs:   Temp Pulse Resp BP SpO2   04/18/22 0330 99.3 °F (37.4 °C) (!) 102 20 (!) 107/51 100 %   04/17/22 2346 99.1 °F (37.3 °C) (!) 107 20 105/70 100 %   04/17/22 1922 98.1 °F (36.7 °C) (!) 102 16 (!) 101/56 98 %   04/17/22 1512 97.2 °F (36.2 °C) (!) 112 18 134/85 98 %   04/17/22 1041 97.8 °F (36.6 °C) (!) 109 16 123/70 93 %   04/17/22 0745 97.8 °F (36.6 °C) (!) 106 18 120/83 98 %     Oxygen Therapy  O2 Sat (%): 100 % (04/18/22 0330)  Pulse via Oximetry: 93 beats per minute (03/29/22 0329)  O2 Device: None (Room air) (04/17/22 1922)    Estimated body mass index is 23.91 kg/m² as calculated from the following:    Height as of this encounter: 5' 6\" (1.676 m). Weight as of this encounter: 67.2 kg (148 lb 2.4 oz).     Intake/Output Summary (Last 24 hours) at 4/18/2022 0711  Last data filed at 4/17/2022 1922  Gross per 24 hour   Intake 240 ml   Output -- Net 240 ml         Physical Exam:     Blood pressure (!) 107/51, pulse (!) 102, temperature 99.3 °F (37.4 °C), resp. rate 20, height 5' 6\" (1.676 m), weight 67.2 kg (148 lb 2.4 oz), SpO2 100 %. General:    Alert, awake, NAD, on room air  HEENT:           Head NCAT, PERRLA positive, MMM  Neck:  No restricted ROM. Trachea midline   CV:   RRR. No m/r/g. No jugular venous distension. Lungs:   CTAB. No wheezing, rhonchi, or rales. Respirations even, unlabored  Abdomen: Bowel sounds present. Soft, nontender, nondistended. Extremities: No cyanosis or clubbing. No edema  Skin:     No rashes and normal coloration. Warm and dry. Neuro:  gcs 15, no motor or sensory deficits, CN 2-12 intact  Psych:  AOx3, flat affect    I have reviewed ordered lab tests and independently visualized imaging below:    Recent Labs:  No results found for this or any previous visit (from the past 48 hour(s)). All Micro Results     Procedure Component Value Units Date/Time    C. DIFFICILE AG & TOXIN A/B [[id number]]     Order Status: Sent Specimen: Stool     SARS-COV-2, PCR [[id number]] Collected: 04/15/22 1737    Order Status: Completed Specimen: Nasopharyngeal Updated: 04/16/22 1258     Specimen source Nasopharyngeal        SARS-CoV-2 by PCR Not detected        Comment:      The specimen is NEGATIVE for SARS-CoV-2, the novel coronavirus associated with COVID-19. This test has been authorized by the FDA under an Emergency Use Authorization (EUA) for use by authorized laboratories.         Fact sheet for Healthcare Providers: [employer]Update[employer]co[employer]nz       Fact sheet for Patients: [employer]Update[employer]co.nz       Methodology: RT-PCR         C. DIFFICILE AG & TOXIN A/B [[id number]] Collected: 04/16/22 3644    Order Status: Canceled Specimen: Stool     COVID-19 RAPID TEST [[id number]] Collected: 04/15/22 1214    Order Status: Completed Specimen: Nasopharyngeal Updated: 04/15/22 1311     Specimen source NASAL        COVID-19 rapid test Not detected        Comment:      The specimen is NEGATIVE for SARS-CoV-2, the novel coronavirus associated with COVID-19. A negative result does not rule out COVID-19. This test has been authorized by the FDA under an Emergency Use Authorization (EUA) for use by authorized laboratories. Fact sheet for Healthcare Providers: Cod[employer]ipte.co.nz  Fact sheet for Patients: [employer]       Methodology: Isothermal Nucleic Acid Amplification               Other Studies:  No results found. Current Meds:  Current Facility-Administered Medications   Medication Dose Route Frequency    Saccharomyces boulardii (FLORASTOR) capsule 500 mg  500 mg Oral BID    promethazine (PHENERGAN) tablet 25 mg  25 mg Oral Q6H PRN    loperamide (IMODIUM) capsule 2 mg  2 mg Oral Q6H PRN    acetaminophen (TYLENOL) tablet 650 mg  650 mg Oral Q6H PRN    nicotine (NICODERM CQ) 21 mg/24 hr patch 1 Patch  1 Patch TransDERmal Q24H    diphenhydrAMINE (BENADRYL) capsule 25 mg  25 mg Oral Q6H PRN    divalproex (DEPAKOTE SPRINKLE) capsule 250 mg  250 mg Oral BID    melatonin tablet 5 mg  5 mg Oral QHS PRN    calcium carbonate (TUMS) chewable tablet 200 mg [elemental]  200 mg Oral TID PRN    OLANZapine (ZyPREXA zydis) disintegrating tablet 5 mg  5 mg Oral DAILY    haloperidol lactate (HALDOL) injection 2 mg  2 mg IntraMUSCular BID PRN    LORazepam (ATIVAN) tablet 1 mg  1 mg Oral Q4H PRN    pantothenic ac-min oil-pet,hyd (AQUAPHOR) 41 % ointment   Topical PRN    sodium chloride (NS) flush 5-40 mL  5-40 mL IntraVENous Q8H    sodium chloride (NS) flush 5-40 mL  5-40 mL IntraVENous PRN    ondansetron (ZOFRAN) injection 4 mg  4 mg IntraVENous Q6H PRN    enoxaparin (LOVENOX) injection 40 mg  40 mg SubCUTAneous DAILY       Signed:  Hunter Etienne MD    Part of this note may have been written by using a voice dictation software.   The note has been proof read but may still contain some grammatical/other typographical errors.

## 2022-04-18 NOTE — DISCHARGE SUMMARY
Hospitalist Discharge Summary     Patient ID:  Niki Wong  [id number]  48 y.o.  1970  Admit date: 3/28/2022  6:49 PM  Discharge date and time: 4/18/2022  Attending: Maday Azevedo MD  PCP:  Elliott Koo, Not On File, PAROMI  Treatment Team: Attending Provider: Maday Azevedo MD; Primary Nurse: Ren Ingram, RN; Consulting Provider: Yaya Velasquez; Consulting Provider: Amelia Arias MD; Utilization Review: Honorio Dawson RN; Consulting Provider: Elias Lopez NP; Care Manager: Corry Barrera, RN; Utilization Review: Raleigh Montesinos, PATTY; Hospitalist: Maday Azevedo MD    Principal Diagnosis Intentional lithium overdose [facility])   Principal Problem:    Intentional lithium overdose ([facility] (3/29/2022)    Active Problems:    Bipolar 1 disorder, depressed, severe ([facility] (11/27/2020)      History of methamphetamine abuse ([facility] (1/26/2018)         Galindo Colon a 46 y. o. female with medical history of bipolar disorder admitted with reported intentional overdose of lithium. Patient apparently took an unknown amount of lithium and \"something else\" in an attempt to commit suicide. Masood Farley has her Lithium bottle with her. Suzzane Pickerel quality dispensed was 120, and there are currently 105 pills counted by ER nurse. UDS is positive for amphetamines and THC.    Started on Fluid resuscitation. Has been seen multiple times by Tele psychiatry and Dax Maloney NP with psychiatry and remains on IVC papers. Hospital Course:  # Acute Diarrhea:  4/18:  Flagyl 500 mg po tid x 5 days  florastor 500 mg po bid  Lomotil prn     #Intentional lithium overdose:  4/18:  IP psych facility.      #Substance abuse:  UDS positive for amphetamine and THC  Counseled     #Bipolar disorder:  Holding home meds for time being given intention overdose of lithium with elevated lithium level  Psychiatry consulted due to suicide attempt.      4/2 - trazadone qhs, prn ativan.  Holding furhter lithium  4/5 - Pyschiatry recommeding addition of zyprexa 5mg daily. Added  4/6 - increase zyprexa, cont ativan prn. Discontinued trazodone and started on Depakote. Decreased the dose of zyprexa    4/10 continue Depakote and Zyprexa  Reevaluated by Psychiatrist on 4/7 4/12 Increased dose of Depakote to 250mg BID  And s/p one dose of ativan  4/13: Continue Depakote to 250 mg twice daily       Discharge Planning:  Pt to be discharged to inpatient psych facility today. Significant Diagnostic Studies:   KUB     CLINICAL INDICATION: Overdose     FINDINGS: Two supine views of the abdomen and pelvis submitted. Stool is noted  throughout the entirety of the long colon. No dilated loops of small bowel  evident. Cholecystectomy clips are present.     IMPRESSION  No acute abdominal or pelvic abnormality. Labs: Results:       Chemistry No results for input(s): GLU, NA, K, CL, CO2, BUN, CREA, CA, AGAP, BUCR, TBIL, AP, TP, ALB, GLOB, AGRAT in the last 72 hours. No lab exists for component: GPT   CBC w/Diff No results for input(s): WBC, RBC, HGB, HCT, PLT, GRANS, LYMPH, EOS, HGBEXT, HCTEXT, PLTEXT in the last 72 hours. Cardiac Enzymes No results for input(s): CPK, CKND1, BETHEL in the last 72 hours. No lab exists for component: CKRMB, TROIP   Coagulation No results for input(s): PTP, INR, APTT, INREXT in the last 72 hours. Lipid Panel No results found for: CHOL, CHOLPOCT, CHOLX, CHLST, CHOLV, [id number], HDL, HDLP, LDL, LDLC, DLDLP, 969653, VLDLC, VLDL, TGLX, TRIGL, TRIGP, TGLPOCT, CHHD, CHHDX   BNP No results for input(s): BNPP in the last 72 hours. Liver Enzymes No results for input(s): TP, ALB, TBIL, AP in the last 72 hours.     No lab exists for component: SGOT, GPT, DBIL   Thyroid Studies Lab Results   Component Value Date/Time    TSH 0.650 03/28/2022 06:57 PM            Discharge Exam:  Visit Vitals  BP (!) 107/51 (BP 1 Location: Left upper arm, BP Patient Position: At rest)   Pulse (!) 102   Temp 99.3 °F (37.4 °C)   Resp 20   Ht 5' 6\" (1.676 m)   Wt 67.2 kg (148 lb 2.4 oz)   SpO2 100%   BMI 23.91 kg/m²     General appearance: alert, cooperative, no distress, appears stated age  Lungs: clear to auscultation bilaterally  Heart: regular rate and rhythm, S1, S2 normal, no murmur, click, rub or gallop  Abdomen: soft, non-tender. Bowel sounds normal. No masses,  no organomegaly  Extremities: no cyanosis or edema  Neurologic: Grossly normal    Disposition: inpatient psych facilty  Discharge Condition: stable  Patient Instructions:   Current Discharge Medication List      START taking these medications    Details   Saccharomyces boulardii (FLORASTOR) 250 mg capsule Take 2 Capsules by mouth two (2) times a day for 7 days. Qty: 28 Capsule, Refills: 0  Start date: 4/18/2022, End date: 4/25/2022      OLANZapine (ZyPREXA zydis) 5 mg disintegrating tablet Take 1 Tablet by mouth daily for 30 days. Qty: 30 Tablet, Refills: 3  Start date: 4/18/2022, End date: 5/18/2022      diphenoxylate-atropine (LOMOTIL) 2.5-0.025 mg per tablet Take 1 Tablet by mouth four (4) times daily as needed for Diarrhea. Max Daily Amount: 4 Tablets. Qty: 14 Tablet, Refills: 1  Start date: 4/18/2022    Associated Diagnoses: Diarrhea, unspecified type      divalproex (DEPAKOTE SPRINKLE) 125 mg capsule Take 2 Capsules by mouth two (2) times a day for 30 days. Qty: 120 Capsule, Refills: 2  Start date: 4/18/2022, End date: 5/18/2022      diphenhydrAMINE (BENADRYL) 25 mg capsule Take 1 Capsule by mouth every six (6) hours as needed for Itching or Skin Irritation for up to 10 days. Indications: difficulty sleeping, 2nd line for sleep if no reponse from melatonin  Qty: 20 Capsule, Refills: 2  Start date: 4/18/2022, End date: 4/28/2022      metroNIDAZOLE (FLAGYL) 500 mg tablet Take 1 Tablet by mouth three (3) times daily for 5 days.   Qty: 15 Tablet, Refills: 0  Start date: 4/18/2022, End date: 4/23/2022         CONTINUE these medications which have NOT CHANGED    Details   traZODone (DESYREL) 50 mg tablet Take 50 mg by mouth.          STOP taking these medications       gabapentin (NEURONTIN) 100 mg capsule Comments:   Reason for Stopping:         lithium carbonate SR (LITHOBID) 300 mg CR tablet Comments:   Reason for Stopping:         diphenhydrAMINE (BENADRYL) 25 mg tablet Comments:   Reason for Stopping:         hydrOXYzine HCL (ATARAX) 25 mg tablet Comments:   Reason for Stopping:         prazosin (MINIPRESS) 1 mg capsule Comments:   Reason for Stopping:         buPROPion XL (WELLBUTRIN XL) 150 mg tablet Comments:   Reason for Stopping:         risperiDONE (RisperDAL) 2 mg tablet Comments:   Reason for Stopping:         melatonin 3 mg tablet Comments:   Reason for Stopping:         biotin 2,500 mcg tab Comments:   Reason for Stopping:         oxyCODONE IR (ROXICODONE) 5 mg immediate release tablet Comments:   Reason for Stopping:               Activity: Activity as tolerated  Diet: Regular Diet  Wound Care: None needed    Follow-up  ·   Follow up with psychiatrist at [facility]  Time spent to discharge patient 35 minutes  Signed:  Mik Francis MD  4/18/2022  11:21 AM

## 2022-04-18 NOTE — PROGRESS NOTES
TRANSFER - OUT REPORT:    Verbal report given to [facility] RN(name) on Cecile Del Toro  being transferred to [facility] for routine progression of care       Report consisted of patients Situation, Background, Assessment and   Recommendations(SBAR). Information from the following report(s) SBAR was reviewed with the receiving nurse. Lines:   Peripheral IV 04/18/22 Anterior;Right Forearm (Active)   Site Assessment Clean, dry, & intact 04/18/22 0215   Phlebitis Assessment 0 04/18/22 0215   Infiltration Assessment 0 04/18/22 0215   Dressing Status Clean, dry, & intact 04/18/22 0215   Dressing Type Transparent;Tape 04/18/22 0215   Hub Color/Line Status Pink 04/18/22 0215   Alcohol Cap Used Yes 04/18/22 0215        Opportunity for questions and clarification was provided. Patient transported with:  [facility].

## 2022-04-18 NOTE — PROGRESS NOTES
Patient alert and oriented x 4. Patient in bed resting with sitter at bedside. Patient having some heart burn and will provide medication as ordered. Patient on RA with RR even/unlabored. Call light in reach and will continue to assess.

## 2022-04-19 VITALS
TEMPERATURE: 97.8 F | HEIGHT: 66 IN | RESPIRATION RATE: 18 BRPM | WEIGHT: 148.15 LBS | SYSTOLIC BLOOD PRESSURE: 103 MMHG | BODY MASS INDEX: 23.81 KG/M2 | OXYGEN SATURATION: 94 % | HEART RATE: 102 BPM | DIASTOLIC BLOOD PRESSURE: 89 MMHG

## 2022-04-19 LAB
C DIFF GDH STL QL: NORMAL
C DIFF TOX A+B STL QL IA: NORMAL
CLINICAL CONSIDERATION: NORMAL
INTERPRETATION: NORMAL
PCR REFLEX: NORMAL

## 2022-04-19 PROCEDURE — 74011250637 HC RX REV CODE- 250/637: Performed by: STUDENT IN AN ORGANIZED HEALTH CARE EDUCATION/TRAINING PROGRAM

## 2022-04-19 PROCEDURE — 74011250637 HC RX REV CODE- 250/637: Performed by: HOSPITALIST

## 2022-04-19 PROCEDURE — 74011000250 HC RX REV CODE- 250: Performed by: FAMILY MEDICINE

## 2022-04-19 PROCEDURE — 74011250636 HC RX REV CODE- 250/636: Performed by: FAMILY MEDICINE

## 2022-04-19 PROCEDURE — 74011250637 HC RX REV CODE- 250/637: Performed by: INTERNAL MEDICINE

## 2022-04-19 PROCEDURE — 74011250637 HC RX REV CODE- 250/637: Performed by: FAMILY MEDICINE

## 2022-04-19 RX ADMIN — CIPROFLOXACIN 500 MG: 500 TABLET, FILM COATED ORAL at 08:37

## 2022-04-19 RX ADMIN — Medication 500 MG: at 08:37

## 2022-04-19 RX ADMIN — SODIUM CHLORIDE, PRESERVATIVE FREE 10 ML: 5 INJECTION INTRAVENOUS at 05:26

## 2022-04-19 RX ADMIN — OLANZAPINE 5 MG: 5 TABLET, ORALLY DISINTEGRATING ORAL at 08:38

## 2022-04-19 RX ADMIN — ENOXAPARIN SODIUM 40 MG: 100 INJECTION SUBCUTANEOUS at 08:42

## 2022-04-19 RX ADMIN — DIVALPROEX SODIUM 250 MG: 125 CAPSULE, COATED PELLETS ORAL at 08:42

## 2022-04-19 RX ADMIN — METRONIDAZOLE 500 MG: 500 TABLET ORAL at 08:37

## 2022-04-19 RX ADMIN — LORAZEPAM 1 MG: 1 TABLET ORAL at 10:47

## 2022-04-19 NOTE — PROGRESS NOTES
Tums 200 mg chewable tab given for indigestion with tylenol 650 mg po given for headache per patient request.  Pain level at 7 and will assess medications. Call light in reach and sitter at bedside.

## 2022-04-19 NOTE — PROGRESS NOTES
MSN, CM:  [facility] be [facility]. Patient does not agree with this discharge plan but family does. Patient has met all milestones for this admission. [address] to transport patient to facility. Care Management Interventions  PCP Verified by CM: Yes  Mode of Transport at Discharge: Other (see comment) ([address])  Transition of Care Consult (CM Consult): Other ([facility])  Discharge Durable Medical Equipment: No  Physical Therapy Consult: No  Occupational Therapy Consult: No  Speech Therapy Consult: No  Support Systems: Spouse/Significant Other,Friend/Neighbor  Confirm Follow Up Transport: Self  The Plan for Transition of Care is Related to the Following Treatment Goals : Return home and back to her baseline  Freedom of Choice List was Provided with Basic Dialogue that Supports the Patient's Individualized Plan of Care/Goals, Treatment Preferences and Shares the Quality Data Associated with the Providers?: No  Discharge Location  Patient Expects to be Discharged to[de-identified] Outpatient psychiatric

## 2022-04-19 NOTE — PROGRESS NOTES
Patient sleeping in bed with no pain or distress noted. Patient remains on RA with RR even/unlabored.   All needs met and call light in reach with sitter at bedside and will prepare bedside shift report for oncoming nurse

## 2022-04-19 NOTE — PROGRESS NOTES
Patient released to [facility] [place].  For transport to [facility]

## 2022-04-19 NOTE — PROGRESS NOTES
Ativan 1 mg po given for anxiety and melatonin 5 mg po given for sleep per patient request.  Call light in reach and sitter at bedside.

## 2022-04-19 NOTE — PROGRESS NOTES
Indigestion and headache pain relieved with pain level at 0. Call light in reach with sitter at bedside.

## 2022-04-19 NOTE — DISCHARGE SUMMARY
Hospitalist Discharge Summary     Patient ID:  Jae Muñoz  [id number]  89 y.o.  1970  Admit date: 3/28/2022  6:49 PM  Discharge date and time: 4/19/2022  Attending: Saranya Grajeda MD  PCP:  Desire Montanez, Not On File, PA-C  Treatment Team: Attending Provider: Saranya Grajeda MD; Primary Nurse: Monica Trevizo, RN; Consulting Provider: David Ag; Consulting Provider: Trudy Greenberg MD; Utilization Review: Leha Rosado RN; Consulting Provider: Sandip Mascorro NP; Care Manager: Promise Townsend, RN; Utilization Review: Dimple Alvarado, PATTY; Hospitalist: Saranya Grajeda MD    Principal Diagnosis Intentional lithium overdose [facility])   Principal Problem:    Intentional lithium overdose ([facility] (3/29/2022)    Active Problems:    Bipolar 1 disorder, depressed, severe ([facility] (11/27/2020)      History of methamphetamine abuse ([facility] (1/26/2018)         Josr Fernandez a 46 y. o. female with medical history of bipolar disorder admitted with reported intentional overdose of lithium. Patient apparently took an unknown amount of lithium and \"something else\" in an attempt to commit suicide. Imelda Welch has her Lithium bottle with her. Kristen Lieu quality dispensed was 120, and there are currently 105 pills counted by ER nurse. UDS is positive for amphetamines and THC.    Started on Fluid resuscitation. Has been seen multiple times by Tele psychiatry and Tova Bowden NP with psychiatry and remains on IVC papers.      Hospital Course:  # Acute Diarrhea: C.diff ruled out  4/19:  Flagyl 500 mg po tid x 5 days (empiric coverage)  florastor 500 mg po bid  Lomotil prn  C.diff is negative on 4/19/22     #Intentional lithium overdose:  4/19:  IP psych facility.      #Substance abuse:  UDS positive for amphetamine and THC  Counseled     #Bipolar disorder:  Holding home meds for time being given intention overdose of lithium with elevated lithium level  Psychiatry consulted due to suicide attempt.      4/2 - trazadone qhs, prn ativan. Holding furhter lithium  4/5 - Pyschiatry recommeding addition of zyprexa 5mg daily. Added  4/6 - increase zyprexa, cont ativan prn. Discontinued trazodone and started on Depakote. Decreased the dose of zyprexa    4/10 continue Depakote and Zyprexa  Reevaluated by Psychiatrist on 4/7 4/12 Increased dose of Depakote to 250mg BID  And s/p one dose of ativan  4/13: Continue Depakote to 250 mg twice daily       Discharge Planning:  Pt to be discharged to inpatient psych facility today. Significant Diagnostic Studies:   KUB     CLINICAL INDICATION: Overdose     FINDINGS: Two supine views of the abdomen and pelvis submitted. Stool is noted  throughout the entirety of the long colon. No dilated loops of small bowel  evident. Cholecystectomy clips are present.     IMPRESSION  No acute abdominal or pelvic abnormality. Labs: Results:       Chemistry No results for input(s): GLU, NA, K, CL, CO2, BUN, CREA, CA, AGAP, BUCR, TBIL, AP, TP, ALB, GLOB, AGRAT in the last 72 hours. No lab exists for component: GPT   CBC w/Diff No results for input(s): WBC, RBC, HGB, HCT, PLT, GRANS, LYMPH, EOS, HGBEXT, HCTEXT, PLTEXT, HGBEXT, HCTEXT, PLTEXT in the last 72 hours. Cardiac Enzymes No results for input(s): CPK, CKND1, BETHEL in the last 72 hours. No lab exists for component: CKRMB, TROIP   Coagulation No results for input(s): PTP, INR, APTT, INREXT, INREXT in the last 72 hours. Lipid Panel No results found for: CHOL, CHOLPOCT, CHOLX, CHLST, CHOLV, 369982, HDL, HDLP, LDL, LDLC, DLDLP, 473634, VLDLC, VLDL, TGLX, TRIGL, TRIGP, TGLPOCT, CHHD, CHHDX   BNP No results for input(s): BNPP in the last 72 hours. Liver Enzymes No results for input(s): TP, ALB, TBIL, AP in the last 72 hours.     No lab exists for component: SGOT, GPT, DBIL   Thyroid Studies Lab Results   Component Value Date/Time    TSH 0.650 03/28/2022 06:57 PM            Discharge Exam:  Visit Vitals  /89   Pulse (!) 102   Temp 97.8 °F (36.6 °C)   Resp 18   Ht 5' 6\" (1.676 m)   Wt 67.2 kg (148 lb 2.4 oz)   SpO2 94%   BMI 23.91 kg/m²     General appearance: alert, cooperative, no distress, appears stated age  Lungs: clear to auscultation bilaterally  Heart: regular rate and rhythm, S1, S2 normal, no murmur, click, rub or gallop  Abdomen: soft, non-tender. Bowel sounds normal. No masses,  no organomegaly  Extremities: no cyanosis or edema  Neurologic: Grossly normal    Disposition: inpatient psych facilty  Discharge Condition: stable  Patient Instructions:   Current Discharge Medication List      START taking these medications    Details   Saccharomyces boulardii (FLORASTOR) 250 mg capsule Take 2 Capsules by mouth two (2) times a day for 7 days. Qty: 28 Capsule, Refills: 0  Start date: 4/18/2022, End date: 4/25/2022      OLANZapine (ZyPREXA zydis) 5 mg disintegrating tablet Take 1 Tablet by mouth daily for 30 days. Qty: 30 Tablet, Refills: 3  Start date: 4/18/2022, End date: 5/18/2022      diphenoxylate-atropine (LOMOTIL) 2.5-0.025 mg per tablet Take 1 Tablet by mouth four (4) times daily as needed for Diarrhea. Max Daily Amount: 4 Tablets. Qty: 14 Tablet, Refills: 1  Start date: 4/18/2022    Associated Diagnoses: Diarrhea, unspecified type      divalproex (DEPAKOTE SPRINKLE) 125 mg capsule Take 2 Capsules by mouth two (2) times a day for 30 days. Qty: 120 Capsule, Refills: 2  Start date: 4/18/2022, End date: 5/18/2022      diphenhydrAMINE (BENADRYL) 25 mg capsule Take 1 Capsule by mouth every six (6) hours as needed for Itching or Skin Irritation for up to 10 days. Indications: difficulty sleeping, 2nd line for sleep if no reponse from melatonin  Qty: 20 Capsule, Refills: 2  Start date: 4/18/2022, End date: 4/28/2022      metroNIDAZOLE (FLAGYL) 500 mg tablet Take 1 Tablet by mouth three (3) times daily for 5 days.   Qty: 15 Tablet, Refills: 0  Start date: 4/18/2022, End date: 4/23/2022         CONTINUE these medications which have NOT CHANGED Details   traZODone (DESYREL) 50 mg tablet Take 50 mg by mouth.          STOP taking these medications       gabapentin (NEURONTIN) 100 mg capsule Comments:   Reason for Stopping:         lithium carbonate SR (LITHOBID) 300 mg CR tablet Comments:   Reason for Stopping:         diphenhydrAMINE (BENADRYL) 25 mg tablet Comments:   Reason for Stopping:         hydrOXYzine HCL (ATARAX) 25 mg tablet Comments:   Reason for Stopping:         prazosin (MINIPRESS) 1 mg capsule Comments:   Reason for Stopping:         buPROPion XL (WELLBUTRIN XL) 150 mg tablet Comments:   Reason for Stopping:         risperiDONE (RisperDAL) 2 mg tablet Comments:   Reason for Stopping:         melatonin 3 mg tablet Comments:   Reason for Stopping:         biotin 2,500 mcg tab Comments:   Reason for Stopping:         oxyCODONE IR (ROXICODONE) 5 mg immediate release tablet Comments:   Reason for Stopping:               Activity: Activity as tolerated  Diet: Regular Diet  Wound Care: None needed    Follow-up  ·   Follow up with psychiatrist at [facility]  Time spent to discharge patient 35 minutes  Signed:  Scottie Byrne MD  4/19/2022  11:21 AM

## 2022-11-21 ENCOUNTER — HOSPITAL ENCOUNTER (EMERGENCY)
Age: 52
Discharge: HOME | End: 2022-11-21
Attending: STUDENT IN AN ORGANIZED HEALTH CARE EDUCATION/TRAINING PROGRAM
Payer: MEDICAID

## 2022-11-21 VITALS
TEMPERATURE: 98.5 F | BODY MASS INDEX: 23.4 KG/M2 | SYSTOLIC BLOOD PRESSURE: 118 MMHG | OXYGEN SATURATION: 96 % | HEART RATE: 85 BPM | HEIGHT: 66 IN | WEIGHT: 145.6 LBS | RESPIRATION RATE: 17 BRPM | DIASTOLIC BLOOD PRESSURE: 64 MMHG

## 2022-11-21 DIAGNOSIS — F41.1 ANXIETY STATE: Primary | ICD-10-CM

## 2022-11-21 LAB
ALBUMIN SERPL-MCNC: 4.7 G/DL (ref 3.5–5)
ALBUMIN/GLOB SERPL: 1.4 {RATIO} (ref 0.4–1.6)
ALP SERPL-CCNC: 91 U/L (ref 50–136)
ALT SERPL-CCNC: 13 U/L (ref 12–65)
AMPHET UR QL SCN: POSITIVE
ANION GAP SERPL CALC-SCNC: 4 MMOL/L (ref 2–11)
APPEARANCE UR: ABNORMAL
AST SERPL-CCNC: 10 U/L (ref 15–37)
BACTERIA URNS QL MICRO: 0 /HPF
BARBITURATES UR QL SCN: NEGATIVE
BASOPHILS # BLD: 0.1 K/UL (ref 0–0.2)
BASOPHILS NFR BLD: 1 % (ref 0–2)
BENZODIAZ UR QL: NEGATIVE
BILIRUB SERPL-MCNC: 0.8 MG/DL (ref 0.2–1.1)
BILIRUB UR QL: NEGATIVE
BUN SERPL-MCNC: 11 MG/DL (ref 6–23)
CALCIUM SERPL-MCNC: 10.3 MG/DL (ref 8.3–10.4)
CANNABINOIDS UR QL SCN: POSITIVE
CASTS URNS QL MICRO: ABNORMAL /LPF
CHLORIDE SERPL-SCNC: 108 MMOL/L (ref 101–110)
CO2 SERPL-SCNC: 28 MMOL/L (ref 21–32)
COCAINE UR QL SCN: NEGATIVE
COLOR UR: ABNORMAL
CREAT SERPL-MCNC: 1 MG/DL (ref 0.6–1)
DIFFERENTIAL METHOD BLD: ABNORMAL
EKG ATRIAL RATE: 75 BPM
EKG DIAGNOSIS: NORMAL
EKG P AXIS: 73 DEGREES
EKG P-R INTERVAL: 132 MS
EKG Q-T INTERVAL: 399 MS
EKG QRS DURATION: 89 MS
EKG QTC CALCULATION (BAZETT): 446 MS
EKG R AXIS: 61 DEGREES
EKG T AXIS: 72 DEGREES
EKG VENTRICULAR RATE: 75 BPM
EOSINOPHIL # BLD: 0.2 K/UL (ref 0–0.8)
EOSINOPHIL NFR BLD: 1 % (ref 0.5–7.8)
ERYTHROCYTE [DISTWIDTH] IN BLOOD BY AUTOMATED COUNT: 13.6 % (ref 11.9–14.6)
GLOBULIN SER CALC-MCNC: 3.4 G/DL (ref 2.8–4.5)
GLUCOSE SERPL-MCNC: 92 MG/DL (ref 65–100)
GLUCOSE UR STRIP.AUTO-MCNC: NEGATIVE MG/DL
HCG UR QL: NEGATIVE
HCT VFR BLD AUTO: 45.1 % (ref 35.8–46.3)
HGB BLD-MCNC: 15.2 G/DL (ref 11.7–15.4)
HGB UR QL STRIP: ABNORMAL
IMM GRANULOCYTES # BLD AUTO: 0.1 K/UL (ref 0–0.5)
IMM GRANULOCYTES NFR BLD AUTO: 0 % (ref 0–5)
KETONES UR QL STRIP.AUTO: NEGATIVE MG/DL
LEUKOCYTE ESTERASE UR QL STRIP.AUTO: ABNORMAL
LYMPHOCYTES # BLD: 3.9 K/UL (ref 0.5–4.6)
LYMPHOCYTES NFR BLD: 33 % (ref 13–44)
MCH RBC QN AUTO: 29.6 PG (ref 26.1–32.9)
MCHC RBC AUTO-ENTMCNC: 33.7 G/DL (ref 31.4–35)
MCV RBC AUTO: 87.7 FL (ref 82–102)
METHADONE UR QL: NEGATIVE
MONOCYTES # BLD: 0.8 K/UL (ref 0.1–1.3)
MONOCYTES NFR BLD: 7 % (ref 4–12)
MUCOUS THREADS URNS QL MICRO: ABNORMAL /LPF
NEUTS SEG # BLD: 6.8 K/UL (ref 1.7–8.2)
NEUTS SEG NFR BLD: 58 % (ref 43–78)
NITRITE UR QL STRIP.AUTO: NEGATIVE
NRBC # BLD: 0 K/UL (ref 0–0.2)
OPIATES UR QL: NEGATIVE
OTHER OBSERVATIONS: ABNORMAL
PCP UR QL: NEGATIVE
PH UR STRIP: 5.5 [PH] (ref 5–9)
PLATELET # BLD AUTO: 376 K/UL (ref 150–450)
PMV BLD AUTO: 11.3 FL (ref 9.4–12.3)
POTASSIUM SERPL-SCNC: 3.9 MMOL/L (ref 3.5–5.1)
PROT SERPL-MCNC: 8.1 G/DL (ref 6.3–8.2)
PROT UR STRIP-MCNC: 30 MG/DL
RBC # BLD AUTO: 5.14 M/UL (ref 4.05–5.2)
SODIUM SERPL-SCNC: 140 MMOL/L (ref 133–143)
SP GR UR REFRACTOMETRY: 1.01 (ref 1–1.02)
UROBILINOGEN UR QL STRIP.AUTO: 0.2 EU/DL (ref 0.2–1)
WBC # BLD AUTO: 11.7 K/UL (ref 4.3–11.1)
WBC URNS QL MICRO: ABNORMAL /HPF

## 2022-11-21 PROCEDURE — 80307 DRUG TEST PRSMV CHEM ANLYZR: CPT

## 2022-11-21 PROCEDURE — 6370000000 HC RX 637 (ALT 250 FOR IP): Performed by: STUDENT IN AN ORGANIZED HEALTH CARE EDUCATION/TRAINING PROGRAM

## 2022-11-21 PROCEDURE — 81001 URINALYSIS AUTO W/SCOPE: CPT

## 2022-11-21 PROCEDURE — 80053 COMPREHEN METABOLIC PANEL: CPT

## 2022-11-21 PROCEDURE — 99284 EMERGENCY DEPT VISIT MOD MDM: CPT

## 2022-11-21 PROCEDURE — 85025 COMPLETE CBC W/AUTO DIFF WBC: CPT

## 2022-11-21 PROCEDURE — 93005 ELECTROCARDIOGRAM TRACING: CPT | Performed by: STUDENT IN AN ORGANIZED HEALTH CARE EDUCATION/TRAINING PROGRAM

## 2022-11-21 PROCEDURE — 81025 URINE PREGNANCY TEST: CPT

## 2022-11-21 RX ORDER — HYDROXYZINE PAMOATE 25 MG/1
25 CAPSULE ORAL 3 TIMES DAILY PRN
Qty: 20 CAPSULE | Refills: 0 | Status: SHIPPED | OUTPATIENT
Start: 2022-11-21 | End: 2022-12-05

## 2022-11-21 RX ORDER — LORAZEPAM 1 MG/1
1 TABLET ORAL EVERY 4 HOURS PRN
Status: DISCONTINUED | OUTPATIENT
Start: 2022-11-21 | End: 2022-11-21 | Stop reason: HOSPADM

## 2022-11-21 RX ADMIN — LORAZEPAM 1 MG: 1 TABLET ORAL at 07:48

## 2022-11-21 ASSESSMENT — ENCOUNTER SYMPTOMS
SORE THROAT: 0
EYE ITCHING: 0
BACK PAIN: 0
COUGH: 0
VOMITING: 0
EYE DISCHARGE: 0
SHORTNESS OF BREATH: 0
NAUSEA: 0
APNEA: 0
WHEEZING: 0
ABDOMINAL PAIN: 0
DIARRHEA: 0
EYE PAIN: 0
CHEST TIGHTNESS: 0
ABDOMINAL DISTENTION: 0
ANAL BLEEDING: 0
COLOR CHANGE: 0
RHINORRHEA: 0
EYE REDNESS: 0

## 2022-11-21 ASSESSMENT — PAIN - FUNCTIONAL ASSESSMENT: PAIN_FUNCTIONAL_ASSESSMENT: NONE - DENIES PAIN

## 2022-11-21 NOTE — DISCHARGE INSTRUCTIONS
Avoid Methamphetamine use. Take the medication prescribed as needed for anxious feeling. Arrange follow-up with your provider use the information below to establish primary care in the area. We would love to help you get a primary care doctor for follow-up after your emergency department visit. Please call [phone] between 7AM - 6PM Monday to Friday. A care navigator will be able to assist you with setting up a doctor close to your home.
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
## 2023-03-20 ENCOUNTER — HOSPITAL ENCOUNTER (EMERGENCY)
Age: 53
Discharge: HOME | End: 2023-03-23
Attending: EMERGENCY MEDICINE
Payer: MEDICAID

## 2023-03-20 DIAGNOSIS — F32.A DEPRESSION, UNSPECIFIED DEPRESSION TYPE: ICD-10-CM

## 2023-03-20 DIAGNOSIS — R45.851 SUICIDAL IDEATIONS: Primary | ICD-10-CM

## 2023-03-20 LAB
ALBUMIN SERPL-MCNC: 4.2 G/DL (ref 3.5–5)
ALBUMIN/GLOB SERPL: 1.4 (ref 0.4–1.6)
ALP SERPL-CCNC: 76 U/L (ref 50–136)
ALT SERPL-CCNC: 39 U/L (ref 12–65)
AMPHET UR QL SCN: NEGATIVE
ANION GAP SERPL CALC-SCNC: 3 MMOL/L (ref 2–11)
APAP SERPL-MCNC: <2 UG/ML (ref 10–30)
AST SERPL-CCNC: 22 U/L (ref 15–37)
BASOPHILS # BLD: 0.1 K/UL (ref 0–0.2)
BASOPHILS NFR BLD: 1 % (ref 0–2)
BENZODIAZ UR QL: NEGATIVE
BILIRUB SERPL-MCNC: 0.2 MG/DL (ref 0.2–1.1)
BUN SERPL-MCNC: 6 MG/DL (ref 6–23)
CALCIUM SERPL-MCNC: 9.3 MG/DL (ref 8.3–10.4)
CANNABINOIDS UR QL SCN: POSITIVE
CHLORIDE SERPL-SCNC: 110 MMOL/L (ref 101–110)
CO2 SERPL-SCNC: 26 MMOL/L (ref 21–32)
COCAINE UR QL SCN: NEGATIVE
CREAT SERPL-MCNC: 0.7 MG/DL (ref 0.6–1)
DIFFERENTIAL METHOD BLD: ABNORMAL
EOSINOPHIL # BLD: 0.2 K/UL (ref 0–0.8)
EOSINOPHIL NFR BLD: 1 % (ref 0.5–7.8)
ERYTHROCYTE [DISTWIDTH] IN BLOOD BY AUTOMATED COUNT: 14.4 % (ref 11.9–14.6)
ETHANOL SERPL-MCNC: <3 MG/DL (ref 0–0.08)
GLOBULIN SER CALC-MCNC: 2.9 G/DL (ref 2.8–4.5)
GLUCOSE SERPL-MCNC: 96 MG/DL (ref 65–100)
HCG UR QL: NEGATIVE
HCT VFR BLD AUTO: 39.2 % (ref 35.8–46.3)
HGB BLD-MCNC: 12.8 G/DL (ref 11.7–15.4)
IMM GRANULOCYTES # BLD AUTO: 0.1 K/UL (ref 0–0.5)
IMM GRANULOCYTES NFR BLD AUTO: 1 % (ref 0–5)
LITHIUM SERPL-SCNC: <0.2 MMOL/L (ref 0.6–1.2)
LYMPHOCYTES # BLD: 3.5 K/UL (ref 0.5–4.6)
LYMPHOCYTES NFR BLD: 27 % (ref 13–44)
MCH RBC QN AUTO: 29.6 PG (ref 26.1–32.9)
MCHC RBC AUTO-ENTMCNC: 32.7 G/DL (ref 31.4–35)
MCV RBC AUTO: 90.7 FL (ref 82–102)
MONOCYTES # BLD: 0.9 K/UL (ref 0.1–1.3)
MONOCYTES NFR BLD: 7 % (ref 4–12)
NEUTS SEG # BLD: 8.5 K/UL (ref 1.7–8.2)
NEUTS SEG NFR BLD: 63 % (ref 43–78)
NRBC # BLD: 0 K/UL (ref 0–0.2)
OPIATES UR QL: NEGATIVE
PLATELET # BLD AUTO: 422 K/UL (ref 150–450)
PMV BLD AUTO: 10.1 FL (ref 9.4–12.3)
POTASSIUM SERPL-SCNC: 4.2 MMOL/L (ref 3.5–5.1)
PROT SERPL-MCNC: 7.1 G/DL (ref 6.3–8.2)
RBC # BLD AUTO: 4.32 M/UL (ref 4.05–5.2)
SALICYLATES SERPL-MCNC: 4.6 MG/DL (ref 2.8–20)
SODIUM SERPL-SCNC: 139 MMOL/L (ref 133–143)
WBC # BLD AUTO: 13.3 K/UL (ref 4.3–11.1)

## 2023-03-20 PROCEDURE — 82077 ASSAY SPEC XCP UR&BREATH IA: CPT

## 2023-03-20 PROCEDURE — 80307 DRUG TEST PRSMV CHEM ANLYZR: CPT

## 2023-03-20 PROCEDURE — 80179 DRUG ASSAY SALICYLATE: CPT

## 2023-03-20 PROCEDURE — 80143 DRUG ASSAY ACETAMINOPHEN: CPT

## 2023-03-20 PROCEDURE — 85025 COMPLETE CBC W/AUTO DIFF WBC: CPT

## 2023-03-20 PROCEDURE — 80178 ASSAY OF LITHIUM: CPT

## 2023-03-20 PROCEDURE — 80053 COMPREHEN METABOLIC PANEL: CPT

## 2023-03-20 PROCEDURE — 6370000000 HC RX 637 (ALT 250 FOR IP)

## 2023-03-20 PROCEDURE — 81025 URINE PREGNANCY TEST: CPT

## 2023-03-20 PROCEDURE — 99285 EMERGENCY DEPT VISIT HI MDM: CPT

## 2023-03-20 RX ORDER — NICOTINE 21 MG/24HR
1 PATCH, TRANSDERMAL 24 HOURS TRANSDERMAL
Status: COMPLETED | OUTPATIENT
Start: 2023-03-20 | End: 2023-03-21

## 2023-03-20 RX ORDER — HYDROXYZINE PAMOATE 25 MG/1
50 CAPSULE ORAL
Status: COMPLETED | OUTPATIENT
Start: 2023-03-20 | End: 2023-03-20

## 2023-03-20 RX ORDER — DIVALPROEX SODIUM 250 MG/1
250 TABLET, DELAYED RELEASE ORAL EVERY 8 HOURS SCHEDULED
Status: DISCONTINUED | OUTPATIENT
Start: 2023-03-20 | End: 2023-03-23 | Stop reason: HOSPADM

## 2023-03-20 RX ADMIN — HYDROXYZINE PAMOATE 50 MG: 25 CAPSULE ORAL at 20:09

## 2023-03-20 RX ADMIN — DIVALPROEX SODIUM 250 MG: 250 TABLET, DELAYED RELEASE ORAL at 15:09

## 2023-03-20 RX ADMIN — DIVALPROEX SODIUM 250 MG: 250 TABLET, DELAYED RELEASE ORAL at 21:11

## 2023-03-20 ASSESSMENT — PAIN SCALES - GENERAL
PAINLEVEL_OUTOF10: 0
PAINLEVEL_OUTOF10: 10

## 2023-03-20 ASSESSMENT — PAIN - FUNCTIONAL ASSESSMENT
PAIN_FUNCTIONAL_ASSESSMENT: 0-10
PAIN_FUNCTIONAL_ASSESSMENT: NONE - DENIES PAIN

## 2023-03-20 NOTE — ED TRIAGE NOTES
Patient arrives to ED ambulatory c/o SI- anxiety onset 0700, recently stopped taking depakote and trazadone on Friday and was trying to get into rehab this AM with [facility], but was feeling too anxious to go. States her plan for SI is to hang herself at home because \"the pills didn't work the last two time I tried to kill myself\". Hx of substance abuse meth and mariajuana. Last meth use a week ago, last marijuana use yesterday.  Recent increase in life stressors per pt reports

## 2023-03-20 NOTE — ED NOTES
Patient resting at this time. No signs or symptoms of distress. Respirations even and unlabored. Sitter at bedside. Safety precautions in place.        Skylar Malone RN  03/20/23 1911

## 2023-03-20 NOTE — ED NOTES
Constant Observer Yes - Name: Sailaja Hernández yes   High risk patients are in line of sight at all times Yes   Excess equipment/medical supplies not necessary for the care of the patient removed Yes   All sharp or dangerous objects are removed from room: including but not limited to belts, pens & pencils, needles, medications, cosmetics, lighters, matches, nail files, watches, necklaces, glass objects, razors, razor blades, knives, aerosol sprays, drawstring pants, shoes, cords (telephone, call bells, etc.) cleaning wipes or other cleaning items, aluminum cans, not permanently attached wall décor Yes   Telephone/cell phone removed as well as TV remote (batteries can be swallowed) Yes   Patient belongings removed and labeled at nurses station Yes   Excess linen is removed from room Yes   All plastic bags are removed from the room and replaced with paper trash bags Yes   Patient is in paper scrubs or appropriate gown and using hospital socks with rubber soles Yes   No metal, hard eating utensils or hard plates are on meal tray Yes   Remove all cleaning agents used by [employer] Yes   If Crucifix is hanging on a nail, remove Crucifix as well as the nail Yes       *If any question above is answered \"No,\" documentation is required.         Zacarias Hope RN  03/20/23 2726

## 2023-03-20 NOTE — ED NOTES
Pt resting in bed with even and unlabored respirations. Sitter at bedside.      Jasbir Mcqueen RN  03/20/23 9523

## 2023-03-20 NOTE — ED NOTES
Pt resting in bed with even and unlabored respirations. Sitter at bedside.      Marlen Valdes, RN  03/20/23 6160

## 2023-03-20 NOTE — ED NOTES
Pt resting in bed with even and unlabored respirations. Sitter at bedside.      Jeanne Hoyos RN  03/20/23 3650

## 2023-03-20 NOTE — ED NOTES
Pt resting in bed with even and unlabored respirations. Sitter at bedside.      Mingo Cheung RN  03/20/23 0137

## 2023-03-20 NOTE — ED NOTES
Pt resting in bed with even and unlabored respirations. Sitter at bedside.      Isabel Haile RN  03/20/23 9849

## 2023-03-20 NOTE — ED NOTES
Pt resting in bed with even and unlabored respirations. Sitter at bedside.      Latonia Christiansen RN  03/20/23 2638

## 2023-03-20 NOTE — ED NOTES
Pt resting in bed with even and unlabored respirations. Sitter at bedside.      Rocio Caruso RN  03/20/23 0827

## 2023-03-20 NOTE — ED NOTES
Pt resting in bed with even and unlabored respirations. Sitter at bedside.      Matt Blue RN  03/20/23 7477

## 2023-03-20 NOTE — ED NOTES
Pt resting in bed with even and unlabored respirations. Sitter at bedside.      Teena Woods RN  03/20/23 6607

## 2023-03-21 PROCEDURE — 6370000000 HC RX 637 (ALT 250 FOR IP): Performed by: EMERGENCY MEDICINE

## 2023-03-21 PROCEDURE — 6370000000 HC RX 637 (ALT 250 FOR IP)

## 2023-03-21 RX ORDER — HYDROXYZINE HYDROCHLORIDE 25 MG/1
50 TABLET, FILM COATED ORAL 3 TIMES DAILY PRN
Status: DISCONTINUED | OUTPATIENT
Start: 2023-03-21 | End: 2023-03-23 | Stop reason: HOSPADM

## 2023-03-21 RX ORDER — LANOLIN ALCOHOL/MO/W.PET/CERES
3 CREAM (GRAM) TOPICAL NIGHTLY PRN
Status: DISCONTINUED | OUTPATIENT
Start: 2023-03-21 | End: 2023-03-23 | Stop reason: HOSPADM

## 2023-03-21 RX ADMIN — HYDROXYZINE HYDROCHLORIDE 50 MG: 25 TABLET, FILM COATED ORAL at 13:29

## 2023-03-21 RX ADMIN — DIVALPROEX SODIUM 250 MG: 250 TABLET, DELAYED RELEASE ORAL at 09:00

## 2023-03-21 NOTE — ED NOTES
Patient resting at this time. No signs or symptoms of distress. Respirations even and unlabored. Sitter at bedside. Safety precautions in place.       Georges Irvin RN  03/21/23 0000

## 2023-03-21 NOTE — ED NOTES
Patient resting at this time. No signs or symptoms of distress. Respirations even and unlabored. Sitter at bedside. Safety precautions in place.        Zacarias Hope RN  03/21/23 5904

## 2023-03-21 NOTE — ED NOTES
Patient resting at this time. No signs or symptoms of distress. Respirations even and unlabored. Sitter at bedside. Safety precautions in place.        Leelee Hassan RN  03/21/23 3334

## 2023-03-21 NOTE — ED NOTES
Patient states she thinks someone is trying to kill her mother, easily redirected at this time, continues to be anxious and shaking.        Deidre Fu RN  03/20/23 9534

## 2023-03-21 NOTE — ED NOTES
Patient resting at this time. No signs or symptoms of distress. Respirations even and unlabored. Sitter at bedside. Safety precautions in place.        Justin Shi RN  03/21/23 4238

## 2023-03-21 NOTE — ED NOTES
Patient resting at this time. No signs or symptoms of distress. Respirations even and unlabored. Sitter at bedside. Safety precautions in place.        Skylar Malone RN  03/21/23 0707

## 2023-03-21 NOTE — ED NOTES
Patient resting at this time. No signs or symptoms of distress. Respirations even and unlabored. Sitter at bedside. Safety precautions in place.        Cuong Roy RN  03/20/23 6551

## 2023-03-21 NOTE — ED NOTES
Patient resting at this time. No signs or symptoms of distress. Respirations even and unlabored. Sitter at bedside. Safety precautions in place.        Tatyana Araiza RN  03/20/23 2012

## 2023-03-21 NOTE — ED NOTES
Patient resting at this time. No signs or symptoms of distress. Respirations even and unlabored. Sitter at bedside. Safety precautions in place.        Cuong Roy RN  03/20/23 4795

## 2023-03-21 NOTE — ED NOTES
Patient resting at this time. No signs or symptoms of distress. Respirations even and unlabored. Sitter at bedside. Safety precautions in place.        [facility] TRAVIS Ley  03/20/23 4473

## 2023-03-22 PROCEDURE — 6370000000 HC RX 637 (ALT 250 FOR IP): Performed by: EMERGENCY MEDICINE

## 2023-03-22 PROCEDURE — 6370000000 HC RX 637 (ALT 250 FOR IP)

## 2023-03-22 RX ADMIN — DIVALPROEX SODIUM 250 MG: 250 TABLET, DELAYED RELEASE ORAL at 22:14

## 2023-03-22 RX ADMIN — HYDROXYZINE HYDROCHLORIDE 50 MG: 25 TABLET, FILM COATED ORAL at 19:54

## 2023-03-22 RX ADMIN — Medication 3 MG: at 04:02

## 2023-03-22 RX ADMIN — HYDROXYZINE HYDROCHLORIDE 50 MG: 25 TABLET, FILM COATED ORAL at 04:02

## 2023-03-22 RX ADMIN — Medication 3 MG: at 22:14

## 2023-03-22 RX ADMIN — DIVALPROEX SODIUM 250 MG: 250 TABLET, DELAYED RELEASE ORAL at 08:06

## 2023-03-22 RX ADMIN — DIVALPROEX SODIUM 250 MG: 250 TABLET, DELAYED RELEASE ORAL at 13:58

## 2023-03-22 ASSESSMENT — PAIN - FUNCTIONAL ASSESSMENT: PAIN_FUNCTIONAL_ASSESSMENT: NONE - DENIES PAIN

## 2023-03-22 NOTE — ED NOTES
Report given to [facility]. Care transferred at this time.      Stef Garcia RN  03/22/23 2522

## 2023-03-22 NOTE — ED PROVIDER NOTES
Patient with no acute complaints today. Eating well. Her commitment papers  tomorrow. Case management has favored coming to see patient either today or tomorrow to assist with placement. We will continue her current medications and continue to look for placement. Feliciano Cuellar MD  23 1247      12:50 PM  3/23/23  Patient is in much better spirits today. She states she is not suicidal.  Case management is seeing her and patient has a place to stay when she leaves. She has been given resources which she will reach out to when she is home. She is asking for refills of her medications. Will discharge patient as she is off commitment papers currently.      Feliciano Cuellar MD  23 3580
S: No acute events reported overnight. Patient has been cooperative with staff. O: Patient reports she has been on no consistent medication since August. She was on Trazodone and Depakote as well as Melatonin when she was in [facility]. She states that she was given some medication for \" shaking\" related to her anxiety when she was admitted for [address] [place] [address] in past but she cannot remember the name. She would like to be restarted on this. Vitals:    03/21/23 1130   BP: 125/71   Pulse: 67   Resp: 16   Temp: 98.1 °F (36.7 °C)   SpO2: 95%     Plan: Patient is being followed by CM but it does not seem likely at this time that she will receive inpatient psychiatry placement. She has been restarted on Depakote and this will be continued. Will add Melatonin for sleep. Patient is requesting a medication she had for shaking related to her anxiety, but she cannot recall what this might be. According to her records, she has been on hydroxyzine in past so this will be added. Continue to monitor.       Apolonia Milian MD  03/21/23 1885
clinical opinion that the patient should be held in the ED due to suicidal ideations. Treatment plan per telepsych consult Depakote p.o. 250 mg 3 times daily and advised against benzodiazepine use. Risk of Complications and/or Morbidity of Patient Management:  Patient was admitted I have communication with admitting physician and Discussion with external consultants - telepsych    Is this patient to be included in the SEP-1 core measure due to severe sepsis or septic shock? No Exclusion criteria - the patient is NOT to be included for SEP-1 Core Measure due to: Infection is not suspected     Gloria Pizarro is a 48 y.o. female who presents to the Emergency Department with chief complaint of    Chief Complaint   Patient presents with    Anxiety    Suicidal      44-year-old female presents to the emergency department chief complaint of suicidal ideations. She states that she attempted to check into a rehab facility this morning but that they \"turned her away because they were full. \"  She states that she has been feeling like this for a couple years and she has attempted to hurt herself in the past by overdosing on medications. She denies to have attempted to overdose on any medications today and reports her plan for SI is to hang herself at home because \"the pills didn't work the last two times I tried to kill myself\". She is also reporting audio and visual hallucinations. States that she continues to hear the last name \"sarah\" and that she is seeing a \"blue light bar when I close my eyes a certain way. \"  She is very tearful and shaking throughout the examination. The history is provided by the patient. Review of Systems    Vitals signs and nursing note reviewed:  Patient Vitals for the past 4 hrs:   Temp Pulse Resp BP SpO2   03/20/23 1918 98.6 °F (37 °C) 71 17 136/77 98 %          Physical Exam  Constitutional:       General: She is in acute distress. Appearance: Normal appearance.  She is

## 2023-03-22 NOTE — ED NOTES
Constant Observer Yes - Name: Rocio Parrish Observer Oriented yes   High risk patients are in line of sight at all times Yes   Excess equipment/medical supplies not necessary for the care of the patient removed Yes   All sharp or dangerous objects are removed from room: including but not limited to belts, pens & pencils, needles, medications, cosmetics, lighters, matches, nail files, watches, necklaces, glass objects, razors, razor blades, knives, aerosol sprays, drawstring pants, shoes, cords (telephone, call bells, etc.) cleaning wipes or other cleaning items, aluminum cans, not permanently attached wall décor Yes   Telephone/cell phone removed as well as TV remote (batteries can be swallowed) Yes   Patient belongings removed and labeled at nurses station Yes   Excess linen is removed from room Yes   All plastic bags are removed from the room and replaced with paper trash bags Yes   Patient is in paper scrubs or appropriate gown and using hospital socks with rubber soles Yes   No metal, hard eating utensils or hard plates are on meal tray Yes   Remove all cleaning agents used by [employer] Yes   If Crucifix is hanging on a nail, remove Crucifix as well as the nail Yes       *If any question above is answered \"No,\" documentation is required.       Tee Sarah RN  03/22/23 9699 No

## 2023-03-23 VITALS
WEIGHT: 145.6 LBS | TEMPERATURE: 97.8 F | HEART RATE: 68 BPM | HEIGHT: 66 IN | OXYGEN SATURATION: 100 % | DIASTOLIC BLOOD PRESSURE: 67 MMHG | RESPIRATION RATE: 16 BRPM | BODY MASS INDEX: 23.4 KG/M2 | SYSTOLIC BLOOD PRESSURE: 103 MMHG

## 2023-03-23 PROCEDURE — 6370000000 HC RX 637 (ALT 250 FOR IP)

## 2023-03-23 RX ORDER — TRAZODONE HYDROCHLORIDE 50 MG/1
50 TABLET ORAL NIGHTLY
Qty: 30 TABLET | Refills: 0 | Status: SHIPPED | OUTPATIENT
Start: 2023-03-23

## 2023-03-23 RX ORDER — HYDROXYZINE HYDROCHLORIDE 25 MG/1
25 TABLET, FILM COATED ORAL EVERY 4 HOURS PRN
Qty: 30 TABLET | Refills: 0 | Status: SHIPPED | OUTPATIENT
Start: 2023-03-23

## 2023-03-23 RX ORDER — GABAPENTIN 100 MG/1
200 CAPSULE ORAL 3 TIMES DAILY
Qty: 90 CAPSULE | Refills: 0 | Status: SHIPPED | OUTPATIENT
Start: 2023-03-23 | End: 2023-04-12

## 2023-03-23 RX ADMIN — DIVALPROEX SODIUM 250 MG: 250 TABLET, DELAYED RELEASE ORAL at 05:48

## 2023-03-23 NOTE — ED NOTES
Patient has a visitor who is sitting at bedside. They are conversing and pt is not showing any obvious s&s of distress.       Teresa Mason RN  03/22/23 8855

## 2023-03-23 NOTE — ED NOTES
Pt awake in room. Resp even and unlabored. No needs stated at this time.      Cy Mott, RN  03/23/23 0374

## 2023-03-23 NOTE — ED NOTES
.  Constant Observer NO   Constant Observer Oriented NO   High risk patients are in line of sight at all times yes   Excess equipment/medical supplies not necessary for the care of the patient removed Yes   All sharp or dangerous objects are removed from room: including but not limited to belts, pens & pencils, needles, medications, cosmetics, lighters, matches, nail files, watches, necklaces, glass objects, razors, razor blades, knives, aerosol sprays, drawstring pants, shoes, cords (telephone, call bells, etc.) cleaning wipes or other cleaning items, aluminum cans, not permanently attached wall décor Yes   Telephone/cell phone removed as well as TV remote (batteries can be swallowed) Yes   Patient belongings removed and labeled at nurses station Yes   Excess linen is removed from room Yes   All plastic bags are removed from the room and replaced with paper trash bags Yes   Patient is in paper scrubs or appropriate gown and using hospital socks with rubber soles Yes   No metal, hard eating utensils or hard plates are on meal tray Yes   Remove all cleaning agents used by [employer] Yes   If Crucifix is hanging on a nail, remove Crucifix as well as the nail Yes       *If any question above is answered \"No,\" documentation is required.       Ingrid Felder RN  03/23/23 7303

## 2023-03-23 NOTE — ED NOTES
Pt awake in room. Resp even and unlabored. No needs stated at this time.         Yamilex Pizarro RN  03/23/23 1016

## 2023-03-23 NOTE — CARE COORDINATION
CM met with pt at bedside, pt ambulatory in hallway after shower, pt requests coffee, decaf made and provided to pt. Pt states feeling much better, denies SI/HI at this time and ready for dc, pt made aware staff awaiting 1230 pm MD to arrive for possible dc. Pt has parol officer staff that can assist with placement per pt. Pt also has been provided with contact information for addition resources for outpt follow up. Pt will be assisted with transportation home via round trip if unable to obtain ride with family. 03/23/23 1201   Service Assessment   Patient Orientation Alert and Oriented   Cognition Alert   History Provided By Patient   Primary Caregiver Self   Accompanied By/Relationship none   Support Systems Parent   Prior Functional Level Independent in ADLs/IADLs   Current Functional Level Independent in ADLs/IADLs   Financial Resources Medicaid   CM/SW Referral Psychiatry   Social/Functional History   Lives With Parent   ADL Assistance Independent   Ambulation Assistance Independent   Transfer Assistance Independent   Occupation Unemployed   Discharge Planning   Type of [facility] Prior To Admission None   Potential Assistance Needed Transportation   DME Ordered?  No   Potential Assistance Purchasing Medications No   Type of Home Care Services None   Patient expects to be discharged to: [place] [facility] Discharge   Transition of Care Consult (CM Consult) [address] Discharge Transport
Follow up phone calls made to referral facilities and pt has not been accepted to any inpt psych units at this time. CM staff will remain available to assist with dispo as needed.
Potential Assistance Purchasing Medications No   Type of Home Care Services None   Patient expects to be discharged to: Behavioral Health/Substance/Detox

## 2023-03-23 NOTE — ED NOTES
Patient sleeping in bed. Respirations even and unlabored.       Marcie Ridley, RN  03/23/23 [address] Kunal, RN  03/23/23 5460

## 2023-03-23 NOTE — ED NOTES
Pt awake in room. Resp even and unlabored. No needs stated at this time.         Ingrid Felder, TRAVIS  03/23/23 5941

## 2023-03-23 NOTE — ED NOTES
Pt in bed resting. Resp even and unlabored. No needs stated at this time.       Marrian Apgar, RN  23 9615

## 2023-03-23 NOTE — ED NOTES
I have reviewed discharge instructions with the patient. The patient verbalized understanding. Patient left ED via Discharge Method: ambulatory to Home with self. Opportunity for questions and clarification provided. Patient given 3 scripts. To continue your aftercare when you leave the hospital, you may receive an automated call from our care team to check in on how you are doing. This is a free service and part of our promise to provide the best care and service to meet your aftercare needs.  If you have questions, or wish to unsubscribe from this service please call [phone]. Thank you for Choosing our [facility] Emergency Department.        Len Castellanos RN  03/23/23 8325

## 2023-04-18 NOTE — PROGRESS NOTES
MSN, cM:  Patient continues to require involuntary commitment papers. MD aware. Patient continues to ask this CM about Medicaid which [facility]O confirms she is not eligible, and disability which patient has been told that process will have to be started by her.   Case Management will continue to follow.        [facility]                                                                                                 PATIENT INFORMATION   Patient Name: Lalit Pate [id number] Fritz vd: [de-identified] Patient MRN: [id number]   Address: [address] [place] [address] Patient CSN: [id number]      Restorationism: Holiness   Sex: Female Marital Status:    : 1970 Age:   46 yrs   Home Phone: [phone] 1.00 Mobile Phone:        3.00   Race: WHITE/NON- Employer:   [employer]    Language: ENGLISH Admitted/Arrived From:      ADMISSION INFORMATION   Admit Date: 3/28/2022 Admit Time:  6:49 PM   Patient Class: Inpatient Service: Medicine   Admit Source: Non-health care facility* Admit Type: EMERGENCY   Admitting Provider: Stuart Ortiz Attending Provider: Iris Kennedy   Unit: [facility] 8 Intermediate Room/Bed: 801/    Admission Diagnosis: Intentional lithium overdose [facility]) and codes: 985.8     Emergency Complaint:                 Discharge Date:   Discharge Time:     GUARANTOR INFORMATION   Name:  Natalia Cerna Address: [address] [address]  Rel:  Self   Phone: [phone] : 1970   EMERGENCY CONTACTS   Name: Natasha Siemens:  Mobile:    [phone] Rel: Unknown   COVERAGE INFORMATION   Primary Insurance:   N/A Subscriber:     Plan Name:   Pt Rel to Subscriber:     Claim Address: NA Sex:        Policy #:  N/A    Group #: N/A Group Name:       Auth #: N/A Ins Phone:         Secondary Insurance:   Subscriber:     Plan Name:   Pt Rel to Subscriber:     Claim Address: NA Sex:       Policy #: N/A    Group #: N/A Group Name: N/A   Auth #: What Type Of Note Output Would You Prefer (Optional)?: Standard Output Is This A New Presentation, Or A Follow-Up?: Rash N/A Ins Phone:         Accident Date:    Accident Type:     PROVIDER INFORMATION   PCP:         Evi, Not On File, [facility] PCP Phone:  None   Referring Prov:   No ref.  provider found Referring Phone:  Referring Fax:  N/A      Advanced Directive:  Not Received Research:     Lab Client:   Enrollment Status:              Printed on 4/4/22  2:49 PM Page

## 2025-04-30 NOTE — PROGRESS NOTES
Constant Observer Yes - Name: Mary   Constant Observer Oriented YES   High risk patients are in line of sight at all times Yes   Excess equipment/medical supplies not necessary for the care of the patient removed Yes   All sharp or dangerous objects are removed from room: including but not limited to belts, pens & pencils, needles, medications, cosmetics, lighters, matches, nail files, watches, necklaces, glass objects, razors, razor blades, knives, aerosol sprays, drawstring pants, shoes, cords (telephone, call bells, etc.) cleaning wipes or other cleaning items, aluminum cans, not permanently attached wall décor Yes   Telephone/cell phone removed as well as TV remote (batteries can be swallowed) Yes   Patient belongings removed and labeled at nurses station Yes   Excess linen is removed from room Yes   All plastic bags are removed from the room and replaced with paper trash bags Yes   Patient is in paper scrubs or appropriate gown and using hospital socks with rubber soles Yes   No metal, hard eating utensils or hard plates are on meal tray Yes   Remove all cleaning agents used by [employer] Yes   If Crucifix is hanging on a nail, remove Crucifix as well as the nail Yes       *If any question above is answered \"No,\" documentation is required. Spoke to pt and informed him that referral was put in and a dosage increased. Pt had no further questions.